# Patient Record
Sex: FEMALE | Race: WHITE | NOT HISPANIC OR LATINO | Employment: FULL TIME | URBAN - METROPOLITAN AREA
[De-identification: names, ages, dates, MRNs, and addresses within clinical notes are randomized per-mention and may not be internally consistent; named-entity substitution may affect disease eponyms.]

---

## 2017-01-10 ENCOUNTER — OFFICE VISIT (OUTPATIENT)
Dept: INTERNAL MEDICINE | Facility: CLINIC | Age: 57
End: 2017-01-10
Payer: COMMERCIAL

## 2017-01-10 VITALS
TEMPERATURE: 99 F | DIASTOLIC BLOOD PRESSURE: 76 MMHG | HEIGHT: 66 IN | WEIGHT: 151.69 LBS | SYSTOLIC BLOOD PRESSURE: 118 MMHG | BODY MASS INDEX: 24.38 KG/M2

## 2017-01-10 DIAGNOSIS — R05.9 COUGH: ICD-10-CM

## 2017-01-10 DIAGNOSIS — J02.9 SORETHROAT: Primary | ICD-10-CM

## 2017-01-10 DIAGNOSIS — R06.2 WHEEZE: ICD-10-CM

## 2017-01-10 DIAGNOSIS — R74.8 ABNORMAL LIVER ENZYMES: ICD-10-CM

## 2017-01-10 PROCEDURE — 99213 OFFICE O/P EST LOW 20 MIN: CPT | Mod: S$GLB,,, | Performed by: INTERNAL MEDICINE

## 2017-01-10 PROCEDURE — 87081 CULTURE SCREEN ONLY: CPT

## 2017-01-10 PROCEDURE — 99999 PR PBB SHADOW E&M-EST. PATIENT-LVL III: CPT | Mod: PBBFAC,,, | Performed by: INTERNAL MEDICINE

## 2017-01-10 RX ORDER — ALBUTEROL SULFATE 90 UG/1
2 AEROSOL, METERED RESPIRATORY (INHALATION) EVERY 6 HOURS PRN
Qty: 1 EACH | Refills: 11 | Status: SHIPPED | OUTPATIENT
Start: 2017-01-10 | End: 2017-01-26

## 2017-01-10 RX ORDER — ALBUTEROL SULFATE 90 UG/1
2 AEROSOL, METERED RESPIRATORY (INHALATION) EVERY 6 HOURS PRN
Qty: 1 EACH | Refills: 11 | Status: SHIPPED | OUTPATIENT
Start: 2017-01-10 | End: 2017-01-10 | Stop reason: SDUPTHER

## 2017-01-12 ENCOUNTER — TELEPHONE (OUTPATIENT)
Dept: INTERNAL MEDICINE | Facility: CLINIC | Age: 57
End: 2017-01-12

## 2017-01-12 NOTE — PROGRESS NOTES
Subjective:       Patient ID: Jacek Duran is a 56 y.o. female.    Chief Complaint: Headache and Sore Throat   she started with a sore throat yesterday  She has had congestion and fever  Her temperature in the office is 99.1  She doesn't know what her highest temperature was    She had an ultrasound that showed fatty liver but she had her gallbladder removed and a liver biopsy performed that showed less than 3% fatty liver and on inspection her liver appeared normal.  She does not have any symptoms.  HPI  Review of Systems   Constitutional: Negative.  Negative for activity change, appetite change, chills, fatigue, fever and unexpected weight change.   HENT: Positive for sore throat. Negative for hearing loss and tinnitus.    Eyes: Negative for visual disturbance.   Respiratory: Positive for cough and wheezing. Negative for shortness of breath.    Cardiovascular: Negative.  Negative for chest pain, palpitations and leg swelling.   Gastrointestinal: Negative for abdominal pain, blood in stool, constipation, diarrhea and nausea.   Genitourinary: Negative for dysuria, frequency and urgency.   Musculoskeletal: Negative for back pain and neck stiffness.   Skin: Negative for rash.   Neurological: Negative for headaches.   Psychiatric/Behavioral: Negative for dysphoric mood and sleep disturbance. The patient is not nervous/anxious.        Objective:      Physical Exam   Constitutional: She appears well-nourished.   HENT:   Head: Normocephalic and atraumatic.   Right Ear: External ear normal.   Left Ear: External ear normal.   Nose: Nose normal.   Mouth/Throat: Oropharynx is clear and moist. No oropharyngeal exudate.   Eyes: Conjunctivae are normal. No scleral icterus.   Neck: Neck supple.   Cardiovascular: Normal rate, regular rhythm and normal heart sounds.    Pulmonary/Chest: Effort normal and breath sounds normal. No respiratory distress. She has no wheezes. She has no rales. She exhibits no tenderness.   Abdominal:  Soft. There is no tenderness.   Musculoskeletal: She exhibits no edema.   Lymphadenopathy:     She has no cervical adenopathy.   Neurological: She is alert.   Skin: Skin is warm and dry.   Psychiatric: She has a normal mood and affect. Her behavior is normal.   Nursing note and vitals reviewed.      Assessment:       1. Sorethroat    2. Cough    3. Wheeze    4. Abnormal liver enzymes        Plan:   Jacek was seen today for headache and sore throat.    Diagnoses and all orders for this visit:    Sorethroat  -     Strep A culture, throat    Cough  -     Strep A culture, throat    Wheeze  -     Strep A culture, throat    Abnormal liver enzymes.  She will continue to follow this with her primary care physician.    Other orders  -     Discontinue: albuterol 90 mcg/actuation inhaler; Inhale 2 puffs into the lungs every 6 (six) hours as needed for Wheezing.  -     albuterol 90 mcg/actuation inhaler; Inhale 2 puffs into the lungs every 6 (six) hours as needed for Wheezing.

## 2017-01-12 NOTE — TELEPHONE ENCOUNTER
----- Message from Zain Ackerman sent at 1/12/2017  9:40 AM CST -----  Contact: self/ [phone] cell  Type: Rx    Name of medication(s):  [facility]    Is this a refill? New rx? New    Who prescribed medication?    Pharmacy Name, Phone, & Location: [facility] on [facility]    Comments: Pt states that she now has the green mucus and is still congested.  She would like something sent in today, if possible.  Please call and advise.    Thank you

## 2017-01-13 LAB — BACTERIA THROAT CULT: NORMAL

## 2017-01-26 ENCOUNTER — OFFICE VISIT (OUTPATIENT)
Dept: OPTOMETRY | Facility: CLINIC | Age: 57
End: 2017-01-26
Payer: COMMERCIAL

## 2017-01-26 DIAGNOSIS — H52.4 MYOPIA WITH PRESBYOPIA, BILATERAL: Primary | ICD-10-CM

## 2017-01-26 DIAGNOSIS — Z46.0 FITTING AND ADJUSTMENT OF SPECTACLES AND CONTACT LENSES: Primary | ICD-10-CM

## 2017-01-26 DIAGNOSIS — H52.13 MYOPIA WITH PRESBYOPIA, BILATERAL: Primary | ICD-10-CM

## 2017-01-26 PROCEDURE — 92002 INTRM OPH EXAM NEW PATIENT: CPT | Mod: S$GLB,,, | Performed by: OPTOMETRIST

## 2017-01-26 PROCEDURE — 92015 DETERMINE REFRACTIVE STATE: CPT | Mod: S$GLB,,, | Performed by: OPTOMETRIST

## 2017-01-26 PROCEDURE — 99999 PR PBB SHADOW E&M-EST. PATIENT-LVL II: CPT | Mod: PBBFAC,,, | Performed by: OPTOMETRIST

## 2017-01-26 PROCEDURE — 92310 CONTACT LENS FITTING OU: CPT | Mod: S$GLB,,, | Performed by: OPTOMETRIST

## 2017-01-26 NOTE — PROGRESS NOTES
HPI     RONALDO: 1 year ago  Pt states harder to read with left eye. Pt only wear CL in the right eye   for distance. Pt states denies f/f    No gtts        Last edited by Sara Resendiz on 1/26/2017  2:20 PM.     ROS     Positive for: Eyes    Negative for: Constitutional, Gastrointestinal, Neurological, Skin,   Genitourinary, Musculoskeletal, HENT, Endocrine, Cardiovascular,   Respiratory, Psychiatric, Allergic/Imm, Heme/Lymph    Last edited by Moises Pa, OD on 1/26/2017  2:24 PM. (History)        Assessment /Plan     For exam results, see Encounter Report.    Myopia with presbyopia, bilateral      1. EWSCL overwear OD w mild K edema.  Discussed in detail risks of EW!!!  2. Pt wearing one CL OD for dist, no CL OS for near.  Is -1.75  OD (-1.50 in CL), -1.00 OS.  Discussed w pt could switch and make OS wear CL for dist, and no CL OD for near--since OD more myopic would see better up close  3. Pt wearing AV2.  Tried N+D/OPTIX/OASYS in past without success.  Wishes to try ladan disp.  Good fit w AV ONE DAY MOIST  4. Pt refused DFE today    PLAN:    1. DISP trials ONE DAY AV MOIST--5 trials for each eye.  Pt will experiment whether she prefers wearing one CL OD or OS for dist  2. NO SLEEPING IN CLs!! Pt to exchange nightly  3. Pt will call for CLRx when determines which eye she prefers her CL in  4. rtc 1 yr

## 2017-01-26 NOTE — LETTER
January 27, 2017      Barbara Paz MD  [address] [facility] [address]           [place] - [facility]  [address] [facility] [address]  [place] [address]  Phone: [phone]  Fax: [phone]          Patient: Jacek Duran   MR Number: [id number]   YOB: 1960   Date of Visit: 1/26/2017       Dear Dr. Barbara Paz:    Thank you for referring Jacek Duran to me for evaluation. Attached you will find relevant portions of my assessment and plan of care.    If you have questions, please do not hesitate to call me. I look forward to following Jacek Duran along with you.    Sincerely,    Moises Pa, OD    Enclosure  CC:  No Recipients    If you would like to receive this communication electronically, please contact [email or web address] or [phone] to request more information on [facility] access.    For providers and/or their staff who would like to refer a patient to [facility], please contact us through our one-stop-shop provider referral line, [facility] , at [phone].    If you feel you have received this communication in error or would no longer like to receive these types of communications, please e-mail [email or web address]

## 2017-02-24 ENCOUNTER — TELEPHONE (OUTPATIENT)
Dept: OTOLARYNGOLOGY | Facility: CLINIC | Age: 57
End: 2017-02-24

## 2017-02-24 NOTE — TELEPHONE ENCOUNTER
Returned call. No answer. Left message along with contact information will have Roxanne CORMIER return call next week.

## 2017-02-27 ENCOUNTER — PATIENT MESSAGE (OUTPATIENT)
Dept: OTOLARYNGOLOGY | Facility: CLINIC | Age: 57
End: 2017-02-27

## 2017-03-13 ENCOUNTER — PATIENT MESSAGE (OUTPATIENT)
Dept: INTERNAL MEDICINE | Facility: CLINIC | Age: 57
End: 2017-03-13

## 2017-03-13 DIAGNOSIS — Z00.00 ROUTINE GENERAL MEDICAL EXAMINATION AT A HEALTH CARE FACILITY: Primary | ICD-10-CM

## 2017-03-14 ENCOUNTER — LAB VISIT (OUTPATIENT)
Dept: LAB | Facility: HOSPITAL | Age: 57
End: 2017-03-14
Attending: INTERNAL MEDICINE
Payer: COMMERCIAL

## 2017-03-14 DIAGNOSIS — Z00.00 ROUTINE GENERAL MEDICAL EXAMINATION AT A HEALTH CARE FACILITY: ICD-10-CM

## 2017-03-14 LAB
ALBUMIN SERPL BCP-MCNC: 3.9 G/DL
ALP SERPL-CCNC: 99 U/L
ALT SERPL W/O P-5'-P-CCNC: 29 U/L
ANION GAP SERPL CALC-SCNC: 11 MMOL/L
AST SERPL-CCNC: 29 U/L
BASOPHILS # BLD AUTO: 0.01 K/UL
BASOPHILS NFR BLD: 0.2 %
BILIRUB SERPL-MCNC: 0.4 MG/DL
BUN SERPL-MCNC: 14 MG/DL
CALCIUM SERPL-MCNC: 9.2 MG/DL
CHLORIDE SERPL-SCNC: 104 MMOL/L
CO2 SERPL-SCNC: 24 MMOL/L
CREAT SERPL-MCNC: 0.8 MG/DL
DIFFERENTIAL METHOD: NORMAL
EOSINOPHIL # BLD AUTO: 0.2 K/UL
EOSINOPHIL NFR BLD: 2.6 %
ERYTHROCYTE [DISTWIDTH] IN BLOOD BY AUTOMATED COUNT: 12.2 %
EST. GFR  (AFRICAN AMERICAN): >60 ML/MIN/1.73 M^2
EST. GFR  (NON AFRICAN AMERICAN): >60 ML/MIN/1.73 M^2
ESTIMATED AVG GLUCOSE: 105 MG/DL
GLUCOSE SERPL-MCNC: 116 MG/DL
HBA1C MFR BLD HPLC: 5.3 %
HCT VFR BLD AUTO: 37.2 %
HGB BLD-MCNC: 12.1 G/DL
LYMPHOCYTES # BLD AUTO: 2.3 K/UL
LYMPHOCYTES NFR BLD: 36.3 %
MCH RBC QN AUTO: 28.7 PG
MCHC RBC AUTO-ENTMCNC: 32.5 %
MCV RBC AUTO: 88 FL
MONOCYTES # BLD AUTO: 0.4 K/UL
MONOCYTES NFR BLD: 6.8 %
NEUTROPHILS # BLD AUTO: 3.3 K/UL
NEUTROPHILS NFR BLD: 53.9 %
PLATELET # BLD AUTO: 246 K/UL
PMV BLD AUTO: 10.7 FL
POTASSIUM SERPL-SCNC: 3.6 MMOL/L
PROT SERPL-MCNC: 7.7 G/DL
RBC # BLD AUTO: 4.21 M/UL
SODIUM SERPL-SCNC: 139 MMOL/L
TSH SERPL DL<=0.005 MIU/L-ACNC: 1.15 UIU/ML
WBC # BLD AUTO: 6.19 K/UL

## 2017-03-14 PROCEDURE — 36415 COLL VENOUS BLD VENIPUNCTURE: CPT

## 2017-03-14 PROCEDURE — 84443 ASSAY THYROID STIM HORMONE: CPT

## 2017-03-14 PROCEDURE — 85025 COMPLETE CBC W/AUTO DIFF WBC: CPT

## 2017-03-14 PROCEDURE — 80053 COMPREHEN METABOLIC PANEL: CPT

## 2017-03-14 PROCEDURE — 83036 HEMOGLOBIN GLYCOSYLATED A1C: CPT

## 2017-03-16 ENCOUNTER — OFFICE VISIT (OUTPATIENT)
Dept: INTERNAL MEDICINE | Facility: CLINIC | Age: 57
End: 2017-03-16
Payer: COMMERCIAL

## 2017-03-16 VITALS
HEART RATE: 73 BPM | SYSTOLIC BLOOD PRESSURE: 104 MMHG | HEIGHT: 66 IN | TEMPERATURE: 98 F | DIASTOLIC BLOOD PRESSURE: 76 MMHG | WEIGHT: 150.38 LBS | BODY MASS INDEX: 24.17 KG/M2 | RESPIRATION RATE: 16 BRPM

## 2017-03-16 DIAGNOSIS — Z00.00 ANNUAL PHYSICAL EXAM: Primary | ICD-10-CM

## 2017-03-16 DIAGNOSIS — E55.9 VITAMIN D DEFICIENCY: ICD-10-CM

## 2017-03-16 DIAGNOSIS — Z12.31 VISIT FOR SCREENING MAMMOGRAM: ICD-10-CM

## 2017-03-16 PROCEDURE — 99999 PR PBB SHADOW E&M-EST. PATIENT-LVL III: CPT | Mod: PBBFAC,,, | Performed by: INTERNAL MEDICINE

## 2017-03-16 PROCEDURE — 99396 PREV VISIT EST AGE 40-64: CPT | Mod: S$GLB,,, | Performed by: INTERNAL MEDICINE

## 2017-03-16 NOTE — PROGRESS NOTES
Subjective:       Patient ID: Jacek Duran is a 56 y.o. female.    Chief Complaint: Annual Exam    HPI     56 y.o. female here for annual exam.     Cholesterol: needs  Vaccines: Influenza - UTD; Tetanus - 2008  Sexual Screening: not active  STD screening: not active  Eye exam: done last recently; December  Mammogram: due  Gyn exam: done last year.  Colonoscopy: due   A1c: 5.3    Exercise:  Goes to the gym and uses the treadmill.  Chases 2.4 yo.  Runs around work all day long.  Diet: ok diet.  No fast food, but eats out a lot and cooks at home.  Drinks water, coffee.  No juice, soft drinks, rarely tea.  Some milk.    Past Medical History:   Diagnosis Date    Abnormal Pap smear     before , none since    Fatty liver     Hyperlipidemia     Mild vitamin D deficiency      Past Surgical History:   Procedure Laterality Date    breast augmentation       SECTION, LOW TRANSVERSE      CHOLECYSTECTOMY       Social History     Social History    Marital status: Single     Spouse name: N/A    Number of children: 2    Years of education: N/A     Occupational History    [employer] [employer]     Social History Main Topics    Smoking status: Never Smoker    Smokeless tobacco: Never Used    Alcohol use Yes      Comment: twice a month    Drug use: No    Sexual activity: Not Currently     Other Topics Concern    Not on file     Social History Narrative    She is exercising on and off.     Review of patient's allergies indicates:  No Known Allergies  Ms. Duran had no medications administered during this visit.    Review of Systems   Constitutional: Negative for chills, fever and unexpected weight change.   HENT: Negative for congestion, postnasal drip and sore throat.    Eyes: Negative for redness and visual disturbance.   Respiratory: Negative for cough and shortness of breath.    Cardiovascular: Negative for chest pain and palpitations.   Gastrointestinal: Negative for abdominal pain,  constipation, diarrhea, nausea and vomiting.   Genitourinary: Negative for dysuria, frequency and hematuria.   Musculoskeletal: Negative for arthralgias and myalgias.   Skin: Negative for color change and rash.   Neurological: Negative for dizziness and headaches.       Objective:      Physical Exam   Constitutional: She is oriented to person, place, and time. She appears well-developed and well-nourished.   HENT:   Head: Normocephalic and atraumatic.   Mouth/Throat: No oropharyngeal exudate.   Eyes: EOM are normal. Pupils are equal, round, and reactive to light. Right eye exhibits no discharge. Left eye exhibits no discharge. No scleral icterus.   Neck: Normal range of motion. Neck supple. No tracheal deviation present. No thyromegaly present.   Cardiovascular: Normal rate, regular rhythm and normal heart sounds.  Exam reveals no gallop and no friction rub.    No murmur heard.  Pulmonary/Chest: Effort normal and breath sounds normal. No respiratory distress. She has no wheezes. She has no rales. She exhibits no tenderness.   Abdominal: Soft. Bowel sounds are normal. She exhibits no distension and no mass. There is no tenderness. There is no rebound and no guarding.   Musculoskeletal: Normal range of motion. She exhibits no edema or tenderness.   Neurological: She is alert and oriented to person, place, and time.   Skin: Skin is warm and dry. No rash noted. No erythema. No pallor.   Psychiatric: She has a normal mood and affect. Her behavior is normal.   Vitals reviewed.      Assessment:       1. Annual physical exam    2. Vitamin D deficiency    3. Visit for screening mammogram        Plan:       1.  Reviewed blood work with patient.  Still need to get lipid panel.  Check vitamin D as well.  Discussed exercise with patient.  Up-to-date colonoscopy and GYN screening.  2.  Vitamin D.  3.  Mammogram.

## 2017-05-05 ENCOUNTER — TELEPHONE (OUTPATIENT)
Dept: INTERNAL MEDICINE | Facility: CLINIC | Age: 57
End: 2017-05-05

## 2017-06-01 ENCOUNTER — TELEPHONE (OUTPATIENT)
Dept: INTERNAL MEDICINE | Facility: CLINIC | Age: 57
End: 2017-06-01

## 2017-06-01 ENCOUNTER — PATIENT MESSAGE (OUTPATIENT)
Dept: INTERNAL MEDICINE | Facility: CLINIC | Age: 57
End: 2017-06-01

## 2017-06-01 ENCOUNTER — HOSPITAL ENCOUNTER (OUTPATIENT)
Dept: RADIOLOGY | Facility: HOSPITAL | Age: 57
Discharge: HOME | End: 2017-06-01
Attending: OBSTETRICS & GYNECOLOGY
Payer: COMMERCIAL

## 2017-06-01 DIAGNOSIS — Z12.31 VISIT FOR SCREENING MAMMOGRAM: ICD-10-CM

## 2017-06-01 PROCEDURE — 77067 SCR MAMMO BI INCL CAD: CPT | Mod: 26,,, | Performed by: RADIOLOGY

## 2017-06-01 PROCEDURE — 77067 SCR MAMMO BI INCL CAD: CPT | Mod: TC

## 2017-06-01 PROCEDURE — 77063 BREAST TOMOSYNTHESIS BI: CPT | Mod: 26,,, | Performed by: RADIOLOGY

## 2017-06-02 ENCOUNTER — LAB VISIT (OUTPATIENT)
Dept: LAB | Facility: HOSPITAL | Age: 57
End: 2017-06-02
Attending: INTERNAL MEDICINE
Payer: COMMERCIAL

## 2017-06-02 ENCOUNTER — TELEPHONE (OUTPATIENT)
Dept: INTERNAL MEDICINE | Facility: CLINIC | Age: 57
End: 2017-06-02

## 2017-06-02 ENCOUNTER — PATIENT MESSAGE (OUTPATIENT)
Dept: OBSTETRICS AND GYNECOLOGY | Facility: CLINIC | Age: 57
End: 2017-06-02

## 2017-06-02 DIAGNOSIS — E55.9 VITAMIN D DEFICIENCY: ICD-10-CM

## 2017-06-02 DIAGNOSIS — Z00.00 ROUTINE GENERAL MEDICAL EXAMINATION AT A HEALTH CARE FACILITY: ICD-10-CM

## 2017-06-02 DIAGNOSIS — Z11.59 NEED FOR HEPATITIS C SCREENING TEST: ICD-10-CM

## 2017-06-02 LAB
25(OH)D3+25(OH)D2 SERPL-MCNC: 22 NG/ML
CHOLEST/HDLC SERPL: 3 {RATIO}
HCV AB SERPL QL IA: NEGATIVE
HDL/CHOLESTEROL RATIO: 33.6 %
HDLC SERPL-MCNC: 226 MG/DL
HDLC SERPL-MCNC: 76 MG/DL
LDLC SERPL CALC-MCNC: 137.4 MG/DL
NONHDLC SERPL-MCNC: 150 MG/DL
TRIGL SERPL-MCNC: 63 MG/DL

## 2017-06-02 PROCEDURE — 86803 HEPATITIS C AB TEST: CPT

## 2017-06-02 PROCEDURE — 80061 LIPID PANEL: CPT

## 2017-06-02 PROCEDURE — 36415 COLL VENOUS BLD VENIPUNCTURE: CPT

## 2017-06-02 PROCEDURE — 82306 VITAMIN D 25 HYDROXY: CPT

## 2017-06-02 RX ORDER — ERGOCALCIFEROL 1.25 MG/1
50000 CAPSULE ORAL WEEKLY
Qty: 12 CAPSULE | Refills: 0 | Status: SHIPPED | OUTPATIENT
Start: 2017-06-02 | End: 2017-08-19

## 2017-08-21 ENCOUNTER — PATIENT MESSAGE (OUTPATIENT)
Dept: INTERNAL MEDICINE | Facility: CLINIC | Age: 57
End: 2017-08-21

## 2017-08-22 ENCOUNTER — TELEPHONE (OUTPATIENT)
Dept: INTERNAL MEDICINE | Facility: CLINIC | Age: 57
End: 2017-08-22

## 2017-08-22 NOTE — TELEPHONE ENCOUNTER
"----- Message from Onel Aleman sent at 8/22/2017  7:20 AM CDT -----  Contact: patient at [phone]  Patient scheduled [facility] appointment on 8/24 for "Heart flutter at times of rest".   Is this okay or does patient need to speak with you first regarding these symptoms?  "

## 2017-08-24 ENCOUNTER — CLINICAL SUPPORT (OUTPATIENT)
Dept: CARDIOLOGY | Facility: CLINIC | Age: 57
End: 2017-08-24
Payer: COMMERCIAL

## 2017-08-24 ENCOUNTER — OFFICE VISIT (OUTPATIENT)
Dept: INTERNAL MEDICINE | Facility: CLINIC | Age: 57
End: 2017-08-24
Payer: COMMERCIAL

## 2017-08-24 VITALS
HEART RATE: 72 BPM | BODY MASS INDEX: 24.2 KG/M2 | WEIGHT: 150.56 LBS | SYSTOLIC BLOOD PRESSURE: 102 MMHG | TEMPERATURE: 98 F | DIASTOLIC BLOOD PRESSURE: 68 MMHG | HEIGHT: 66 IN

## 2017-08-24 DIAGNOSIS — R00.2 PALPITATIONS: ICD-10-CM

## 2017-08-24 DIAGNOSIS — R00.2 PALPITATIONS: Primary | ICD-10-CM

## 2017-08-24 PROCEDURE — 99213 OFFICE O/P EST LOW 20 MIN: CPT | Mod: S$GLB,,, | Performed by: INTERNAL MEDICINE

## 2017-08-24 PROCEDURE — 93224 XTRNL ECG REC UP TO 48 HRS: CPT | Mod: S$GLB,,, | Performed by: INTERNAL MEDICINE

## 2017-08-24 PROCEDURE — 93010 ELECTROCARDIOGRAM REPORT: CPT | Mod: S$GLB,,, | Performed by: INTERNAL MEDICINE

## 2017-08-24 PROCEDURE — 3008F BODY MASS INDEX DOCD: CPT | Mod: S$GLB,,, | Performed by: INTERNAL MEDICINE

## 2017-08-24 PROCEDURE — 99999 PR PBB SHADOW E&M-EST. PATIENT-LVL III: CPT | Mod: PBBFAC,,, | Performed by: INTERNAL MEDICINE

## 2017-08-24 PROCEDURE — 93005 ELECTROCARDIOGRAM TRACING: CPT | Mod: S$GLB,,, | Performed by: INTERNAL MEDICINE

## 2017-08-24 NOTE — PROGRESS NOTES
Verified pt name and .  Pt signed consent stated that the monitor will be returned to 8th floor Cardiology at the [facility].  Pt prepped and EKG leads placed.  Pt to be on holter monitor for 48 hours.  Instructions, diary and extra EKG pads provided.

## 2017-08-24 NOTE — PROGRESS NOTES
Subjective:       Patient ID: Jacek Duran is a 57 y.o. female.    Chief Complaint: Palpitations    HPI     57-year-old female here for evaluation of palpitations.  She can lie sideways and feels her heart fluttering.  This lasts for 30 seconds maybe.  For a while, it happened multiple times a month.  Now, she has started dieting has has not noticed this yet.  She has anxiety/caffiene with racing heart, which is different.  These are different than the ones she had two years ago.    Review of Systems   Constitutional: Negative for activity change and unexpected weight change.   HENT: Negative for hearing loss, rhinorrhea and trouble swallowing.    Eyes: Negative for discharge and visual disturbance.   Respiratory: Negative for chest tightness and wheezing.    Cardiovascular: Positive for palpitations. Negative for chest pain.   Gastrointestinal: Negative for blood in stool, constipation, diarrhea and vomiting.   Endocrine: Negative for polydipsia and polyuria.   Genitourinary: Negative for difficulty urinating, dysuria, hematuria and menstrual problem.   Musculoskeletal: Negative for arthralgias, joint swelling and neck pain.   Neurological: Negative for weakness and headaches.   Psychiatric/Behavioral: Negative for confusion and dysphoric mood.       Objective:      Physical Exam   Constitutional: She is oriented to person, place, and time. She appears well-developed and well-nourished.   HENT:   Head: Normocephalic and atraumatic.   Mouth/Throat: No oropharyngeal exudate.   Eyes: EOM are normal. Pupils are equal, round, and reactive to light. Right eye exhibits no discharge. Left eye exhibits no discharge. No scleral icterus.   Neck: Normal range of motion. Neck supple. No tracheal deviation present. No thyromegaly present.   Cardiovascular: Normal rate, regular rhythm and normal heart sounds.  Exam reveals no gallop and no friction rub.    No murmur heard.  Pulmonary/Chest: Effort normal and breath sounds  normal. No respiratory distress. She has no wheezes. She has no rales. She exhibits no tenderness.   Abdominal: Soft. Bowel sounds are normal. She exhibits no distension and no mass. There is no tenderness. There is no rebound and no guarding.   Musculoskeletal: Normal range of motion. She exhibits no edema or tenderness.   Neurological: She is alert and oriented to person, place, and time.   Skin: Skin is warm and dry. No rash noted. No erythema. No pallor.   Psychiatric: She has a normal mood and affect. Her behavior is normal.   Vitals reviewed.      Assessment:       1. Palpitations        Plan:       1.  Check EKG and 48 hour Holter monitor.  May need to have 30 day event monitor.

## 2017-09-26 ENCOUNTER — CLINICAL SUPPORT (OUTPATIENT)
Dept: REHABILITATION | Facility: OTHER | Age: 57
End: 2017-09-26
Attending: PHYSICAL MEDICINE & REHABILITATION
Payer: COMMERCIAL

## 2017-09-26 VITALS
HEART RATE: 66 BPM | HEIGHT: 66 IN | BODY MASS INDEX: 23.3 KG/M2 | WEIGHT: 145 LBS | DIASTOLIC BLOOD PRESSURE: 60 MMHG | SYSTOLIC BLOOD PRESSURE: 108 MMHG

## 2017-09-26 DIAGNOSIS — M54.2 NECK PAIN: ICD-10-CM

## 2017-09-26 DIAGNOSIS — M50.30 DDD (DEGENERATIVE DISC DISEASE), CERVICAL: Primary | ICD-10-CM

## 2017-09-26 PROCEDURE — 97750 PHYSICAL PERFORMANCE TEST: CPT | Mod: 32 | Performed by: PHYSICAL MEDICINE & REHABILITATION

## 2017-09-26 NOTE — PROGRESS NOTES
Health  met with patient to complete initial outcomes for the Healthy Back cervical program.  Questions were reviewed with patient and discussed with Dr. Kirby. The patient will meet with Dr. Kirby to determine program enrollment.   HealthyBack Questionnaire  9/26/2017   Please select the location of your worst pain:  Neck   When did this pain begin?  about 9 months ago   Do you need any help looking after yourself? I need no help at all.   When doing household tasks, e.g., preparing food, gardening, using the video recorder, radio, telephone, or washing the car: Occasionally I need some help with household tasks.   Thinking about how easily you can get around your home and community: I get around my home and community by myself without any difficulty.   Because of your health, your relationships, e.g., your friends, partner or parents, generally: Are very close and warm   Thinking about your relationships with other people: I have plenty of friends and am never lonely.   Thinking about your health and your relationship with your  family:  There are some parts of my family role I cannot carry out.   Thinking about your vision, including when using your glasses or contact lenses if needed: I have some difficulty focusing on things, or I do not see them sharply, e.g., small print, a newspaper or objects in the distance.   Thinking about your hearing, including using your hearing aid if needed: I hear normally.   When you communicate with others, e.g., talking, listening, writing, or signing: I have no trouble speaking to them or understanding what they are saying.   Thinking about how you sleep: My sleep is interrupted some of the time, but I am usually able to go back to sleep without difficulty.   Thinking about how you generally feel: I am slightly anxious, worried or depressed.   How much pain or discomfort do you experience? I have moderate pain.   Little interest or pleasure in doing things Not at all    Feeling down, depressed or hopeless Not at all   Please select the location of any additional pain: (hold down the control key, and click to select multiple responses) Arm - Left   Did any event trigger your pain?  No   How long has this pain been going on?  about 9 months   Please indicate how the pain is changing.  Improving   What is the WORST level of pain that you have experienced in the last week?  7   What is the LEAST level of pain that you have experienced in the past week?  0   What can you NOT do not that you used to be able to do?  NA   Are your limitations mainly due to your pain?  No   What are your additional complaints, if any? (hold down the control key, and click to select multiple responses) Numbness   Is there ever a time during the day when your pain stops, even for a brief moment, before returning? Yes   If yes, when your pain stops, does it disappear completely? Is it totally gone? Yes   Does looking down make your typical pain worse? No   Morning: Worse during   Afternoon: Better during   Evening:  Worse during   Nighttime: Worse during   Standing:  Same   Lying on stomach: Worse   Lying on back: Same   Sitting:  Same   Walking: Same   Using a pillow: Better   Have all of your attempts to treat your neck/arm pain resulted in failure?  No   Do you believe your doctor(s) do NOT understand how much pain you have?  No   Do you believe that you have one or more conditions that have not been diagnosed by your doctor(s)?  No   Do you believe that you deserve more understanding from others including your family than you are getting?  No   Do you feel relatively calm about how your neck/arm pain has impacted your life?  Yes   Are you OK with treatment taking a very long time (even years) before you feel relief from your neck/arm pain?  Yes   Do you believe that your pain has caused you to be more forgetful?  No   Do you feel that you have not received enough treatment for your pain?  No   Do you  believe that your doctor(s) do not have the right training to treat your neck/arm pain effectively?  No   Do you believe you should not be allowed to work or attend school because of your neck/arm pain?  No   When did this pain begin?  about 9 months ago   Did the pain begin after an injury or an accident? No   Is the pain work related?  Yes   Please joie which of the following over-the-counter medicines you take. (hold down the control key, and click to select multiple responses) Ibuprofen   Please joie which of the following prescription medicines you take. (hold down the control key, and click to select multiple responses) Other   Emergency department  No   Health care providers (hold down the control key, and click to select multiple responses) Orthopedic   Investigations done (hold down the control key, and click to select multiple responses) X-ray   Procedures (hold down the control key, and click to select multiple responses) None   Emergency department  No   Health care providers (hold down the control key, and click to select multiple responses) None   Investigations done (hold down the control key, and click to select multiple responses) None   Procedures (hold down the control key, and click to select multiple responses) None   Have you had any surgery on your neck? No   Please joie what you are experiencing. (hold down the control key, and click to select multiple responses) None   First activity you would like to perform better:  be able to strengthening  neck    Current ability score to perform first activity: 4   Second activity you would like to perform better: being active at work    Current ability score to perform second activity: 5   Third activity you would like to perform better: laying on stomach    Current ability score to perform third activity: 2   Pain intensity:  I have no pain at the moment.   Personal care (washing, dressing, etc.): I can look after myself without causing extra pain.    Lifting: Pain prevents me from lifting heavy weights off the floor, but I can manage if conveniently positioned, e.g., a table.   Reading: I can read as much as I want with slight pain in my neck.   Headaches: I have no headaches at all.   Concentration: I can concentrate fully when I want to with slight difficulty.   Working: I can only do my usual work but no more.   Driving: I can drive my car as long as I want with slight pain in my neck.   Sleeping: My sleep is moderately disturbed (2-3 hours sleepless).   Recreation: I am able to engage in all of my recreation activities, with some pain in my neck.   Do you need any help looking after yourself? I need no help at all.   When doing household tasks, e.g., preparing food, gardening, using the video recorder, radio, telephone, or washing the car: Occasionally I need some help with household tasks.   Thinking about how easily you can get around your home and community: I get around my home and community by myself without any difficulty.   Because of your health, your relationships, e.g., your friends, partner or parents, generally: Are very close and warm   Thinking about your relationships with other people: I have plenty of friends and am never lonely.   Thinking about your health and your relationship with your  family:  There are some parts of my family role I cannot carry out.   Thinking about your vision, including when using your glasses or contact lenses if needed: I have some difficulty focusing on things, or I do not see them sharply, e.g., small print, a newspaper or objects in the distance.   Thinking about your hearing, including using your hearing aid if needed: I hear normally.   When you communicate with others, e.g., talking, listening, writing, or signing: I have no trouble speaking to them or understanding what they are saying.   Thinking about how you sleep: My sleep is interrupted some of the time, but I am usually able to go back to sleep  without difficulty.   Thinking about how you generally feel: I am slightly anxious, worried or depressed.   How much pain or discomfort do you experience? I have moderate pain.   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 What are your hobbies? shopping    What is your highest level of education? College   What is your work status? Employed   How did you hear about the Healthyback program?  Patient referral

## 2017-09-26 NOTE — PROGRESS NOTES
Subjective:      Patient ID: Jacek Duran is a 57 y.o. female.    Chief Complaint: No chief complaint on file.    Referred by: No ref. provider found     Ms Duran is a 58 yo female here  for evaluation for the healthy back cervical program.  she has had neck pain for for over a year, 2016.  The pain reslved.  She will have some achy pain, but mainly want to be preventative.  The pain is neck dominant, she had one episode of left arm pain.  The pain is intermittent, ranging between 0-3/10.  The pain is an achy pain with occasional shooting.   It is worse with lying on her stomach and lying on her back, looking down for too long, and looking to the side.  She is stiff in the morning.  She feels better with a pillow.  She has never been to PT, or chiropractor.  She had one massage without relief.  She exercises without complaint of neck pain.  Her goals are to be able to push gurneys, lie on her stomach, and strengthen her neck.      MR! cervical 2016  Findings: No marrow replacement marrow edema infection or neoplasm seen.  There is minimal DJD.  The cord is normal in course, caliber, and signal and the craniocervical junction is normal..  No soft tissue injury or whiplash injury seen.    C2 -- C3 is unremarkable.    C3 -- C4 is unremarkable.    C4 -- C5 is unremarkable.    C5 -- C6 demonstrates a broad-based disk protrusion that lateralizes towards the left flattens the left anterior lateral thecal sac.  Neural foramina are patent.    C6 -- C7 and C7 -- T1 are unremarkable.  Impression      Disk protrusion C5 -- C6.      Past Medical History:  No date: Abnormal Pap smear      Comment: before , none since  No date: Fatty liver  No date: Hyperlipidemia  No date: Mild vitamin D deficiency    Past Surgical History:  No date: breast augmentation  No date:  SECTION, LOW TRANSVERSE  No date: CHOLECYSTECTOMY    Review of patient's family history indicates:    Kidney disease                 Mother                       Comment: brought on by medication    Diabetes                       Mother                    Cataracts                      Mother                    Hypertension                   Mother                    Hyperlipidemia                 Mother                    Heart disease                  Father                      Comment: CABG x 3 in 70s; pacemaker placed at 96    Cataracts                      Father                    Colon cancer                   Paternal Uncle              Comment: in 70s-80s    Heart disease                  Paternal Uncle              Comment: CABG in 60s-70s - all uncles.    Stroke                         Maternal Grandmother      Amblyopia                      Neg Hx                    Blindness                      Neg Hx                    Glaucoma                       Neg Hx                    Macular degeneration           Neg Hx                    Retinal detachment             Neg Hx                    Strabismus                     Neg Hx                    Thyroid disease                Neg Hx                    Breast cancer                  Neg Hx                    Ovarian cancer                 Neg Hx                    Cancer                         Neg Hx                    Melanoma                       Neg Hx                      Social History    Marital status: Single              Spouse name:                       Years of education:                 Number of children: 2             Occupational History  Occupation          Employer            Comment               [employer]     Social History Main Topics    Smoking status: Never Smoker                                                                Smokeless tobacco: Never Used                        Alcohol use: Yes                Comment: twice a month    Drug use: No              Sexual activity: Not Currently        Social History Narrative    She is exercising on  and off.        Current Outpatient Prescriptions:  acyclovir (ZOVIRAX) 400 MG tablet, Take 1 tablet (400 mg total) by mouth 3 (three) times daily. (Patient taking differently: Take 400 mg by mouth 3 (three) times daily as needed. ), Disp: 21 tablet, Rfl: 0    No current facility-administered medications for this visit.       Review of patient's allergies indicates:  No Known Allergies                Review of Systems   Constitution: Negative for weight gain and weight loss.   Cardiovascular: Negative for chest pain.   Respiratory: Negative for shortness of breath.    Musculoskeletal: Positive for back pain. Negative for joint pain and joint swelling.   Gastrointestinal: Negative for abdominal pain and bowel incontinence.   Genitourinary: Negative for bladder incontinence.   Neurological: Positive for paresthesias (come and go in pinky). Negative for numbness.           Objective:          General    Vitals reviewed.  Constitutional: She is oriented to person, place, and time. She appears well-developed and well-nourished.   HENT:   Head: Normocephalic and atraumatic.   Pulmonary/Chest: Effort normal.   Neurological: She is alert and oriented to person, place, and time.   Psychiatric: She has a normal mood and affect. Her behavior is normal. Judgment and thought content normal.     General Musculoskeletal Exam   Gait: normal     Right Ankle/Foot Exam     Tests   Heel Walk: able to perform  Tiptoe Walk: able to perform    Left Ankle/Foot Exam     Tests   Heel Walk: able to perform  Tiptoe Walk: able to perform  Back (L-Spine & T-Spine) / Neck (C-Spine) Exam     Neck (C-Spine) Range of Motion   Flexion:     > 40  Extension: 30Right Lateral Bend: 20 Left Lateral Bend: 20 Right Rotation: 40 Left Rotation: 40     Spinal Sensation   Right Side Sensation  C-Spine Level: normal   L-Spine Level: normal  S-Spine Level: normal  Left Side Sensation  C-Spine Level: normal  L-Spine Level: normal  S-Spine Level: normal    Back  (L-Spine & T-Spine) Tests   Right Side Tests  Straight leg raise:      Sitting SLR: > 70 degrees      Left Side Tests  Straight leg raise:     Sitting SLR: > 70 degrees          Other She has no scoliosis .  Spinal Kyphosis:  Absent      Muscle Strength   Right Upper Extremity   Biceps: 5/5/5   Deltoid:  5/5  Triceps:  5/5  Wrist Extension: 5/5/5   Finger Flexors:  5/5  Left Upper Extremity  Biceps: 5/5/5   Deltoid:  5/5  Triceps:  5/5  Wrist Extension: 5/5/5   Finger Flexors:  5/5  Right Lower Extremity   Hip Flexion: 5/5   Quadriceps:  5/5   Anterior tibial:  5/5/5  EHL:  5/5  Left Lower Extremity   Hip Flexion: 5/5   Quadriceps:  5/5   Anterior tibial:  5/5/5   EHL:  5/5    Reflexes     Left Side  Biceps:  1+  Triceps:  1+  Brachioradialis:  1+  Quadriceps:  1+  Achilles:  1+  Left Bennett's Sign:  Absent  Babinski Sign:  absent    Right Side   Biceps:  1+  Triceps:  1+  Brachioradialis:  1+  Quadriceps:  1+  Achilles:  1+  Right Bennett's Sign:  absent  Babinski Sign:  absent    Vascular Exam     Right Pulses        Carotid:                  2+    Left Pulses        Carotid:                  2+        Assessment:       Encounter Diagnoses   Name Primary?    DDD (degenerative disc disease), cervical Yes    Neck pain          Plan:       Diagnoses and all orders for this visit:    DDD (degenerative disc disease), cervical  -     Ambulatory Referral to Physical/Occupational Therapy    Neck pain  -     Ambulatory Referral to Physical/Occupational Therapy         The patient has had a history of neck pain with limitations in there activities of Daily living    Previous treatment has not provided relief.    The situation was discussed at length with the patient.  We discussed different causes of neck pain and different treatment options.  We discussed the importance of stretching and strengthening.  We discussed posture.  We discussed the pros and cons of further diagnostic testing, alternative treatment and  Medications    Based on the history, physical exam, and functional index, an active physical therapy program is recommended.  The goal is to restore the patients function and reduce pain.  A program of progressive resistance exercises, biomechanical, and mobility maneuvers, instructions in proper body mechanics, aerobic conditioning and HEP will be utilized. The program will continue as long as making improvements.    An assessment of patients progress will be made at each visit to document change in status.    The patient will be actively involved in there own treatment, and responsible for appointments and home program    The patient's cervical isometric strength will be tested and they will be placed in a program of isolated strength training based on 50% of their total functional torque and advanced as clinically appropriate.      Directional preference of pain will further influence the patients active rehabilitation program    The patient was instructed there might be an initial increase in discomfort    They are enrolled in cervical program with good prognosis  Pattern 1

## 2017-10-24 ENCOUNTER — CLINICAL SUPPORT (OUTPATIENT)
Dept: REHABILITATION | Facility: OTHER | Age: 57
End: 2017-10-24
Attending: PHYSICAL MEDICINE & REHABILITATION
Payer: COMMERCIAL

## 2017-10-24 DIAGNOSIS — M54.2 NECK PAIN: ICD-10-CM

## 2017-10-24 NOTE — PLAN OF CARE
[facility] - PHYSICAL THERAPY EVALUATION     Name: Jacek PALACIOS [facility] Number: [id number]    Jacek is a 57 y.o. female evaluated on 10/24/2017.   Time in: 1:10  Time out:2:30    Diagnosis:   Encounter Diagnosis   Name Primary?    Neck pain      Physician: Janis Kirby, *  Treatment Orders: PT Eval and Treat    Past Medical History:   Diagnosis Date    Abnormal Pap smear     before 2005, none since    Fatty liver     Hyperlipidemia     Mild vitamin D deficiency      Current Outpatient Prescriptions   Medication Sig    acyclovir (ZOVIRAX) 400 MG tablet Take 1 tablet (400 mg total) by mouth 3 (three) times daily. (Patient taking differently: Take 400 mg by mouth 3 (three) times daily as needed. )     No current facility-administered medications for this visit.      Review of patient's allergies indicates:  No Known Allergies  Precautions: History of bilateral adhesive capsulitis,     Pattern of pain determined: 1 LISA  Visit # authorized: 30  Authorization period: 12/31/17  Plan of care Expiration: Sent 10/24/2017          HISTORY   History of Present Illness:  She has had neck pain for for over a year, jan 2016.  The pain resolved.  She will have some achy pain, but mainly want to be preventative.  The pain is neck dominant, she had one episode of left arm pain.  The pain is intermittent.   The pain is an achy pain with occasional shooting.  Pt reports some occasional numbness and tingling of bilateral hands.     Diagnostic Tests: From EPIC Radiographs  External Result Report   Narrative     Findings: There is mild DJD.  No fracture-dislocation bone destruction or instability seen.  Odontoid is intact.         Disk protrusion C5 -- C6.- Per MRI     Pain Scale: Jacek rates pain on a scale of 0-10 to be 8 at worst; 0 currently; 0 at best using VAS.   Pain location: Neck pain    Aggravating factors: Laying on stomach, when sleeping, forward bending, turning head, looking upward    Easing Factors:  Resting, ice,    Disturbed Sleep: No, usually sleeps back or side with pillow. No difficulty currently.     Pattern of pain questions:  1.  Where is your pain the worst? Neck   2.  Is your pain constant or intermittent? Intermittent  3.  Does bending forward make your typical pain worse? Yes   4.  Since the start of your back pain, has there been a change in your bowel or bladder? No   5.  What can't you do now that you use to be able to do? 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   Leisure: Member of [employer] (at least 2x weekly) wants to get to pool                     Pts goals:  Able to push gurneys, lie on her stomach, and strengthen her neck    Red Flag Screening:   Cough  Sneeze  Strain: (--)  Bladder/ bowel: (--)  Falls: (--)  Night pain: (--)  Unexplained weight loss: (--)  Swallowing: (--)  Dizziness: (--)  General health: Good     OBJECTIVE   Postural examination/scapula alignment: Affected scapula abducted  Joint integrity: Hypomobile throughout cervical spine     Sitting: Fair  Standing: Good   Correction of posture: better with lumbar roll    Range of Motion - MOVEMENT LOSS    ROM Loss   Flexion minimal loss 48   Extension minimal loss 52   Side bending Right moderate loss 25   Side bending Left moderate loss 28   Rotation Right moderate loss 55   Rotation Left moderate loss 50   Protraction minimal loss   Retraction  minimal loss       Upper Extremity Strength  (R) UE  (L) UE    Shoulder flexion: 5/5 Shoulder flexion: 5/5   Shoulder Abduction: 5/5 Shoulder abduction: 5/5   Elbow flexion: 5/5 Elbow flexion: 5/5   Elbow extension: 4+/5 Elbow extension: 4+/5   Wrist flexion: 5/5 Wrist flexion: 5/5   Wrist extension: 5/5 Wrist extension: 5/5    5/5 : 5/5   External rotation 4-/5 External rotation 4-/5   Internal  "rotation  4-/5 Internal rotation  4-/5     NEUROLOGICAL SCREEN    Sensory deficit: UE intact to light touch    Special Tests:   Test Name  Testing Result   Compression (--)   Distraction (+)   Neural Tension Test (--)   Saddle Sensation (--)     Reflexes:    Left Right   Biceps  2+ 2+   Brachioradialis  2+ 2+   Clonus (--) (--)   Babinski (--) (--)       REPEATED TEST MOVEMENTS:   Repeated Protraction in Sitting no effect   Repeated Flexion in Sitting better   Repeated Retraction in Sitting  better   Repeated Extension in Sitting no effect  "annoying"    Repeated Flexion in lying no effect   Repeated Retraction in lying no effect       Baseline Isometric Testing on Med X equipment:  Testing administered by PT  Date of testing: 10/24/2017  ROM 90-18 deg   Max Peak Torque 163    Min Peak Torque 121    Flex/Ext Ratio 1.3   % below normative data -29       GAIT:  Assistive Device used: none  Level of Assistance: independent  Patient displays the following gait deviations:  no gait deviations observed.       FOTO: Focus on Therapeutic Outcomes   Category: cervical   % Impaired: 10/50= 20% limitation   Current Score  = CJ = at least 20% but < 40% impaired, limited or restricted  Goal  = CI = at least 1% but < 20% impaired, limited or restricted    Score interpretation is as follows:   TEST SCORE  Modifier  Impairment Limitation Restriction    0/50  CH  0 % impaired, limited or restricted   1-9/50  CI  @ least 1% but less than 20% impaired, limited or restricted   10-19/50  CJ  @ least 20%<40% impaired, limited or restricted   20-29/50  CK  @ least 40%<60% impaired, limited or restricted   30-39/50  CL  @ least 60% <80% impaired, limited or restricted   40-49/50  CM  @ least 80%<100% impaired limited or restricted   50/50  CN  100% impaired, limited or restricted     Neck Disability Index Review 9/26/2017   Pain Intensity 0   Personal Care (Washing, Dressing, etc.) 0   Lifting 2   Reading 1   Headaches 0   Concentration 1 "   Work 1   Driving 1   Sleeping 3   Recreation 1   Score: 10         Treatment   Time In: 2:00  Time Out: 2:30    PT Evaluation Completed? Yes  Discussed Plan of Care with patient: Yes      Home Exercise Program as follows:   Handouts were given to the patient. Pt demo good understanding of the education provided. Jacek demonstrated good return demonstration of activities.     - Patient received education regarding proper posture and body mechanics.    - Pita roll tried, recommended, and purchase information was provided.    - Patient received a handout regarding anticipated muscular soreness following the isometric test and strategies for management were reviewed with patient including stretching, using ice and scheduled rest.       Pt was instructed in and performed the following:   Cardiovascular exercise and therapeutic exercise to improve posture, lumbar/cervical ROM, strength, and muscular endurance as follows:     Jacek received therapeutic exercises to develop/improve posture, lumbar/cervical ROM, strength and muscular endurance for 30 minutes including the following exercises:     [facility] 10/24/2017   Visit Number 1   VAS Pain Rating 0   Cervical Extension Seat Pad 0   Seat Adjustment 337   Top Dead Center 24   Counterweight 1   Cervical Flexion 90   Cervical Extension 18   Cervical Peak Torque 163   Cervical Weight 60   Ice - Z Lie (in min.) 10       Jacek received the following manual therapy techniques: Manual traction were applied to the: cervical spine for 5 minutes.   Assessment   This is a 57 y.o. female referred to [facility] and presents with a medical diagnosis of   Encounter Diagnosis   Name Primary?    Neck pain     and demonstrates limitations as described below in the problem list. Pt rehab potential is Good. Pt presents with Decreased cervical strength and ROM compared to norms, decreased UE functional ROM, decreased UE strength, decrease activity tolerance, fair  postural alignment, low pain severity and irritability, scapular dyskinsia     Pain Pattern: 1 LISA       Patient received education on the Healthy Back program, purpose of the isometric test, progression of back strengthening as well as wellness approach and systemic strengthening.  Details of the program were discussed.  Reviewed that patient should feel support/pressure from med ex restraints but no pain or discomfort and patient expressed understanding.    Based on the above history and physical examination an active physical therapy program is recommended.  Pt will continue to benefit from skilled outpatient physical therapy to address the deficits listed below in the chart, provide pt/family education and to maximize pt's level of independence in the home and community environment. .     No environmental, cultural, spiritual, developmental or education needs expressed or noted    Medical necessity is demonstrated by the following problem list.    Pt presents with the following impairments:   History  Co-morbidities and personal factors that may impact the plan of care Examination  Body Structures and Functions, activity limitations and participation restrictions that may impact the plan of care Clinical Presentation   Decision Making/ Complexity Score   Co-morbidities:   History of bilateral shld adhesive capsulitis        Personal Factors:   no deficits Body Regions:   neck  upper extremities    Body Systems:   gross symmetry  ROM  strength  gross coordinated movement    Activity limitations:   Learning and applying knowledge  no deficits    General Tasks and Commands  no deficits    Communication  no deficits    Mobility  lifting and carrying objects  driving (bike, car, motorcycle)    Self care  no deficits    Domestic Life  sleeping    Interactions/Relationships  no deficits    Life Areas  employment    Community and Social Life  community life  recreation and leisure    Participation Restrictions:   Increased pain with pushing/pulling, forward bending, when working     stable and uncomplicated   Low     GOALS: Pt is in agreement with the following goals.    Short term goals: 6 weeks or 10 visits   1.  Pt will demonstrate increased isometric torque by 5% from initial test indicating positive muscular response to program of progressive resistance training  2. Pt will demonstrate reduced pain and improved functional outcomes as reported on the patient centered questionnaires. Report 2-4 points reduction NDI indicating improvement in function and pain  3.. Pt will demonstrate independence with reducing or controlling symptoms with ther ex, movement, or position independently, able to reduce pain 1-2 points on pain scale using strategies taught in therapy  4. Pt will demonstrate increased maximum isometric torque value by 20% when compared to the initial value resulting in improved ability to perform bending, lifting, and carrying activities safely, confidently.        Long term goals: 13 weeks or 20 visits  1. Pt will demonstratte increased cervical ROM as measured by med ex by 6 degrees from initial test which results in full functional ROM of neck for ease with ADLs and driving  2. Pt will demonstrate increased isometric torque by 10% from initial test to improve ability to lift and carry.   3.Patient will demonstrate improved overall function per FOTO Survey to CI = at least 1% but < 20% impaired, limited or restricted score or less.   4. Pt will demonstrate independence with reducing or controlling symptoms with ther ex, movement, or position independently, able to reduce pain 2-4 points on pain scale using strategies taught in therapy  5. Pt will demonstrate independence with the HEP at discharge.     Plan   Outpatient physical therapy 2x week for 13 weeks or 20 visits to include the following:   - Patient education  - Therapeutic exercise  - Manual therapy  - Performance testing   - Neuromuscular  "Re-education  - Therapeutic activity   - Modalities    Pt may be seen by PTA as part of the rehabilitation team.     Therapist: Apryl Whipple, PT  10/24/2017      "I certify the need for these services furnished under this plan of treatment and while under my care."    ____________________________________  Physician/Referring Practitioner    _______________  Date of Signature          "

## 2017-10-31 ENCOUNTER — CLINICAL SUPPORT (OUTPATIENT)
Dept: REHABILITATION | Facility: OTHER | Age: 57
End: 2017-10-31
Attending: PHYSICAL MEDICINE & REHABILITATION
Payer: COMMERCIAL

## 2017-10-31 DIAGNOSIS — M54.2 NECK PAIN: ICD-10-CM

## 2017-10-31 NOTE — PROGRESS NOTES
[facility]      Name: Jacek PALACIOS [facility] Number: [id number]  Date of Treatment: 10/31/2017   Diagnosis: No diagnosis found.  Physician: Janis Kirby, *    Pain pattern determined: LISA  Plan of care signed: 10/24/17   Time in: 10:05am  Time Out: 11:05am  Total Treatment time: 60  Precautions:  B adhesive capsulitis  Visit #: 2    POC due:1/24/18  Reassessment due:11/24/17    Subjective   Jacek reports slight soreness following evaluation, which resolved the next day.  Pt also reports she had braces put on the next day following evaluation which may have increased neck pain as well.    Patient reports their pain to be 2/10 on a 0-10 scale with 0 being no pain and 10 being the worst pain imaginable.    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   Leisure: Member of [employer] (at least 2x weekly) wants to get to pool                     Pts goals:  Able to push gurneys, lie on her stomach, and strengthen her neck       Objective     Baseline Isometric Testing on Med X equipment:  Testing administered by PT  Date of testing: 10/24/2017  ROM 90-18 deg   Max Peak Torque 163    Min Peak Torque 121    Flex/Ext Ratio 1.3   % below normative data -29        OUTCOMES:   From Initial Evaluation  FOTO: Focus on Therapeutic Outcomes   Category: cervical   % Impaired: 10/50= 20% limitation   Current Score  = CJ = at least 20% but < 40% impaired, limited or restricted  Goal  = CI = at least 1% but < 20% impaired, limited or restricted    Score interpretation is as follows:   TEST SCORE  Modifier  Impairment Limitation Restriction    0/50  CH  0 % impaired, limited or restricted   1-9/50  CI  @ least 1% but less than 20% impaired, limited or restricted   10-19/50  CJ  @ least 20%<40% impaired, limited or restricted   20-29/50  CK  @ least 40%<60% impaired, limited or restricted   30-39/50  CL   @ least 60% <80% impaired, limited or restricted   40-49/50  CM  @ least 80%<100% impaired limited or restricted   50/50  CN  100% impaired, limited or restricted       Treatment    Pt was instructed in and performed the following:     Jacek received therapeutic exercises to develop/improved posture, cardiovascular endurance, muscular endurance, lumbar/cervical ROM, strength and muscular endurance for 50 minutes including the following exercises:     [facility] 10/31/2017   Visit Number 2   VAS Pain Rating 3   Treadmill Time (in min.) 10   Speed 3   Retraction in Sitting 10   Scapular Retraction 10   Cervical Extension Seat Pad -   Seat Adjustment -   Top Dead Center -   Counterweight -   Cervical Flexion -   Cervical Extension -   Cervical Peak Torque -   Cervical Weight 129   Repetitions 15   Rating of Perceived Exertion 3   Ice - Z Lie (in min.) 10       Peripheral muscle strengthening which included 1 set of 15-20 repetitions at a slow, controlled 7 second per rep pace focused on strengthening supporting musculature for improved body mechanics and functional mobility.  Pt and therapist focused on proper form during treatment to ensure optimal strengthening of each targeted muscle group.  Machines were utilized including torso rotation, leg extension, leg curl, chest press, upright row. Tricep extension, bicep curl, leg press, and hip abduction added on third visit.       Jacek received the following manual therapy techniques:none      Home Exercise Program as follows:   Scapula retraction/cervical retractions  Handouts were given to the patient. Pt demo good understanding of the education provided. Jacek demonstrated good return demonstration of activities. Reviewed HEP with patient    Lumbar roll use compliance: Pt utilizes roll in car  Additional exercises taught this treatment session: none, reviewed HEP    Assessment     Pt tolerated session well. Reports muscle fatigue with dynamic exercise at 80% of  peak torque(129in/lbs) Pt able to complete 15 reps with mod exertion.  Discussed progression of program through reps and weight increase in a controlled objective manner.  Pt instructed to stretch throughout the day with focus on maintaining good posture . Pt able to perform peripheral exercises without increased pain.  Continue to progress as tolerated  Patient is making good progress towards established goals.  Pt will continue to benefit from skilled outpatient physical therapy to address the deficits stated in the impairment chart, provide pt/family education and to maximize pt's level of independence in the home and community environment.       Pt's spiritual, cultural and educational needs considered and pt agreeable to plan of care and goals as stated below:     Medical necessity is demonstrated by the following IMPAIRMENTS/PROBLEMS:  dical necessity is demonstrated by the following problem list.    Pt presents with the following impairments:   History  Co-morbidities and personal factors that may impact the plan of care Examination  Body Structures and Functions, activity limitations and participation restrictions that may impact the plan of care Clinical Presentation Decision Making/ Complexity Score   Co-morbidities:   History of bilateral shld adhesive capsulitis           Personal Factors:   no deficits Body Regions:   neck  upper extremities     Body Systems:   gross symmetry  ROM  strength  gross coordinated movement     Activity limitations:   Learning and applying knowledge  no deficits     General Tasks and Commands  no deficits     Communication  no deficits     Mobility  lifting and carrying objects  driving (bike, car, motorcycle)     Self care  no deficits     Domestic Life  sleeping     Interactions/Relationships  no deficits     Life Areas  employment     Community and Social Life  community life  recreation and leisure     Participation Restrictions:  Increased pain with pushing/pulling,  forward bending, when working    stable and uncomplicated    Low            Goals:     GOALS: Pt is in agreement with the following goals.     Short term goals: 6 weeks or 10 visits   1.  Pt will demonstrate increased isometric torque by 5% from initial test indicating positive muscular response to program of progressive resistance training  2. Pt will demonstrate reduced pain and improved functional outcomes as reported on the patient centered questionnaires. Report 2-4 points reduction NDI indicating improvement in function and pain  3.. Pt will demonstrate independence with reducing or controlling symptoms with ther ex, movement, or position independently, able to reduce pain 1-2 points on pain scale using strategies taught in therapy  4. Pt will demonstrate increased maximum isometric torque value by 20% when compared to the initial value resulting in improved ability to perform bending, lifting, and carrying activities safely, confidently.           Long term goals: 13 weeks or 20 visits  1. Pt will demonstratte increased cervical ROM as measured by med ex by 6 degrees from initial test which results in full functional ROM of neck for ease with ADLs and driving  2. Pt will demonstrate increased isometric torque by 10% from initial test to improve ability to lift and carry.   3.Patient will demonstrate improved overall function per FOTO Survey to CI = at least 1% but < 20% impaired, limited or restricted score or less.   4. Pt will demonstrate independence with reducing or controlling symptoms with ther ex, movement, or position independently, able to reduce pain 2-4 points on pain scale using strategies taught in therapy  5. Pt will demonstrate independence with the HEP at discharge.        Plan   Continue with established Plan of Care towards established PT goals.

## 2017-11-16 ENCOUNTER — CLINICAL SUPPORT (OUTPATIENT)
Dept: REHABILITATION | Facility: OTHER | Age: 57
End: 2017-11-16
Attending: PHYSICAL MEDICINE & REHABILITATION
Payer: COMMERCIAL

## 2017-11-16 DIAGNOSIS — M54.2 NECK PAIN: ICD-10-CM

## 2017-11-16 NOTE — PROGRESS NOTES
[facility] Please change in computer. Therapy Treatment    Changed seat position to 379. Please change in computer next session.    Name: Jacek PALACIOS [place]  [facility] Number: [id number]  Date of Treatment: 11/16/2017   Diagnosis:   Encounter Diagnosis   Name Primary?    Neck pain      Physician: Janis Kirby, *    Pain pattern determined: LISA  Plan of care signed: 10/24/17   Time in: 1:30  Time Out: 2:30  Total Treatment time: 60  Precautions:  B adhesive capsulitis  Visit #: 3    POC due:1/24/18  Reassessment due:11/24/17    Face to Face discussion of patient was done between PT and PTA.     Subjective   Jacek reports no soreness post lost session. No neck pain today. She does her retractions sometimes.     Patient reports their pain to be 0/10 on a 0-10 scale with 0 being no pain and 10 being the worst pain imaginable.    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   Leisure: Member of [employer] (at least 2x weekly) wants to get to pool                     Pts goals:  Able to push gurneys, lie on her stomach, and strengthen her neck       Objective     Baseline Isometric Testing on Med X equipment:  Testing administered by PT  Date of testing: 10/24/2017  ROM 90-18 deg   Max Peak Torque 163    Min Peak Torque 121    Flex/Ext Ratio 1.3   % below normative data -29        OUTCOMES:   From Initial Evaluation  FOTO: Focus on Therapeutic Outcomes   Category: cervical   % Impaired: 10/50= 20% limitation   Current Score  = CJ = at least 20% but < 40% impaired, limited or restricted  Goal  = CI = at least 1% but < 20% impaired, limited or restricted    Score interpretation is as follows:   TEST SCORE  Modifier  Impairment Limitation Restriction    0/50  CH  0 % impaired, limited or restricted   1-9/50  CI  @ least 1% but less than 20% impaired, limited or restricted   10-19/50  CJ  @ least 20%<40% impaired, limited or  restricted   20-29/50  CK  @ least 40%<60% impaired, limited or restricted   30-39/50  CL  @ least 60% <80% impaired, limited or restricted   40-49/50  CM  @ least 80%<100% impaired limited or restricted   50/50  CN  100% impaired, limited or restricted       Treatment    Pt was instructed in and performed the following:     Jacek received therapeutic exercises to develop/improved posture, cardiovascular endurance, muscular endurance, lumbar/cervical ROM, strength and muscular endurance for 50 minutes including the following exercises:     [facility] 11/16/2017   Visit Number 3   VAS Pain Rating 0   Treadmill Time (in min.) 10   Speed 3   Retraction in Sitting 10   Scapular Retraction 10   Cervical Weight 129   Repetitions 20   Rating of Perceived Exertion 4   Ice - Z Lie (in min.) 10       Peripheral muscle strengthening which included 1 set of 15-20 repet.hbtheritions at a slow, controlled 7 second per rep pace focused on strengthening supporting musculature for improved body mechanics and functional mobility.  Pt and therapist focused on proper form during treatment to ensure optimal strengthening of each targeted muscle group.  Machines were utilized including torso rotation, leg extension, leg curl, chest press, upright row. Tricep extension, bicep curl, leg press, and hip abduction added on third visit.       Jacek received the following manual therapy techniques:none      Home Exercise Program as follows:   Scapula retraction  cervical retractions  Handouts were given to the patient. Pt demo good understanding of the education provided. Jacek demonstrated good return demonstration of activities. Reviewed HEP with patient    Lumbar roll use compliance: Pt utilizes roll in car  Additional exercises taught this treatment session: none, reviewed HEP    Assessment     Pt tolerated session well with no neck pain. Mod vc on her HEP. Pt tolerated cervical machine with no c/o neck pain.  Educated pt to perform  her HEP 3x/day and she understood.  Pt instructed to stretch throughout the day with focus on maintaining good posture . Pt able to perform peripheral exercises without increased pain.Changed seat position to 379 due to pt on comfortable extending back. Please change in computer next session.      Continue to progress as tolerated  Patient is making good progress towards established goals.  Pt will continue to benefit from skilled outpatient physical therapy to address the deficits stated in the impairment chart, provide pt/family education and to maximize pt's level of independence in the home and community environment.       Pt's spiritual, cultural and educational needs considered and pt agreeable to plan of care and goals as stated below:     Medical necessity is demonstrated by the following IMPAIRMENTS/PROBLEMS:  dical necessity is demonstrated by the following problem list.    Pt presents with the following impairments:   History  Co-morbidities and personal factors that may impact the plan of care Examination  Body Structures and Functions, activity limitations and participation restrictions that may impact the plan of care Clinical Presentation Decision Making/ Complexity Score   Co-morbidities:   History of bilateral shld adhesive capsulitis           Personal Factors:   no deficits Body Regions:   neck  upper extremities     Body Systems:   gross symmetry  ROM  strength  gross coordinated movement     Activity limitations:   Learning and applying knowledge  no deficits     General Tasks and Commands  no deficits     Communication  no deficits     Mobility  lifting and carrying objects  driving (bike, car, motorcycle)     Self care  no deficits     Domestic Life  sleeping     Interactions/Relationships  no deficits     Life Areas  employment     Community and Social Life  community life  recreation and leisure     Participation Restrictions:  Increased pain with pushing/pulling, forward bending, when  working    stable and uncomplicated    Low            Goals:     GOALS: Pt is in agreement with the following goals.     Short term goals: 6 weeks or 10 visits   1.  Pt will demonstrate increased isometric torque by 5% from initial test indicating positive muscular response to program of progressive resistance training  2. Pt will demonstrate reduced pain and improved functional outcomes as reported on the patient centered questionnaires. Report 2-4 points reduction NDI indicating improvement in function and pain  3.. Pt will demonstrate independence with reducing or controlling symptoms with ther ex, movement, or position independently, able to reduce pain 1-2 points on pain scale using strategies taught in therapy  4. Pt will demonstrate increased maximum isometric torque value by 20% when compared to the initial value resulting in improved ability to perform bending, lifting, and carrying activities safely, confidently.           Long term goals: 13 weeks or 20 visits  1. Pt will demonstratte increased cervical ROM as measured by med ex by 6 degrees from initial test which results in full functional ROM of neck for ease with ADLs and driving  2. Pt will demonstrate increased isometric torque by 10% from initial test to improve ability to lift and carry.   3.Patient will demonstrate improved overall function per FOTO Survey to CI = at least 1% but < 20% impaired, limited or restricted score or less.   4. Pt will demonstrate independence with reducing or controlling symptoms with ther ex, movement, or position independently, able to reduce pain 2-4 points on pain scale using strategies taught in therapy  5. Pt will demonstrate independence with the HEP at discharge.        Plan   Continue with established Plan of Care towards established PT goals.

## 2017-11-21 ENCOUNTER — CLINICAL SUPPORT (OUTPATIENT)
Dept: REHABILITATION | Facility: OTHER | Age: 57
End: 2017-11-21
Attending: PHYSICAL MEDICINE & REHABILITATION
Payer: COMMERCIAL

## 2017-11-21 DIAGNOSIS — M54.2 NECK PAIN: ICD-10-CM

## 2017-11-21 NOTE — PROGRESS NOTES
[facility] Please change in computer. Therapy Treatment    Changed seat position to 379. Please change in computer next session.    Name: Jacek PALACIOS [place]  [facility] Number: [id number]  Date of Treatment: 11/21/2017   Diagnosis:   Encounter Diagnosis   Name Primary?    Neck pain      Physician: Janis Kirby, *    Pain pattern determined: LISA  Plan of care signed: 10/24/17   Time in: 10:30  Time Out: 11:30AM  Total Treatment time: 60  Precautions:  B adhesive capsulitis  Visit #: 4    POC due:1/24/18  Reassessment due:11/24/17    Face to Face discussion of patient was done between PT and PTA.     Subjective   Jacek reports no pain today. Reports feeling better since starting [facility]    Patient reports their pain to be 0/10 on a 0-10 scale with 0 being no pain and 10 being the worst pain imaginable.    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   Leisure: Member of [employer] (at least 2x weekly) wants to get to pool                     Pts goals:  Able to push gurneys, lie on her stomach, and strengthen her neck       Objective     Baseline Isometric Testing on Med X equipment:  Testing administered by PT  Date of testing: 10/24/2017  ROM 90-18 deg   Max Peak Torque 163    Min Peak Torque 121    Flex/Ext Ratio 1.3   % below normative data -29        OUTCOMES:   From Initial Evaluation  FOTO: Focus on Therapeutic Outcomes   Category: cervical   % Impaired: 10/50= 20% limitation   Current Score  = CJ = at least 20% but < 40% impaired, limited or restricted  Goal  = CI = at least 1% but < 20% impaired, limited or restricted    Score interpretation is as follows:   TEST SCORE  Modifier  Impairment Limitation Restriction    0/50  CH  0 % impaired, limited or restricted   1-9/50  CI  @ least 1% but less than 20% impaired, limited or restricted   10-19/50  CJ  @ least 20%<40% impaired, limited or restricted   20-29/50  CK   @ least 40%<60% impaired, limited or restricted   30-39/50  CL  @ least 60% <80% impaired, limited or restricted   40-49/50  CM  @ least 80%<100% impaired limited or restricted   50/50  CN  100% impaired, limited or restricted       Treatment    Pt was instructed in and performed the following:     Jacek received therapeutic exercises to develop/improved posture, cardiovascular endurance, muscular endurance, lumbar/cervical ROM, strength and muscular endurance for 40 minutes including the following exercises:     [facility] 11/21/2017   Visit Number 4   VAS Pain Rating 0   Treadmill Time (in min.) 10   Speed 3   Retraction in Sitting 10   Scapular Retraction 10   Cervical Extension Seat Pad -   Seat Adjustment 379   Top Dead Center -   Counterweight -   Cervical Flexion -   Cervical Extension -   Cervical Peak Torque -   Cervical Weight 135   Repetitions 20   Rating of Perceived Exertion 5   Ice - Z Lie (in min.) 10         Peripheral muscle strengthening which included 1 set of 15-20 repet.hbtheritions at a slow, controlled 7 second per rep pace focused on strengthening supporting musculature for improved body mechanics and functional mobility.  Pt and therapist focused on proper form during treatment to ensure optimal strengthening of each targeted muscle group.  Machines were utilized including torso rotation, leg extension, leg curl, chest press, upright row. Tricep extension, bicep curl, leg press, and hip abduction added on third visit.       Jacek received the following manual therapy techniques:none      Home Exercise Program as follows:   Scapula retraction  cervical retractions  Handouts were given to the patient. Pt demo good understanding of the education provided. Jacek demonstrated good return demonstration of activities. Reviewed HEP with patient    Lumbar roll use compliance: Pt utilizes roll in car  Additional exercises taught this treatment session: none, reviewed HEP    Assessment     Pt  tolerated session well without discomfort noted.  Increased Cervical Med x weights by 5%.  Pt's ROM improved with examiner noting full flexion and extension today.  Pt is not having any pain with ADL's currently.  Pt is making excellent progress towards all goals, continue to progress as tolerated.    Continue to progress as tolerated  Patient is making good progress towards established goals.  Pt will continue to benefit from skilled outpatient physical therapy to address the deficits stated in the impairment chart, provide pt/family education and to maximize pt's level of independence in the home and community environment.       Pt's spiritual, cultural and educational needs considered and pt agreeable to plan of care and goals as stated below:     Medical necessity is demonstrated by the following IMPAIRMENTS/PROBLEMS:  dical necessity is demonstrated by the following problem list.    Pt presents with the following impairments:   History  Co-morbidities and personal factors that may impact the plan of care Examination  Body Structures and Functions, activity limitations and participation restrictions that may impact the plan of care Clinical Presentation Decision Making/ Complexity Score   Co-morbidities:   History of bilateral shld adhesive capsulitis           Personal Factors:   no deficits Body Regions:   neck  upper extremities     Body Systems:   gross symmetry  ROM  strength  gross coordinated movement     Activity limitations:   Learning and applying knowledge  no deficits     General Tasks and Commands  no deficits     Communication  no deficits     Mobility  lifting and carrying objects  driving (bike, car, motorcycle)     Self care  no deficits     Domestic Life  sleeping     Interactions/Relationships  no deficits     Life Areas  employment     Community and Social Life  community life  recreation and leisure     Participation Restrictions:  Increased pain with pushing/pulling, forward bending, when  working    stable and uncomplicated    Low            Goals:     GOALS: Pt is in agreement with the following goals.     Short term goals: 6 weeks or 10 visits   1.  Pt will demonstrate increased isometric torque by 5% from initial test indicating positive muscular response to program of progressive resistance training  2. Pt will demonstrate reduced pain and improved functional outcomes as reported on the patient centered questionnaires. Report 2-4 points reduction NDI indicating improvement in function and pain  3.. Pt will demonstrate independence with reducing or controlling symptoms with ther ex, movement, or position independently, able to reduce pain 1-2 points on pain scale using strategies taught in therapy  4. Pt will demonstrate increased maximum isometric torque value by 20% when compared to the initial value resulting in improved ability to perform bending, lifting, and carrying activities safely, confidently.           Long term goals: 13 weeks or 20 visits  1. Pt will demonstratte increased cervical ROM as measured by med ex by 6 degrees from initial test which results in full functional ROM of neck for ease with ADLs and driving  2. Pt will demonstrate increased isometric torque by 10% from initial test to improve ability to lift and carry.   3.Patient will demonstrate improved overall function per FOTO Survey to CI = at least 1% but < 20% impaired, limited or restricted score or less.   4. Pt will demonstrate independence with reducing or controlling symptoms with ther ex, movement, or position independently, able to reduce pain 2-4 points on pain scale using strategies taught in therapy  5. Pt will demonstrate independence with the HEP at discharge.        Plan   Continue with established Plan of Care towards established PT goals.

## 2017-11-28 ENCOUNTER — CLINICAL SUPPORT (OUTPATIENT)
Dept: REHABILITATION | Facility: OTHER | Age: 57
End: 2017-11-28
Attending: PHYSICAL MEDICINE & REHABILITATION
Payer: COMMERCIAL

## 2017-11-28 DIAGNOSIS — M54.2 NECK PAIN: ICD-10-CM

## 2017-11-28 NOTE — PROGRESS NOTES
[facility] Please change in computer. Therapy Treatment    Changed seat position to 379. Please change in computer next session.    Name: Jacek PALACIOS [place]  [facility] Number: [id number]  Date of Treatment: 11/28/2017   Diagnosis:   Encounter Diagnosis   Name Primary?    Neck pain      Physician: Janis Kirby, *    Pain pattern determined: LISA  Plan of care signed: 10/24/17   Time in: 10:35  Time Out: 11:35AM  Total Treatment time: 60  Precautions:  B adhesive capsulitis  Visit #: 5    POC due:1/24/18  Reassessment due:11/24/17    Face to Face discussion of patient was done between PT and PTA.     Subjective   Jacek reports no pain today. Reports feeling better since starting [facility].  She is doing well with her HEP.     Patient reports their pain to be 0/10 on a 0-10 scale with 0 being no pain and 10 being the worst pain imaginable.    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   Leisure: Member of [employer] (at least 2x weekly) wants to get to pool                     Pts goals:  Able to push gurneys, lie on her stomach, and strengthen her neck       Objective     Baseline Isometric Testing on Med X equipment:  Testing administered by PT  Date of testing: 10/24/2017  ROM 90-18 deg   Max Peak Torque 163    Min Peak Torque 121    Flex/Ext Ratio 1.3   % below normative data -29        OUTCOMES:   From Initial Evaluation  FOTO: Focus on Therapeutic Outcomes   Category: cervical   % Impaired: 10/50= 20% limitation   Current Score  = CJ = at least 20% but < 40% impaired, limited or restricted  Goal  = CI = at least 1% but < 20% impaired, limited or restricted    Score interpretation is as follows:   TEST SCORE  Modifier  Impairment Limitation Restriction    0/50  CH  0 % impaired, limited or restricted   1-9/50  CI  @ least 1% but less than 20% impaired, limited or restricted   10-19/50  CJ  @ least 20%<40% impaired,  limited or restricted   20-29/50  CK  @ least 40%<60% impaired, limited or restricted   30-39/50  CL  @ least 60% <80% impaired, limited or restricted   40-49/50  CM  @ least 80%<100% impaired limited or restricted   50/50  CN  100% impaired, limited or restricted       Treatment    Pt was instructed in and performed the following:     Jacek received therapeutic exercises to develop/improved posture, cardiovascular endurance, muscular endurance, lumbar/cervical ROM, strength and muscular endurance for 40 minutes including the following exercises:     [facility] 11/28/2017   Visit Number 5   VAS Pain Rating 0   Treadmill Time (in min.) 10   Speed 3   Retraction in Sitting 10   Scapular Retraction 10   Cervical Weight 141   Repetitions 18   Rating of Perceived Exertion 4   Ice - Z Lie (in min.) 10           Peripheral muscle strengthening which included 1 set of 15-20 repet.hbtheritions at a slow, controlled 7 second per rep pace focused on strengthening supporting musculature for improved body mechanics and functional mobility.  Pt and therapist focused on proper form during treatment to ensure optimal strengthening of each targeted muscle group.  Machines were utilized including torso rotation, leg extension, leg curl, chest press, upright row. Tricep extension, bicep curl, leg press, and hip abduction.       Jacek received the following manual therapy techniques:none      Home Exercise Program as follows:   Scapula retraction  cervical retractions  Handouts were given to the patient. Pt demo good understanding of the education provided. Jacek demonstrated good return demonstration of activities. Reviewed HEP with patient    Lumbar roll use compliance: Pt utilizes roll in car  Additional exercises taught this treatment session: none, reviewed HEP    Assessment     Pt tolerated session well without discomfort noted.  Increased Cervical Med x weights by 5%.  Pt's ROM improved with examiner noting full flexion  and extension today.  Pt is not having any pain with ADL's currently.  Pt is making excellent progress towards all goals, continue to progress as tolerated. She is progressing well.   Pt will continue to benefit from skilled outpatient physical therapy to address the deficits stated in the impairment chart, provide pt/family education and to maximize pt's level of independence in the home and community environment.       Pt's spiritual, cultural and educational needs considered and pt agreeable to plan of care and goals as stated below:     Medical necessity is demonstrated by the following IMPAIRMENTS/PROBLEMS:  dical necessity is demonstrated by the following problem list.    Pt presents with the following impairments:   History  Co-morbidities and personal factors that may impact the plan of care Examination  Body Structures and Functions, activity limitations and participation restrictions that may impact the plan of care Clinical Presentation Decision Making/ Complexity Score   Co-morbidities:   History of bilateral shld adhesive capsulitis           Personal Factors:   no deficits Body Regions:   neck  upper extremities     Body Systems:   gross symmetry  ROM  strength  gross coordinated movement     Activity limitations:   Learning and applying knowledge  no deficits     General Tasks and Commands  no deficits     Communication  no deficits     Mobility  lifting and carrying objects  driving (bike, car, motorcycle)     Self care  no deficits     Domestic Life  sleeping     Interactions/Relationships  no deficits     Life Areas  employment     Community and Social Life  community life  recreation and leisure     Participation Restrictions:  Increased pain with pushing/pulling, forward bending, when working    stable and uncomplicated    Low            Goals:     GOALS: Pt is in agreement with the following goals.     Short term goals: 6 weeks or 10 visits   1.  Pt will demonstrate increased isometric torque by  5% from initial test indicating positive muscular response to program of progressive resistance training  2. Pt will demonstrate reduced pain and improved functional outcomes as reported on the patient centered questionnaires. Report 2-4 points reduction NDI indicating improvement in function and pain  3.. Pt will demonstrate independence with reducing or controlling symptoms with ther ex, movement, or position independently, able to reduce pain 1-2 points on pain scale using strategies taught in therapy  4. Pt will demonstrate increased maximum isometric torque value by 20% when compared to the initial value resulting in improved ability to perform bending, lifting, and carrying activities safely, confidently.           Long term goals: 13 weeks or 20 visits  1. Pt will demonstratte increased cervical ROM as measured by med ex by 6 degrees from initial test which results in full functional ROM of neck for ease with ADLs and driving  2. Pt will demonstrate increased isometric torque by 10% from initial test to improve ability to lift and carry.   3.Patient will demonstrate improved overall function per FOTO Survey to CI = at least 1% but < 20% impaired, limited or restricted score or less.   4. Pt will demonstrate independence with reducing or controlling symptoms with ther ex, movement, or position independently, able to reduce pain 2-4 points on pain scale using strategies taught in therapy  5. Pt will demonstrate independence with the HEP at discharge.        Plan   Continue with established Plan of Care towards established PT goals.

## 2017-11-30 ENCOUNTER — CLINICAL SUPPORT (OUTPATIENT)
Dept: REHABILITATION | Facility: OTHER | Age: 57
End: 2017-11-30
Attending: PHYSICAL MEDICINE & REHABILITATION
Payer: COMMERCIAL

## 2017-11-30 DIAGNOSIS — M54.2 NECK PAIN: ICD-10-CM

## 2017-11-30 NOTE — PROGRESS NOTES
[facility] Please change in computer. Therapy Treatment    Changed seat position to 379. Please change in computer next session.    Name: Jacek PALACIOS [place]  [facility] Number: [id number]  Date of Treatment: 11/30/2017   Diagnosis:   Encounter Diagnosis   Name Primary?    Neck pain      Physician: Janis Kirby, *    Pain pattern determined: LISA  Plan of care signed: 10/24/17   Time in: 10:35  Time Out: 11:35AM  Total Treatment time: 60  Precautions:  B adhesive capsulitis  Visit #: 6    POC due:1/24/18  Reassessment due:11/24/17    Face to Face discussion of patient was done between PT and PTA.     Subjective   Jacek reports being stiffness, but no pain today.    Patient reports their pain to be 0/10 on a 0-10 scale with 0 being no pain and 10 being the worst pain imaginable.    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   Leisure: Member of [employer] (at least 2x weekly) wants to get to pool                     Pts goals:  Able to push gurneys, lie on her stomach, and strengthen her neck       Objective     Baseline Isometric Testing on Med X equipment:  Testing administered by PT  Date of testing: 10/24/2017  ROM 90-18 deg   Max Peak Torque 163    Min Peak Torque 121    Flex/Ext Ratio 1.3   % below normative data -29        OUTCOMES:   From Initial Evaluation  FOTO: Focus on Therapeutic Outcomes   Category: cervical   % Impaired: 10/50= 20% limitation   Current Score  = CJ = at least 20% but < 40% impaired, limited or restricted  Goal  = CI = at least 1% but < 20% impaired, limited or restricted    Score interpretation is as follows:   TEST SCORE  Modifier  Impairment Limitation Restriction    0/50  CH  0 % impaired, limited or restricted   1-9/50  CI  @ least 1% but less than 20% impaired, limited or restricted   10-19/50  CJ  @ least 20%<40% impaired, limited or restricted   20-29/50  CK  @ least 40%<60%  impaired, limited or restricted   30-39/50  CL  @ least 60% <80% impaired, limited or restricted   40-49/50  CM  @ least 80%<100% impaired limited or restricted   50/50  CN  100% impaired, limited or restricted       Treatment    Pt was instructed in and performed the following:     Jacek received therapeutic exercises to develop/improved posture, cardiovascular endurance, muscular endurance, lumbar/cervical ROM, strength and muscular endurance for 40 minutes including the following exercises:     [facility] 11/30/2017   Visit Number 6   VAS Pain Rating 0   Treadmill Time (in min.) 10   Speed 3   Retraction in Sitting 10   Scapular Retraction 10   Cervical Extension Seat Pad -   Seat Adjustment -   Top Dead Center -   Counterweight -   Cervical Flexion -   Cervical Extension -   Cervical Peak Torque -   Cervical Weight 141   Repetitions 20   Rating of Perceived Exertion 5   Ice - Z Lie (in min.) 10             Peripheral muscle strengthening which included 1 set of 15-20 repet.hbtheritions at a slow, controlled 7 second per rep pace focused on strengthening supporting musculature for improved body mechanics and functional mobility.  Pt and therapist focused on proper form during treatment to ensure optimal strengthening of each targeted muscle group.  Machines were utilized including torso rotation, leg extension, leg curl, chest press, upright row. Tricep extension, bicep curl, leg press, and hip abduction.       Jacek received the following manual therapy techniques:none      Home Exercise Program as follows:   Scapula retraction  cervical retractions  Handouts were given to the patient. Pt demo good understanding of the education provided. Jacek demonstrated good return demonstration of activities. Reviewed HEP with patient    Lumbar roll use compliance: Pt utilizes roll in car  Additional exercises taught this treatment session: none, reviewed HEP    Assessment     Pt tolerated treatment well and reports  decreased stiffness following exercises.  Pt reports overall her neck is feeling better and she is able to do more without pain.  Continue to progress as tolerated. Reviewed cervical retractions today, pt was having pain was at end range of motion. Pt  Instructed to go within pain free range.  Pt will continue to benefit from skilled outpatient physical therapy to address the deficits stated in the impairment chart, provide pt/family education and to maximize pt's level of independence in the home and community environment.       Pt's spiritual, cultural and educational needs considered and pt agreeable to plan of care and goals as stated below:     Medical necessity is demonstrated by the following IMPAIRMENTS/PROBLEMS:  dical necessity is demonstrated by the following problem list.    Pt presents with the following impairments:   History  Co-morbidities and personal factors that may impact the plan of care Examination  Body Structures and Functions, activity limitations and participation restrictions that may impact the plan of care Clinical Presentation Decision Making/ Complexity Score   Co-morbidities:   History of bilateral shld adhesive capsulitis           Personal Factors:   no deficits Body Regions:   neck  upper extremities     Body Systems:   gross symmetry  ROM  strength  gross coordinated movement     Activity limitations:   Learning and applying knowledge  no deficits     General Tasks and Commands  no deficits     Communication  no deficits     Mobility  lifting and carrying objects  driving (bike, car, motorcycle)     Self care  no deficits     Domestic Life  sleeping     Interactions/Relationships  no deficits     Life Areas  employment     Community and Social Life  community life  recreation and leisure     Participation Restrictions:  Increased pain with pushing/pulling, forward bending, when working    stable and uncomplicated    Low            Goals:     GOALS: Pt is in agreement with the  following goals.     Short term goals: 6 weeks or 10 visits   1.  Pt will demonstrate increased isometric torque by 5% from initial test indicating positive muscular response to program of progressive resistance training  2. Pt will demonstrate reduced pain and improved functional outcomes as reported on the patient centered questionnaires. Report 2-4 points reduction NDI indicating improvement in function and pain  3.. Pt will demonstrate independence with reducing or controlling symptoms with ther ex, movement, or position independently, able to reduce pain 1-2 points on pain scale using strategies taught in therapy  4. Pt will demonstrate increased maximum isometric torque value by 20% when compared to the initial value resulting in improved ability to perform bending, lifting, and carrying activities safely, confidently.           Long term goals: 13 weeks or 20 visits  1. Pt will demonstratte increased cervical ROM as measured by med ex by 6 degrees from initial test which results in full functional ROM of neck for ease with ADLs and driving  2. Pt will demonstrate increased isometric torque by 10% from initial test to improve ability to lift and carry.   3.Patient will demonstrate improved overall function per FOTO Survey to CI = at least 1% but < 20% impaired, limited or restricted score or less.   4. Pt will demonstrate independence with reducing or controlling symptoms with ther ex, movement, or position independently, able to reduce pain 2-4 points on pain scale using strategies taught in therapy  5. Pt will demonstrate independence with the HEP at discharge.        Plan   Continue with established Plan of Care towards established PT goals.

## 2017-12-11 ENCOUNTER — CLINICAL SUPPORT (OUTPATIENT)
Dept: REHABILITATION | Facility: OTHER | Age: 57
End: 2017-12-11
Attending: PHYSICAL MEDICINE & REHABILITATION
Payer: COMMERCIAL

## 2017-12-11 DIAGNOSIS — M54.2 NECK PAIN: ICD-10-CM

## 2017-12-11 NOTE — PROGRESS NOTES
[facility] Please change in computer. Therapy Treatment    Changed seat position to 379. Please change in computer next session.    Name: Jacek PALACIOS [place]  [facility] Number: [id number]  Date of Treatment: 12/11/2017   Diagnosis:   Encounter Diagnosis   Name Primary?    Neck pain      Physician: Janis Kirby, *    Pain pattern determined: LISA  Plan of care signed: 10/24/17   Time in: 10:35  Time Out: 11:35AM  Total Treatment time: 60  Precautions:  B adhesive capsulitis  Visit #: 7    POC due:1/24/18  Reassessment due: 1/11/2018    Subjective   Jacek reports no pain at this time, states she has been doing the exercises at home and can tell a difference    Patient reports their pain to be 0/10 on a 0-10 scale with 0 being no pain and 10 being the worst pain imaginable.    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   Leisure: Member of [employer] (at least 2x weekly) wants to get to pool                     Pts goals:  Able to push gurneys, lie on her stomach, and strengthen her neck       Objective     Cervical ROM 12/11/17  Flexion Moderate loss   Extesnion Minimal Loss   Side Bending Right Moderate loss   Side bending Left Moderate loss   Rotation Right Minimal loss   Rotation Left Minimal loss        Baseline Isometric Testing on Med X equipment:  Testing administered by PT  Date of testing: 10/24/2017  ROM 90-18 deg   Max Peak Torque 163    Min Peak Torque 121    Flex/Ext Ratio 1.3   % below normative data -29        OUTCOMES:   From Initial Evaluation  FOTO: Focus on Therapeutic Outcomes   Category: cervical   % Impaired: 10/50= 20% limitation   Current Score  = CJ = at least 20% but < 40% impaired, limited or restricted  Goal  = CI = at least 1% but < 20% impaired, limited or restricted    Score interpretation is as follows:   TEST SCORE  Modifier  Impairment Limitation Restriction    0/50  CH  0 %  impaired, limited or restricted   1-9/50  CI  @ least 1% but less than 20% impaired, limited or restricted   10-19/50  CJ  @ least 20%<40% impaired, limited or restricted   20-29/50  CK  @ least 40%<60% impaired, limited or restricted   30-39/50  CL  @ least 60% <80% impaired, limited or restricted   40-49/50  CM  @ least 80%<100% impaired limited or restricted   50/50  CN  100% impaired, limited or restricted       Treatment    Pt was instructed in and performed the following:     Jacek received therapeutic exercises to develop/improved posture, cardiovascular endurance, muscular endurance, lumbar/cervical ROM, strength and muscular endurance for 40 minutes including the following exercises:    [facility] 12/11/2017   Visit Number 7   VAS Pain Rating 0   Treadmill Time (in min.) -   Speed -   Recumbent Bike Seat Pos. 14   Time 10   Retraction in Sitting 10   Scapular Retraction 10   Cervical Extension Seat Pad -   Seat Adjustment 442   Top Dead Center -   Counterweight -   Cervical Flexion -   Cervical Extension -   Cervical Peak Torque -   Cervical Weight 120   Repetitions 20   Rating of Perceived Exertion 3.5   Ice - Z Lie (in min.) 10     Cervical extension with towel 10x  Upper Trap Stretch 10x    Peripheral muscle strengthening which included 1 set of 15-20 repet.hbtheritions at a slow, controlled 7 second per rep pace focused on strengthening supporting musculature for improved body mechanics and functional mobility.  Pt and therapist focused on proper form during treatment to ensure optimal strengthening of each targeted muscle group.  Machines were utilized including torso rotation, leg extension, leg curl, chest press, upright row. Tricep extension, bicep curl, leg press, and hip abduction.       Jacek received the following manual therapy techniques:none      Home Exercise Program as follows:   Scapula retraction  cervical retractions  Handouts were given to the patient. Pt demo good understanding  of the education provided. Jacek demonstrated good return demonstration of activities. Reviewed HEP with patient    Lumbar roll use compliance: Pt utilizes roll in car  Additional exercises taught this treatment session: none, reviewed HEP    Assessment     Pt was able to complete treatment without increase in c/o pain in the neck or shoulders, pt needs to increase cervical ROM, will continue to stretch as tolerated. Increased seat level, pt was feeling uncomfortable and she was able to complete the cervical med X better after the seat adjustment. Pt was able to increase weight on the Cervical MedX and able to complete 20 repetitions.  Pt will continue to benefit from skilled outpatient physical therapy to address the deficits stated in the impairment chart, provide pt/family education and to maximize pt's level of independence in the home and community environment.       Pt's spiritual, cultural and educational needs considered and pt agreeable to plan of care and goals as stated below:     Medical necessity is demonstrated by the following IMPAIRMENTS/PROBLEMS:  dical necessity is demonstrated by the following problem list.    Pt presents with the following impairments:   History  Co-morbidities and personal factors that may impact the plan of care Examination  Body Structures and Functions, activity limitations and participation restrictions that may impact the plan of care Clinical Presentation Decision Making/ Complexity Score   Co-morbidities:   History of bilateral shld adhesive capsulitis           Personal Factors:   no deficits Body Regions:   neck  upper extremities     Body Systems:   gross symmetry  ROM  strength  gross coordinated movement     Activity limitations:   Learning and applying knowledge  no deficits     General Tasks and Commands  no deficits     Communication  no deficits     Mobility  lifting and carrying objects  driving (bike, car, motorcycle)     Self care  no deficits     Domestic  Life  sleeping     Interactions/Relationships  no deficits     Life Areas  employment     Community and Social Life  community life  recreation and leisure     Participation Restrictions:  Increased pain with pushing/pulling, forward bending, when working    stable and uncomplicated    Low            Goals:     GOALS: Pt is in agreement with the following goals.     Short term goals: 6 weeks or 10 visits   1.  Pt will demonstrate increased isometric torque by 5% from initial test indicating positive muscular response to program of progressive resistance training  2. Pt will demonstrate reduced pain and improved functional outcomes as reported on the patient centered questionnaires. Report 2-4 points reduction NDI indicating improvement in function and pain  3.. Pt will demonstrate independence with reducing or controlling symptoms with ther ex, movement, or position independently, able to reduce pain 1-2 points on pain scale using strategies taught in therapy  4. Pt will demonstrate increased maximum isometric torque value by 20% when compared to the initial value resulting in improved ability to perform bending, lifting, and carrying activities safely, confidently.           Long term goals: 13 weeks or 20 visits  1. Pt will demonstratte increased cervical ROM as measured by med ex by 6 degrees from initial test which results in full functional ROM of neck for ease with ADLs and driving  2. Pt will demonstrate increased isometric torque by 10% from initial test to improve ability to lift and carry.   3.Patient will demonstrate improved overall function per FOTO Survey to CI = at least 1% but < 20% impaired, limited or restricted score or less.   4. Pt will demonstrate independence with reducing or controlling symptoms with ther ex, movement, or position independently, able to reduce pain 2-4 points on pain scale using strategies taught in therapy  5. Pt will demonstrate independence with the HEP at discharge.         Plan   Continue with established Plan of Care towards established PT goals.

## 2017-12-13 ENCOUNTER — OFFICE VISIT (OUTPATIENT)
Dept: OBSTETRICS AND GYNECOLOGY | Facility: CLINIC | Age: 57
End: 2017-12-13
Attending: OBSTETRICS & GYNECOLOGY
Payer: COMMERCIAL

## 2017-12-13 VITALS
SYSTOLIC BLOOD PRESSURE: 110 MMHG | WEIGHT: 153.69 LBS | DIASTOLIC BLOOD PRESSURE: 82 MMHG | HEIGHT: 66 IN | BODY MASS INDEX: 24.7 KG/M2

## 2017-12-13 DIAGNOSIS — Z12.4 PAP SMEAR FOR CERVICAL CANCER SCREENING: ICD-10-CM

## 2017-12-13 DIAGNOSIS — Z78.0 POSTMENOPAUSAL STATUS: ICD-10-CM

## 2017-12-13 DIAGNOSIS — Z01.419 WELL WOMAN EXAM WITH ROUTINE GYNECOLOGICAL EXAM: Primary | ICD-10-CM

## 2017-12-13 PROCEDURE — 88175 CYTOPATH C/V AUTO FLUID REDO: CPT

## 2017-12-13 PROCEDURE — 99999 PR PBB SHADOW E&M-EST. PATIENT-LVL III: CPT | Mod: PBBFAC,,, | Performed by: OBSTETRICS & GYNECOLOGY

## 2017-12-13 PROCEDURE — 99396 PREV VISIT EST AGE 40-64: CPT | Mod: S$GLB,,, | Performed by: OBSTETRICS & GYNECOLOGY

## 2017-12-13 NOTE — PROGRESS NOTES
Jacek Duran is a 57 y.o. year old  female who presents for routine GYN exam.  She is postmenopausal, not on HRT.  No bleeding.  Denies hot flashes / sweats.  Reports having kidney stones.  Describes difficulty with weight loss.  No GYN complaints.    Mammogram 17: Negative    Past Medical History:   Diagnosis Date    Abnormal Pap smear     before , none since    Fatty liver     Hyperlipidemia     Mild vitamin D deficiency        Past Surgical History:   Procedure Laterality Date    breast augmentation       SECTION, LOW TRANSVERSE      CHOLECYSTECTOMY         OB History      Para Term  AB Living    3 2       2    SAB TAB Ectopic Multiple Live Births                         ROS:  GENERAL: Reports difficulty with weight loss.   SKIN: Denies rash or lesions.   HEAD: Denies head injury or headache.   NODES: Denies enlarged lymph nodes.   CHEST: Denies chest pain or shortness of breath.   CARDIOVASCULAR: Denies palpitations or left sided chest pain.   ABDOMEN: No abdominal pain, nausea, vomiting or rectal bleeding.   URINARY: No dysuria or hematuria.  REPRODUCTIVE: See HPI.   BREASTS: Denies pain, lumps, or nipple discharge.   HEMATOLOGIC: No easy bruisability or excessive bleeding.   MUSCULOSKELETAL: Denies joint pain.  NEUROLOGIC: Denies syncope or weakness.   PSYCHIATRIC: Denies depression.     PE:   (chaperone present during entire exam)  APPEARANCE: Well nourished, well developed, in no acute distress.  BREASTS: Symmetrical, no skin changes or visible lesions. No palpable masses, nipple discharge or adenopathy bilaterally.  ABDOMEN: Soft. No tenderness or masses. No hernias. No CVA tenderness.  VULVA: No lesions. Normal female genitalia.  URETHRAL MEATUS: Normal size and location, no lesions, no prolapse.  URETHRA: No masses, tenderness, prolapse or scarring.  VAGINA: No lesions, no discharge, no significant cystocele or rectocele.  CERVIX: No lesions and discharge. PAP  done.  UTERUS: Normal size, regular shape, mobile, non-tender, bladder base nontender.  ADNEXA: No masses, tenderness or CDS nodularity.  ANUS PERINEUM: Normal.    Diagnosis:  1. Well woman exam with routine gynecological exam    2. Postmenopausal status    3. Pap smear for cervical cancer screening          PLAN:    Orders Placed This Encounter    Liquid-based pap smear, screening       Patient was counseled today on postmenopausal issues.      Follow-up in 1 year.

## 2017-12-19 ENCOUNTER — CLINICAL SUPPORT (OUTPATIENT)
Dept: REHABILITATION | Facility: OTHER | Age: 57
End: 2017-12-19
Attending: PHYSICAL MEDICINE & REHABILITATION
Payer: COMMERCIAL

## 2017-12-19 ENCOUNTER — PATIENT MESSAGE (OUTPATIENT)
Dept: OBSTETRICS AND GYNECOLOGY | Facility: CLINIC | Age: 57
End: 2017-12-19

## 2017-12-19 DIAGNOSIS — M54.2 NECK PAIN: ICD-10-CM

## 2017-12-19 NOTE — PROGRESS NOTES
"[facility] Please change in computer. Therapy Treatment    Changed seat position to 379. Please change in computer next session.    Name: Jacek Duran  [facility] Number: [id number]  Date of Treatment: 12/19/2017   Diagnosis:   Encounter Diagnosis   Name Primary?    Neck pain      Physician: Janis Kirby, *    Pain pattern determined: LISA  Plan of care signed: 10/24/17   Time in: 10:00  Time Out: 11:00 am  Total Treatment time: 60  Precautions:  B adhesive capsulitis  Visit #: 8    POC due:1/24/18  Reassessment due: 1/11/2018    Subjective   Jacek reports no pain at this time, states she has been doing the exercises at home and can tell a difference.  She is short on time today and worked a long shift yesterday so "everything" just feels stiff.  Overall, she is better but today is just sore.  No ice and 5 min on treadmill as she needed to shorten visit today.     Patient reports their pain to be 0/10 on a 0-10 scale with 0 being no pain and 10 being the worst pain imaginable.    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   Leisure: Member of [employer] (at least 2x weekly) wants to get to pool                     Pts goals:  Able to push gurneys, lie on her stomach, and strengthen her neck       Objective     Cervical ROM 12/11/17  Flexion Moderate loss   Extesnion Minimal Loss   Side Bending Right Moderate loss   Side bending Left Moderate loss   Rotation Right Minimal loss   Rotation Left Minimal loss        Baseline Isometric Testing on Med X equipment:  Testing administered by PT  Date of testing: 10/24/2017  ROM 90-18 deg   Max Peak Torque 163    Min Peak Torque 121    Flex/Ext Ratio 1.3   % below normative data -29        OUTCOMES:   From Initial Evaluation  FOTO: Focus on Therapeutic Outcomes   Category: cervical   % Impaired: 10/50= 20% limitation   Current Score  = CJ = at least 20% but < 40% " impaired, limited or restricted  Goal  = CI = at least 1% but < 20% impaired, limited or restricted    Score interpretation is as follows:   TEST SCORE  Modifier  Impairment Limitation Restriction    0/50  CH  0 % impaired, limited or restricted   1-9/50  CI  @ least 1% but less than 20% impaired, limited or restricted   10-19/50  CJ  @ least 20%<40% impaired, limited or restricted   20-29/50  CK  @ least 40%<60% impaired, limited or restricted   30-39/50  CL  @ least 60% <80% impaired, limited or restricted   40-49/50  CM  @ least 80%<100% impaired limited or restricted   50/50  CN  100% impaired, limited or restricted       Treatment    Pt was instructed in and performed the following:     Jacek received therapeutic exercises to develop/improved posture, cardiovascular endurance, muscular endurance, lumbar/cervical ROM, strength and muscular endurance for 40 minutes including the following exercises:    [facility] 12/19/2017   Visit Number 8   VAS Pain Rating 0   Treadmill Time (in min.) 5   Speed 3   Retraction in Sitting 10   Scapular Retraction 10   Cervical Weight 126   Repetitions 20   Rating of Perceived Exertion 3   Ice - Z Lie (in min.) 0       Cervical extension with towel 10x  Upper Trap Stretch 10x    Peripheral muscle strengthening which included 1 set of 15-20 repet.hbtheritions at a slow, controlled 7 second per rep pace focused on strengthening supporting musculature for improved body mechanics and functional mobility.  Pt and therapist focused on proper form during treatment to ensure optimal strengthening of each targeted muscle group.  Machines were utilized including torso rotation, leg extension, leg curl, chest press, upright row. Tricep extension, bicep curl, leg press, and hip abduction.       Jacek received the following manual therapy techniques:none      Home Exercise Program as follows:   Scapula retraction  cervical retractions  Handouts were given to the patient. Pt demo good  understanding of the education provided. Jacek demonstrated good return demonstration of activities. Reviewed HEP with patient    Lumbar roll use compliance: Pt utilizes roll in car  Additional exercises taught this treatment session: none, reviewed HEP    Assessment     Pt was able to complete treatment without increase in c/o pain in the neck or shoulders, pt needs to increase cervical ROM, will continue to stretch as tolerated.  Pt was able to increase weight on the Cervical MedX and able to complete 20 repetitions. Good tolerance to exercises and pt felt good while here.   Pt will continue to benefit from skilled outpatient physical therapy to address the deficits stated in the impairment chart, provide pt/family education and to maximize pt's level of independence in the home and community environment.       Pt's spiritual, cultural and educational needs considered and pt agreeable to plan of care and goals as stated below:     Medical necessity is demonstrated by the following IMPAIRMENTS/PROBLEMS:  dical necessity is demonstrated by the following problem list.    Pt presents with the following impairments:   History  Co-morbidities and personal factors that may impact the plan of care Examination  Body Structures and Functions, activity limitations and participation restrictions that may impact the plan of care Clinical Presentation Decision Making/ Complexity Score   Co-morbidities:   History of bilateral shld adhesive capsulitis           Personal Factors:   no deficits Body Regions:   neck  upper extremities     Body Systems:   gross symmetry  ROM  strength  gross coordinated movement     Activity limitations:   Learning and applying knowledge  no deficits     General Tasks and Commands  no deficits     Communication  no deficits     Mobility  lifting and carrying objects  driving (bike, car, motorcycle)     Self care  no deficits     Domestic Life  sleeping     Interactions/Relationships  no  deficits     Life Areas  employment     Community and Social Life  community life  recreation and leisure     Participation Restrictions:  Increased pain with pushing/pulling, forward bending, when working    stable and uncomplicated    Low            Goals:     GOALS: Pt is in agreement with the following goals.     Short term goals: 6 weeks or 10 visits   1.  Pt will demonstrate increased isometric torque by 5% from initial test indicating positive muscular response to program of progressive resistance training  2. Pt will demonstrate reduced pain and improved functional outcomes as reported on the patient centered questionnaires. Report 2-4 points reduction NDI indicating improvement in function and pain  3.. Pt will demonstrate independence with reducing or controlling symptoms with ther ex, movement, or position independently, able to reduce pain 1-2 points on pain scale using strategies taught in therapy  4. Pt will demonstrate increased maximum isometric torque value by 20% when compared to the initial value resulting in improved ability to perform bending, lifting, and carrying activities safely, confidently.           Long term goals: 13 weeks or 20 visits  1. Pt will demonstratte increased cervical ROM as measured by med ex by 6 degrees from initial test which results in full functional ROM of neck for ease with ADLs and driving  2. Pt will demonstrate increased isometric torque by 10% from initial test to improve ability to lift and carry.   3.Patient will demonstrate improved overall function per FOTO Survey to CI = at least 1% but < 20% impaired, limited or restricted score or less.   4. Pt will demonstrate independence with reducing or controlling symptoms with ther ex, movement, or position independently, able to reduce pain 2-4 points on pain scale using strategies taught in therapy  5. Pt will demonstrate independence with the HEP at discharge.        Plan   Continue with established Plan of Care  towards established PT goals.

## 2017-12-27 ENCOUNTER — CLINICAL SUPPORT (OUTPATIENT)
Dept: REHABILITATION | Facility: OTHER | Age: 57
End: 2017-12-27
Attending: PHYSICAL MEDICINE & REHABILITATION
Payer: COMMERCIAL

## 2017-12-27 DIAGNOSIS — M54.2 NECK PAIN: ICD-10-CM

## 2018-01-04 ENCOUNTER — CLINICAL SUPPORT (OUTPATIENT)
Dept: REHABILITATION | Facility: OTHER | Age: 58
End: 2018-01-04
Attending: PHYSICAL MEDICINE & REHABILITATION
Payer: COMMERCIAL

## 2018-01-04 DIAGNOSIS — M54.2 NECK PAIN: ICD-10-CM

## 2018-01-04 PROCEDURE — 97750 PHYSICAL PERFORMANCE TEST: CPT | Mod: 32

## 2018-01-04 NOTE — PROGRESS NOTES
[facility] Please change in computer. Therapy Treatment    Changed seat position to 379. Please change in computer next session.    Name: Jacek Duran  [facility] Number: [id number]  Date of Treatment: 2018   Diagnosis:   Encounter Diagnosis   Name Primary?    Neck pain      Physician: Janis Kirby, *    Pain pattern determined: LISA  Plan of care signed: 10/24/17   Time in: 1:00pm  Time Out:2:00pm  Total Treatment time: 600  Precautions:  B adhesive capsulitis  Visit #: 10    POC due:18  Reassessment due: 2018      Subjective   Jacek reports no pain at this time, stiffness only    Patient reports their pain to be 0/10 on a 0-10 scale with 0 being no pain and 10 being the worst pain imaginable.    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   Leisure: Member of [employer] (at least 2x weekly) wants to get to pool                     Pts goals:  Able to push gurneys, lie on her stomach, and strengthen her neck       Objective     Cervical ROM 17  Flexion Moderate loss   Extesnion Minimal Loss   Side Bending Right Moderate loss   Side bending Left Moderate loss   Rotation Right Minimal loss   Rotation Left Minimal loss        Baseline Isometric Testing on Med X equipment:  Testing administered by PT  Date of testing: 10/24/2017  ROM 90-18 deg   Max Peak Torque 163    Min Peak Torque 121    Flex/Ext Ratio 1.3   % below normative data -29      Mid point:  Testing administered by PT  Date of testin18  -18 deg   Max Peak Torque 246   Min Peak Torque 188   Flex/Ext Ratio 1.3   % below normative data +11        OUTCOMES:   From Initial Evaluation  FOTO: Focus on Therapeutic Outcomes   Category: cervical   % Impaired: 10/50= 20% limitation      18  Current Score  = CJ = at least 20% but < 40% impaired, limited or restricted  Goal  = CI = at least 1% but < 20% impaired,  limited or restricted    Score interpretation is as follows:   TEST SCORE  Modifier  Impairment Limitation Restriction    0/50  CH  0 % impaired, limited or restricted   1-9/50  CI  @ least 1% but less than 20% impaired, limited or restricted   10-19/50  CJ  @ least 20%<40% impaired, limited or restricted   20-29/50  CK  @ least 40%<60% impaired, limited or restricted   30-39/50  CL  @ least 60% <80% impaired, limited or restricted   40-49/50  CM  @ least 80%<100% impaired limited or restricted   50/50  CN  100% impaired, limited or restricted     Neck Disability Index Review 1/4/2018 9/26/2017   Pain Intensity 0 0   Personal Care (Washing, Dressing, etc.) 0 0   Lifting 0 2   Reading 0 1   Headaches 0 0   Concentration 0 1   Work 0 1   Driving 0 1   Sleeping 2 3   Recreation 0 1   Score: 2 10       Treatment    Pt was instructed in and performed the following:     Jacek received therapeutic exercises to develop/improved posture, cardiovascular endurance, muscular endurance, lumbar/cervical ROM, strength and muscular endurance for 40 minutes including the following exercises:  [facility] 1/4/2018   Visit Number 10   VAS Pain Rating 0   Treadmill Time (in min.) 10   Speed 0   Recumbent Bike Seat Pos. -   Time -   Retraction in Sitting 10   Scapular Retraction 10   Cervical Extension Seat Pad -   Seat Adjustment 379   Top Dead Center -   Counterweight -   Cervical Flexion 106   Cervical Extension 18   Cervical Peak Torque 246   Cervical Weight -   Repetitions -   Rating of Perceived Exertion -   Ice - Z Lie (in min.) -         Cervical extension with towel 10x  Upper Trap Stretch 10x    Peripheral muscle strengthening which included 1 set of 15-20 repet.hbtheritions at a slow, controlled 7 second per rep pace focused on strengthening supporting musculature for improved body mechanics and functional mobility.  Pt and therapist focused on proper form during treatment to ensure optimal strengthening of each  targeted muscle group.  Machines were utilized including torso rotation, leg extension, leg curl, chest press, upright row. Tricep extension, bicep curl, leg press, and hip abduction.       Jacek received the following manual therapy techniques:none      Home Exercise Program as follows:   Scapula retraction  cervical retractions  Handouts were given to the patient. Pt demo good understanding of the education provided. Jacek demonstrated good return demonstration of activities. Reviewed HEP with patient    Lumbar roll use compliance: Pt utilizes roll in car  Additional exercises taught this treatment session: none, reviewed HEP    Assessment     Patient has attended 10 visits at [facility] which included MD evaluation, PT evaluation with isometric testing, and physical therapy treatment including HEP instruction, education, aerobic work, dynamic strengthening on med ex equipment for the spine, and whole body strengthening on med ex equipment with increasing weight loads.  Patient  is demonstrating increased ability to reduce symptoms, improved posture, improved   ROM, and improved   strength on med ex test by  57%  Average.  Cervical ROM increased from 90 to 108 for flexion.  Pt had made significant improvements with [facility]    Pt will continue to benefit from skilled outpatient physical therapy to address the deficits stated in the impairment chart, provide pt/family education and to maximize pt's level of independence in the home and community environment.       Pt's spiritual, cultural and educational needs considered and pt agreeable to plan of care and goals as stated below:     Medical necessity is demonstrated by the following IMPAIRMENTS/PROBLEMS:  dical necessity is demonstrated by the following problem list.    Pt presents with the following impairments:   History  Co-morbidities and personal factors that may impact the plan of care Examination  Body Structures and Functions, activity limitations and  participation restrictions that may impact the plan of care Clinical Presentation Decision Making/ Complexity Score   Co-morbidities:   History of bilateral shld adhesive capsulitis           Personal Factors:   no deficits Body Regions:   neck  upper extremities     Body Systems:   gross symmetry  ROM  strength  gross coordinated movement     Activity limitations:   Learning and applying knowledge  no deficits     General Tasks and Commands  no deficits     Communication  no deficits     Mobility  lifting and carrying objects  driving (bike, car, motorcycle)     Self care  no deficits     Domestic Life  sleeping     Interactions/Relationships  no deficits     Life Areas  employment     Community and Social Life  community life  recreation and leisure     Participation Restrictions:  Increased pain with pushing/pulling, forward bending, when working    stable and uncomplicated    Low                GOALS: Pt is in agreement with the following goals.     Short term goals: 6 weeks or 10 visits   1.  Pt will demonstrate increased isometric torque by 5% from initial test indicating positive muscular response to program of progressive resistance training MET  2. Pt will demonstrate reduced pain and improved functional outcomes as reported on the patient centered questionnaires. Report 2-4 points reduction NDI indicating improvement in function and pain  MET  3.. Pt will demonstrate independence with reducing or controlling symptoms with ther ex, movement, or position independently, able to reduce pain 1-2 points on pain scale using strategies taught in therapy MET  4. Pt will demonstrate increased maximum isometric torque value by 20% when compared to the initial value resulting in improved ability to perform bending, lifting, and carrying activities safely, confidently. MET           Long term goals: 13 weeks or 20 visits  1. Pt will demonstratte increased cervical ROM as measured by med ex by 6 degrees from initial  test which results in full functional ROM of neck for ease with ADLs and driving MET  2. Pt will demonstrate increased isometric torque by 10% from initial test to improve ability to lift and carry. MET  3.Patient will demonstrate improved overall function per FOTO Survey to CI = at least 1% but < 20% impaired, limited or restricted score or less. MET  4. Pt will demonstrate independence with reducing or controlling symptoms with ther ex, movement, or position independently, able to reduce pain 2-4 points on pain scale using strategies taught in therapy  5. Pt will demonstrate independence with the HEP at discharge.  MET       Plan   Continue with established Plan of Care towards established PT goals.

## 2018-01-09 ENCOUNTER — CLINICAL SUPPORT (OUTPATIENT)
Dept: REHABILITATION | Facility: OTHER | Age: 58
End: 2018-01-09
Attending: PHYSICAL MEDICINE & REHABILITATION
Payer: COMMERCIAL

## 2018-01-09 DIAGNOSIS — M54.2 NECK PAIN: ICD-10-CM

## 2018-01-09 NOTE — PROGRESS NOTES
[facility] Please change in computer. Therapy Treatment    Changed seat position to 379. Please change in computer next session.    Name: Jacek Duran  [facility] Number: [id number]  Date of Treatment: 2018   Diagnosis:   Encounter Diagnosis   Name Primary?    Neck pain      Physician: Janis Kirby, *    Pain pattern determined: LISA  Plan of care signed: 10/24/17   Time in: 11:00pm  Time Out:12:00pm  Total Treatment time: 600  Precautions:  B adhesive capsulitis  Visit #: 11    POC due:18  Reassessment due: 2018, done 18      Subjective   Jacek reports increased soreness at right cervical area after the Mid Point Testing, laying in bed with sick grandchild and doing abdominal work at the gym which all increased pain.     Patient reports their pain to be 0/10 on a 0-10 scale with 0 being no pain and 10 being the worst pain imaginable.    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   Leisure: Member of [employer] (at least 2x weekly) wants to get to pool                     Pts goals:  Able to push gurneys, lie on her stomach, and strengthen her neck       Objective     Cervical ROM 17  Flexion Moderate loss   Extesnion Minimal Loss   Side Bending Right Moderate loss   Side bending Left Moderate loss   Rotation Right Minimal loss   Rotation Left Minimal loss        Baseline Isometric Testing on Med X equipment:  Testing administered by PT  Date of testing: 10/24/2017  ROM 90-18 deg   Max Peak Torque 163    Min Peak Torque 121    Flex/Ext Ratio 1.3   % below normative data -29      Mid point:  Testing administered by PT  Date of testin18  -18 deg   Max Peak Torque 246   Min Peak Torque 188   Flex/Ext Ratio 1.3   % below normative data +11        OUTCOMES:   From Initial Evaluation  FOTO: Focus on Therapeutic Outcomes   Category: cervical   % Impaired: 10/50= 20% limitation       1/4/18 2/50  Current Score  = CJ = at least 20% but < 40% impaired, limited or restricted  Goal  = CI = at least 1% but < 20% impaired, limited or restricted    Score interpretation is as follows:   TEST SCORE  Modifier  Impairment Limitation Restriction    0/50  CH  0 % impaired, limited or restricted   1-9/50  CI  @ least 1% but less than 20% impaired, limited or restricted   10-19/50  CJ  @ least 20%<40% impaired, limited or restricted   20-29/50  CK  @ least 40%<60% impaired, limited or restricted   30-39/50  CL  @ least 60% <80% impaired, limited or restricted   40-49/50  CM  @ least 80%<100% impaired limited or restricted   50/50  CN  100% impaired, limited or restricted     Neck Disability Index Review 1/4/2018 9/26/2017   Pain Intensity 0 0   Personal Care (Washing, Dressing, etc.) 0 0   Lifting 0 2   Reading 0 1   Headaches 0 0   Concentration 0 1   Work 0 1   Driving 0 1   Sleeping 2 3   Recreation 0 1   Score: 2 10       Treatment    Pt was instructed in and performed the following:     Jacek received therapeutic exercises to develop/improved posture, cardiovascular endurance, muscular endurance, lumbar/cervical ROM, strength and muscular endurance for 40 minutes including the following exercise:    [facility] 1/9/2018   Visit Number 11   VAS Pain Rating 0   Treadmill Time (in min.) 10   Speed 0   Retraction in Sitting 10   Scapular Retraction 10   Cervical Weight 132   Repetitions 20   Rating of Perceived Exertion 3   Ice - Z Lie (in min.) 0       Cervical extension with towel 10x  Upper Trap Stretch 10x    Peripheral muscle strengthening which included 1 set of 15-20 repet.hbtheritions at a slow, controlled 7 second per rep pace focused on strengthening supporting musculature for improved body mechanics and functional mobility.  Pt and therapist focused on proper form during treatment to ensure optimal strengthening of each targeted muscle group.  Machines were utilized including torso  rotation, leg extension, leg curl, chest press, upright row. Tricep extension, bicep curl, leg press, and hip abduction.       Jacek received the following manual therapy techniques:none      Home Exercise Program as follows:   Scapula retraction  cervical retractions  Handouts were given to the patient. Pt demo good understanding of the education provided. Jacek demonstrated good return demonstration of activities. Reviewed HEP with patient    Lumbar roll use compliance: Pt utilizes roll in car  Additional exercises taught this treatment session: none, reviewed HEP    Assessment       Pt with moderate stiffness and tightness on right side today after Mid Point testing, sleeping with grandchild and performing abdominal exercises which all may have aggravated her symptoms. Trial of STM, manual therapy and stretching helped some but initially extremely painful to light touch along SCM area right.  Better after session, but still sore on right.  Pt will continue to benefit from skilled outpatient physical therapy to address the deficits stated in the impairment chart, provide pt/family education and to maximize pt's level of independence in the home and community environment.   Pt's spiritual, cultural and educational needs considered and pt agreeable to plan of care and goals as stated below:     Medical necessity is demonstrated by the following IMPAIRMENTS/PROBLEMS:  dical necessity is demonstrated by the following problem list.    Pt presents with the following impairments:   History  Co-morbidities and personal factors that may impact the plan of care Examination  Body Structures and Functions, activity limitations and participation restrictions that may impact the plan of care Clinical Presentation Decision Making/ Complexity Score   Co-morbidities:   History of bilateral shld adhesive capsulitis           Personal Factors:   no deficits Body Regions:   neck  upper extremities     Body Systems:   gross  symmetry  ROM  strength  gross coordinated movement     Activity limitations:   Learning and applying knowledge  no deficits     General Tasks and Commands  no deficits     Communication  no deficits     Mobility  lifting and carrying objects  driving (bike, car, motorcycle)     Self care  no deficits     Domestic Life  sleeping     Interactions/Relationships  no deficits     Life Areas  employment     Community and Social Life  community life  recreation and leisure     Participation Restrictions:  Increased pain with pushing/pulling, forward bending, when working    stable and uncomplicated    Low                GOALS: Pt is in agreement with the following goals.     Short term goals: 6 weeks or 10 visits   1.  Pt will demonstrate increased isometric torque by 5% from initial test indicating positive muscular response to program of progressive resistance training MET  2. Pt will demonstrate reduced pain and improved functional outcomes as reported on the patient centered questionnaires. Report 2-4 points reduction NDI indicating improvement in function and pain  MET  3.. Pt will demonstrate independence with reducing or controlling symptoms with ther ex, movement, or position independently, able to reduce pain 1-2 points on pain scale using strategies taught in therapy MET  4. Pt will demonstrate increased maximum isometric torque value by 20% when compared to the initial value resulting in improved ability to perform bending, lifting, and carrying activities safely, confidently. MET           Long term goals: 13 weeks or 20 visits  1. Pt will demonstratte increased cervical ROM as measured by med ex by 6 degrees from initial test which results in full functional ROM of neck for ease with ADLs and driving MET  2. Pt will demonstrate increased isometric torque by 10% from initial test to improve ability to lift and carry. MET  3.Patient will demonstrate improved overall function per FOTO Survey to CI = at least 1%  but < 20% impaired, limited or restricted score or less. MET  4. Pt will demonstrate independence with reducing or controlling symptoms with ther ex, movement, or position independently, able to reduce pain 2-4 points on pain scale using strategies taught in therapy  5. Pt will demonstrate independence with the HEP at discharge.  MET       Plan   Continue with established Plan of Care towards established PT goals.

## 2018-01-25 ENCOUNTER — CLINICAL SUPPORT (OUTPATIENT)
Dept: REHABILITATION | Facility: OTHER | Age: 58
End: 2018-01-25
Attending: PHYSICAL MEDICINE & REHABILITATION
Payer: COMMERCIAL

## 2018-01-25 DIAGNOSIS — M54.2 NECK PAIN: ICD-10-CM

## 2018-01-25 NOTE — PROGRESS NOTES
[facility] Please change in computer. Therapy Treatment        Name: Jacek PALACIOS [facility] Number: [id number]  Date of Treatment: 2018   Diagnosis:   Encounter Diagnosis   Name Primary?    Neck pain      Physician: Janis Kirby, *    Pain pattern determined: LISA  Plan of care signed: 10/24/17   Time in: 10:30pm  Time Out:11:45pm  Total Treatment time: 75  Precautions:  B adhesive capsulitis  Visit #: 12    POC due:18  Reassessment due: 2018, done 18      Subjective   Jacek reports improved symptoms compared to last session. She continued to report right sided tenderness and stiffness. Pt reports abolished symptoms following manual therapy. Pt reports increased pain after performing abdominal workout (hanging abdominal curls). Pt likes exercise to work out core but finds it hurts neck last session. Pt liked core exercises taught today.     Patient reports their pain to be 2/10 on a 0-10 scale with 0 being no pain and 10 being the worst pain imaginable.    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   Leisure: Member of [employer] (at least 2x weekly) wants to get to pool                     Pts goals:  Able to push gurneys, lie on her stomach, and strengthen her neck       Objective     Cervical ROM 17  Flexion Mild loss   Extesnion Minimal Loss   Side Bending Right Moderate loss   Side bending Left Moderate loss   Rotation Right Minimal loss   Rotation Left Minimal loss        Baseline Isometric Testing on Med X equipment:  Testing administered by PT  Date of testing: 10/24/2017  ROM 90-18 deg   Max Peak Torque 163    Min Peak Torque 121    Flex/Ext Ratio 1.3   % below normative data -29      Mid point:  Testing administered by PT  Date of testin18  -18 deg   Max Peak Torque 246   Min Peak Torque 188   Flex/Ext Ratio 1.3   % below normative data +11        OUTCOMES:    From Initial Evaluation  FOTO: Focus on Therapeutic Outcomes   Category: cervical   % Impaired: 10/50= 20% limitation      1/4/18 2/50  Current Score  = CJ = at least 20% but < 40% impaired, limited or restricted  Goal  = CI = at least 1% but < 20% impaired, limited or restricted    Score interpretation is as follows:   TEST SCORE  Modifier  Impairment Limitation Restriction    0/50  CH  0 % impaired, limited or restricted   1-9/50  CI  @ least 1% but less than 20% impaired, limited or restricted   10-19/50  CJ  @ least 20%<40% impaired, limited or restricted   20-29/50  CK  @ least 40%<60% impaired, limited or restricted   30-39/50  CL  @ least 60% <80% impaired, limited or restricted   40-49/50  CM  @ least 80%<100% impaired limited or restricted   50/50  CN  100% impaired, limited or restricted     Neck Disability Index Review 1/4/2018 9/26/2017   Pain Intensity 0 0   Personal Care (Washing, Dressing, etc.) 0 0   Lifting 0 2   Reading 0 1   Headaches 0 0   Concentration 0 1   Work 0 1   Driving 0 1   Sleeping 2 3   Recreation 0 1   Score: 2 10       Treatment    Pt was instructed in and performed the following:     Jacek received therapeutic exercises to develop/improved posture, cardiovascular endurance, muscular endurance, lumbar/cervical ROM, strength and muscular endurance for 40 minutes including the following exercise:  [facility] 1/25/2018   Visit Number 12   VAS Pain Rating 0   Treadmill Time (in min.) 10   Speed 0   Recumbent Bike Seat Pos. -   Time -   Retraction in Sitting 10   Retraction with Extension 3   Sidebending 10   Scapular Retraction 10   Manual Therapy 10   Cervical Extension Seat Pad -   Seat Adjustment -   Top Dead Center -   Counterweight -   Cervical Flexion -   Cervical Extension -   Cervical Peak Torque -   Cervical Weight 138   Repetitions 20   Rating of Perceived Exertion 3   Ice - Z Lie (in min.) 5       Cervical extension with towel 10x  Upper Trap Stretch 10x  (Attempted with contract/relax technique with improved ROM, reduced pain)   PPT 10x  PPT +SLR 10x     Peripheral muscle strengthening which included 1 set of 15-20 repet.hbtheritions at a slow, controlled 7 second per rep pace focused on strengthening supporting musculature for improved body mechanics and functional mobility.  Pt and therapist focused on proper form during treatment to ensure optimal strengthening of each targeted muscle group.  Machines were utilized including torso rotation, leg extension, leg curl, chest press, upright row. Tricep extension, bicep curl, leg press, and hip abduction.       Jacek received the following manual therapy techniques:none      Home Exercise Program as follows:   Scapula retraction 10x, 3x daily   cervical retractions 10x, 3x daily   Supine pelvic tilts + SLR 5-10 s/h 10x, 2x daily   PNF 5 sec contract, 10 sec stretch cervical sidebending 10x, 1-2x daily    Handouts were given to the patient. Pt demo good understanding of the education provided. Jacek demonstrated good return demonstration of activities. Reviewed HEP with patient    Lumbar roll use compliance: Pt utilizes roll in car  Additional exercises taught this treatment session:    Supine pelvic tilts + SLR 5-10 s/h 10x, 2x daily   PNF 5 sec contract, 10 sec stretch cervical sidebending 10x, 1-2x daily      Assessment       Pt with moderate stiffness and tightness on right side today but improved compared to previous sessions. Trial of STM, manual therapy and stretching helped with overall abolished symptoms. Pt demonstrates increased right paraspinal and right C3- facet hypomobility which improved moderately with manual treatment. Added PNF sidebending with significant reduction in pain symptoms and improved ROM. Pt also taught supine pelvic tilts and SLR per pt's reports of increased pain when performing hanging abdominal core work. Pt reported good abdominal contraction and will substitute for this exercise to  reduce neck irritation.   .  Pt will continue to benefit from skilled outpatient physical therapy to address the deficits stated in the impairment chart, provide pt/family education and to maximize pt's level of independence in the home and community environment.   Pt's spiritual, cultural and educational needs considered and pt agreeable to plan of care and goals as stated below:     Medical necessity is demonstrated by the following IMPAIRMENTS/PROBLEMS:  dical necessity is demonstrated by the following problem list.    Pt presents with the following impairments:   History  Co-morbidities and personal factors that may impact the plan of care Examination  Body Structures and Functions, activity limitations and participation restrictions that may impact the plan of care Clinical Presentation Decision Making/ Complexity Score   Co-morbidities:   History of bilateral shld adhesive capsulitis           Personal Factors:   no deficits Body Regions:   neck  upper extremities     Body Systems:   gross symmetry  ROM  strength  gross coordinated movement     Activity limitations:   Learning and applying knowledge  no deficits     General Tasks and Commands  no deficits     Communication  no deficits     Mobility  lifting and carrying objects  driving (bike, car, motorcycle)     Self care  no deficits     Domestic Life  sleeping     Interactions/Relationships  no deficits     Life Areas  employment     Community and Social Life  community life  recreation and leisure     Participation Restrictions:  Increased pain with pushing/pulling, forward bending, when working    stable and uncomplicated    Low                GOALS: Pt is in agreement with the following goals.     Short term goals: 6 weeks or 10 visits   1.  Pt will demonstrate increased isometric torque by 5% from initial test indicating positive muscular response to program of progressive resistance training MET  2. Pt will demonstrate reduced pain and improved  functional outcomes as reported on the patient centered questionnaires. Report 2-4 points reduction NDI indicating improvement in function and pain  MET  3.. Pt will demonstrate independence with reducing or controlling symptoms with ther ex, movement, or position independently, able to reduce pain 1-2 points on pain scale using strategies taught in therapy MET  4. Pt will demonstrate increased maximum isometric torque value by 20% when compared to the initial value resulting in improved ability to perform bending, lifting, and carrying activities safely, confidently. MET           Long term goals: 13 weeks or 20 visits  1. Pt will demonstratte increased cervical ROM as measured by med ex by 6 degrees from initial test which results in full functional ROM of neck for ease with ADLs and driving MET  2. Pt will demonstrate increased isometric torque by 10% from initial test to improve ability to lift and carry. MET  3.Patient will demonstrate improved overall function per FOTO Survey to CI = at least 1% but < 20% impaired, limited or restricted score or less. MET  4. Pt will demonstrate independence with reducing or controlling symptoms with ther ex, movement, or position independently, able to reduce pain 2-4 points on pain scale using strategies taught in therapy  5. Pt will demonstrate independence with the HEP at discharge.  MET       Plan   Continue with established Plan of Care towards established PT goals.

## 2018-05-04 ENCOUNTER — OFFICE VISIT (OUTPATIENT)
Dept: UROLOGY | Facility: CLINIC | Age: 58
End: 2018-05-04
Payer: COMMERCIAL

## 2018-05-04 VITALS — BODY MASS INDEX: 24.11 KG/M2 | WEIGHT: 150 LBS | HEIGHT: 66 IN

## 2018-05-04 DIAGNOSIS — R31.0 GROSS HEMATURIA: Primary | ICD-10-CM

## 2018-05-04 PROCEDURE — 88112 CYTOPATH CELL ENHANCE TECH: CPT | Performed by: PATHOLOGY

## 2018-05-04 PROCEDURE — 99499 UNLISTED E&M SERVICE: CPT | Mod: S$GLB,,, | Performed by: UROLOGY

## 2018-05-04 PROCEDURE — 88112 CYTOPATH CELL ENHANCE TECH: CPT | Mod: 26,,, | Performed by: PATHOLOGY

## 2018-05-04 PROCEDURE — 87086 URINE CULTURE/COLONY COUNT: CPT

## 2018-05-04 PROCEDURE — 99999 PR PBB SHADOW E&M-EST. PATIENT-LVL III: CPT | Mod: PBBFAC,,,

## 2018-05-04 RX ORDER — NITROFURANTOIN 25; 75 MG/1; MG/1
100 CAPSULE ORAL 2 TIMES DAILY
Qty: 6 CAPSULE | Refills: 0 | Status: SHIPPED | OUTPATIENT
Start: 2018-05-04 | End: 2018-05-07

## 2018-05-04 NOTE — PROGRESS NOTES
Urology - [facility]  [facility]  Staff - Dr. Jauregui    SUBJECTIVE:     Chief Complaint: gross hematuria     History of Present Illness:  Jacek Duran is a 57 y.o. female with episode of gross hematuria. Began yesterday. No frequency, urgency, dysuria. No flank pain.   She has a hx of a left kidney stone which she passed in 2016.     She has never smoked. No family hx of  malignancy. Works as a nurse in [employer].     Review of patient's allergies indicates:  No Known Allergies    Past Medical History:   Diagnosis Date    Abnormal Pap smear     before , none since    Fatty liver     Hyperlipidemia     Mild vitamin D deficiency      Past Surgical History:   Procedure Laterality Date    breast augmentation       SECTION, LOW TRANSVERSE      CHOLECYSTECTOMY       Family History   Problem Relation Age of Onset    Kidney disease Mother         brought on by medication    Diabetes Mother     Cataracts Mother     Hypertension Mother     Hyperlipidemia Mother     Heart disease Father         CABG x 3 in 70s; pacemaker placed at 96    Cataracts Father     Colon cancer Paternal Uncle         in 70s-80s    Heart disease Paternal Uncle         CABG in 60s-70s - all uncles.    Stroke Maternal Grandmother     Amblyopia Neg Hx     Blindness Neg Hx     Glaucoma Neg Hx     Macular degeneration Neg Hx     Retinal detachment Neg Hx     Strabismus Neg Hx     Thyroid disease Neg Hx     Breast cancer Neg Hx     Ovarian cancer Neg Hx     Cancer Neg Hx     Melanoma Neg Hx      Social History   Substance Use Topics    Smoking status: Never Smoker    Smokeless tobacco: Never Used    Alcohol use Yes      Comment: twice a month        Review of Systems   Constitutional: Negative for chills and fever.   HENT: Negative for trouble swallowing.    Eyes: Negative for pain.   Respiratory: Negative for cough and shortness of breath.    Cardiovascular: Negative for chest pain and  "palpitations.   Gastrointestinal: Negative for abdominal pain, nausea and vomiting.   Genitourinary: Positive for hematuria. Negative for difficulty urinating, dysuria, flank pain, frequency and urgency.   Neurological: Negative for weakness.   Psychiatric/Behavioral: Negative for behavioral problems.       OBJECTIVE:     Anticoagulation:  No    Estimated body mass index is 24.21 kg/m² as calculated from the following:    Height as of this encounter: 5' 6" (1.676 m).    Weight as of this encounter: 68 kg (150 lb).    Vital Signs (Most Recent)       Physical Exam   Constitutional: She is oriented to person, place, and time and well-developed, well-nourished, and in no distress. No distress.   HENT:   Head: Normocephalic and atraumatic.   Eyes: No scleral icterus.   Neck: No JVD present.   Cardiovascular: Normal rate and regular rhythm.    Pulmonary/Chest: Effort normal. No respiratory distress.   Abdominal: Soft. She exhibits no distension. There is no tenderness. There is no rebound and no guarding.   Neurological: She is alert and oriented to person, place, and time.   Skin: She is not diaphoretic. No pallor.   Psychiatric: Memory, affect and judgment normal.         BMP  Lab Results   Component Value Date     03/14/2017    K 3.6 03/14/2017     03/14/2017    CO2 24 03/14/2017    BUN 14 03/14/2017    CREATININE 0.8 03/14/2017    CALCIUM 9.2 03/14/2017    ANIONGAP 11 03/14/2017    ESTGFRAFRICA >60.0 03/14/2017    EGFRNONAA >60.0 03/14/2017       Lab Results   Component Value Date    WBC 6.19 03/14/2017    HGB 12.1 03/14/2017    HCT 37.2 03/14/2017    MCV 88 03/14/2017     03/14/2017       Imaging: no recent  imaging    UA today +blood and trace protein, otherwise negative     ASSESSMENT/PLAN:     Jacek was seen today for hematuria.    Diagnoses and all orders for this visit:    Gross hematuria  -     Urine culture  -     Cytology, urine  -     CT Urogram Abd Pelvis W WO; Future  -     Cystoscopy; " Future    Other orders  -     nitrofurantoin, macrocrystal-monohydrate, (MACROBID) 100 MG capsule; Take 1 capsule (100 mg total) by mouth 2 (two) times daily.      Will set up for hematuria workup  CT Urogram  Cystoscopy  Urine cytology  Urine sent for culture  3 days of empiric macrobid given    Sameera Pak MD

## 2018-05-05 LAB — BACTERIA UR CULT: NO GROWTH

## 2018-05-29 ENCOUNTER — TELEPHONE (OUTPATIENT)
Dept: UROLOGY | Facility: CLINIC | Age: 58
End: 2018-05-29

## 2018-05-29 NOTE — TELEPHONE ENCOUNTER
----- Message from Salena Zendejas sent at 5/29/2018  8:12 AM CDT -----  Contact: pt#[phone]  Pt is calling to reschedule cysto. Please call

## 2018-06-04 ENCOUNTER — HOSPITAL ENCOUNTER (OUTPATIENT)
Dept: RADIOLOGY | Facility: HOSPITAL | Age: 58
Discharge: HOME | End: 2018-06-04
Attending: STUDENT IN AN ORGANIZED HEALTH CARE EDUCATION/TRAINING PROGRAM
Payer: COMMERCIAL

## 2018-06-04 DIAGNOSIS — R31.0 GROSS HEMATURIA: ICD-10-CM

## 2018-06-04 PROCEDURE — 74178 CT ABD&PLV WO CNTR FLWD CNTR: CPT | Mod: TC

## 2018-06-04 PROCEDURE — 25500020 PHARM REV CODE 255: Performed by: STUDENT IN AN ORGANIZED HEALTH CARE EDUCATION/TRAINING PROGRAM

## 2018-06-04 PROCEDURE — 74178 CT ABD&PLV WO CNTR FLWD CNTR: CPT | Mod: 26,,, | Performed by: RADIOLOGY

## 2018-06-04 RX ADMIN — IOHEXOL 125 ML: 350 INJECTION, SOLUTION INTRAVENOUS at 07:06

## 2018-06-05 ENCOUNTER — TELEPHONE (OUTPATIENT)
Dept: UROLOGY | Facility: CLINIC | Age: 58
End: 2018-06-05

## 2018-06-05 DIAGNOSIS — N20.1 LEFT URETERAL STONE: Primary | ICD-10-CM

## 2018-06-05 DIAGNOSIS — N20.1 LEFT URETERAL CALCULUS: Primary | ICD-10-CM

## 2018-06-05 RX ORDER — TAMSULOSIN HYDROCHLORIDE 0.4 MG/1
0.4 CAPSULE ORAL DAILY
Qty: 30 CAPSULE | Refills: 0 | Status: SHIPPED | OUTPATIENT
Start: 2018-06-05 | End: 2019-01-07

## 2018-06-05 NOTE — TELEPHONE ENCOUNTER
----- Message from Jasbir Jauregui Jr., MD sent at 6/5/2018 12:22 PM CDT -----  4 mm left lower third ureteral stone  Needs kub and appt

## 2018-06-07 ENCOUNTER — HOSPITAL ENCOUNTER (OUTPATIENT)
Dept: RADIOLOGY | Facility: HOSPITAL | Age: 58
Discharge: HOME | End: 2018-06-07
Attending: UROLOGY
Payer: COMMERCIAL

## 2018-06-07 ENCOUNTER — PATIENT MESSAGE (OUTPATIENT)
Dept: INTERNAL MEDICINE | Facility: CLINIC | Age: 58
End: 2018-06-07

## 2018-06-07 DIAGNOSIS — R91.1 LUNG NODULE: Primary | ICD-10-CM

## 2018-06-07 DIAGNOSIS — N20.1 LEFT URETERAL CALCULUS: ICD-10-CM

## 2018-06-07 DIAGNOSIS — N20.1 LEFT URETERAL CALCULUS: Primary | ICD-10-CM

## 2018-06-07 PROCEDURE — 74018 RADEX ABDOMEN 1 VIEW: CPT | Mod: 26,,, | Performed by: RADIOLOGY

## 2018-06-07 PROCEDURE — 74018 RADEX ABDOMEN 1 VIEW: CPT | Mod: TC

## 2018-06-07 RX ORDER — HYDROCODONE BITARTRATE AND ACETAMINOPHEN 7.5; 325 MG/1; MG/1
1 TABLET ORAL EVERY 6 HOURS PRN
Qty: 20 TABLET | Refills: 0 | Status: SHIPPED | OUTPATIENT
Start: 2018-06-07 | End: 2019-01-07

## 2018-06-08 ENCOUNTER — TELEPHONE (OUTPATIENT)
Dept: INTERNAL MEDICINE | Facility: CLINIC | Age: 58
End: 2018-06-08

## 2018-06-12 ENCOUNTER — PATIENT MESSAGE (OUTPATIENT)
Dept: INTERNAL MEDICINE | Facility: CLINIC | Age: 58
End: 2018-06-12

## 2018-06-12 ENCOUNTER — HOSPITAL ENCOUNTER (OUTPATIENT)
Dept: RADIOLOGY | Facility: HOSPITAL | Age: 58
Discharge: HOME | End: 2018-06-12
Attending: INTERNAL MEDICINE
Payer: COMMERCIAL

## 2018-06-12 DIAGNOSIS — R91.1 LUNG NODULE: ICD-10-CM

## 2018-06-12 PROCEDURE — 71250 CT THORAX DX C-: CPT | Mod: TC

## 2018-06-12 PROCEDURE — 71250 CT THORAX DX C-: CPT | Mod: 26,,, | Performed by: RADIOLOGY

## 2018-06-18 ENCOUNTER — HOSPITAL ENCOUNTER (OUTPATIENT)
Dept: RADIOLOGY | Facility: HOSPITAL | Age: 58
Discharge: HOME | End: 2018-06-18
Attending: UROLOGY
Payer: COMMERCIAL

## 2018-06-18 DIAGNOSIS — N20.1 LEFT URETERAL CALCULUS: ICD-10-CM

## 2018-06-18 PROCEDURE — 74018 RADEX ABDOMEN 1 VIEW: CPT | Mod: 26,,, | Performed by: RADIOLOGY

## 2018-06-18 PROCEDURE — 74018 RADEX ABDOMEN 1 VIEW: CPT | Mod: TC

## 2018-07-02 DIAGNOSIS — N20.1 LEFT URETERAL STONE: ICD-10-CM

## 2018-07-02 RX ORDER — TAMSULOSIN HYDROCHLORIDE 0.4 MG/1
0.4 CAPSULE ORAL DAILY
Qty: 30 CAPSULE | Refills: 0 | OUTPATIENT
Start: 2018-07-02 | End: 2018-08-01

## 2018-07-03 ENCOUNTER — DOCUMENTATION ONLY (OUTPATIENT)
Dept: REHABILITATION | Facility: OTHER | Age: 58
End: 2018-07-03

## 2018-07-03 NOTE — PROGRESS NOTES
DC NOTE FOR [facility]    Pt was treated 12 times from 10/24/17 to 1/25/17.  Treatments consisted of stretching and strengthening exercises for the cervical spine.  Goals of PT partially met.  Pt did not return for further follow up.  DC from [facility] PT as pt self discharged.

## 2018-07-23 DIAGNOSIS — N20.1 LEFT URETERAL STONE: ICD-10-CM

## 2018-07-23 RX ORDER — TAMSULOSIN HYDROCHLORIDE 0.4 MG/1
0.4 CAPSULE ORAL DAILY
Qty: 30 CAPSULE | Refills: 0 | OUTPATIENT
Start: 2018-07-23 | End: 2018-08-22

## 2018-08-13 ENCOUNTER — TELEPHONE (OUTPATIENT)
Dept: DERMATOLOGY | Facility: CLINIC | Age: 58
End: 2018-08-13

## 2018-08-13 NOTE — TELEPHONE ENCOUNTER
----- Message from Haley Bautista sent at 8/13/2018  2:55 PM CDT -----  Contact: pt at [phone]  Jaylene pt-Pt has question about  Chemical peel and if it will be covered. If not how much will it be.  Lesions are on her face.  Will also need a mole check.  Can both be done at the same appt.

## 2018-08-21 ENCOUNTER — TELEPHONE (OUTPATIENT)
Dept: DERMATOLOGY | Facility: CLINIC | Age: 58
End: 2018-08-21

## 2018-08-21 NOTE — TELEPHONE ENCOUNTER
----- Message from Haley Michele sent at 8/21/2018 10:46 AM CDT -----  Contact: pt at [phone]  Jaylene cross-_pt  cannot keep  her appt on Sept 6 and needs to reschedule sooner than November.  Has to work that day.

## 2018-11-13 ENCOUNTER — PATIENT MESSAGE (OUTPATIENT)
Dept: OTOLARYNGOLOGY | Facility: CLINIC | Age: 58
End: 2018-11-13

## 2018-11-13 DIAGNOSIS — H53.9 VISION DISORDER: Primary | ICD-10-CM

## 2018-11-16 RX ORDER — ACYCLOVIR 400 MG/1
400 TABLET ORAL 3 TIMES DAILY
Qty: 21 TABLET | Refills: 0 | Status: SHIPPED | OUTPATIENT
Start: 2018-11-16 | End: 2022-04-13 | Stop reason: SDUPTHER

## 2018-11-16 NOTE — TELEPHONE ENCOUNTER
One refill done.  Patient has not been seen in over a year.  No further refills can be done unless patient has an appointment.

## 2018-11-20 ENCOUNTER — CLINICAL SUPPORT (OUTPATIENT)
Dept: OPHTHALMOLOGY | Facility: CLINIC | Age: 58
End: 2018-11-20
Payer: COMMERCIAL

## 2018-11-20 DIAGNOSIS — H53.9 VISION DISORDER: ICD-10-CM

## 2018-11-28 ENCOUNTER — PATIENT MESSAGE (OUTPATIENT)
Dept: OTOLARYNGOLOGY | Facility: CLINIC | Age: 58
End: 2018-11-28

## 2018-12-03 ENCOUNTER — TELEPHONE (OUTPATIENT)
Dept: OTOLARYNGOLOGY | Facility: CLINIC | Age: 58
End: 2018-12-03

## 2018-12-20 DIAGNOSIS — Z00.00 ANNUAL PHYSICAL EXAM: Primary | ICD-10-CM

## 2018-12-21 ENCOUNTER — LAB VISIT (OUTPATIENT)
Dept: LAB | Facility: HOSPITAL | Age: 58
End: 2018-12-21
Payer: COMMERCIAL

## 2018-12-21 DIAGNOSIS — Z00.00 ANNUAL PHYSICAL EXAM: ICD-10-CM

## 2018-12-21 LAB
ALBUMIN SERPL BCP-MCNC: 4.1 G/DL
ALP SERPL-CCNC: 128 U/L
ALT SERPL W/O P-5'-P-CCNC: 70 U/L
ANION GAP SERPL CALC-SCNC: 8 MMOL/L
AST SERPL-CCNC: 35 U/L
BASOPHILS # BLD AUTO: 0.03 K/UL
BASOPHILS NFR BLD: 0.5 %
BILIRUB SERPL-MCNC: 0.4 MG/DL
BUN SERPL-MCNC: 19 MG/DL
CALCIUM SERPL-MCNC: 9.5 MG/DL
CHLORIDE SERPL-SCNC: 106 MMOL/L
CHOLEST SERPL-MCNC: 274 MG/DL
CHOLEST/HDLC SERPL: 3.1 {RATIO}
CO2 SERPL-SCNC: 26 MMOL/L
CREAT SERPL-MCNC: 0.8 MG/DL
DIFFERENTIAL METHOD: ABNORMAL
EOSINOPHIL # BLD AUTO: 0.2 K/UL
EOSINOPHIL NFR BLD: 2.9 %
ERYTHROCYTE [DISTWIDTH] IN BLOOD BY AUTOMATED COUNT: 11.9 %
EST. GFR  (AFRICAN AMERICAN): >60 ML/MIN/1.73 M^2
EST. GFR  (NON AFRICAN AMERICAN): >60 ML/MIN/1.73 M^2
ESTIMATED AVG GLUCOSE: 103 MG/DL
GLUCOSE SERPL-MCNC: 100 MG/DL
HBA1C MFR BLD HPLC: 5.2 %
HCT VFR BLD AUTO: 39 %
HDLC SERPL-MCNC: 87 MG/DL
HDLC SERPL: 31.8 %
HGB BLD-MCNC: 12.3 G/DL
IMM GRANULOCYTES # BLD AUTO: 0.01 K/UL
IMM GRANULOCYTES NFR BLD AUTO: 0.2 %
LDLC SERPL CALC-MCNC: 173.6 MG/DL
LYMPHOCYTES # BLD AUTO: 1.8 K/UL
LYMPHOCYTES NFR BLD: 32.8 %
MCH RBC QN AUTO: 28.9 PG
MCHC RBC AUTO-ENTMCNC: 31.5 G/DL
MCV RBC AUTO: 92 FL
MONOCYTES # BLD AUTO: 0.5 K/UL
MONOCYTES NFR BLD: 8.2 %
NEUTROPHILS # BLD AUTO: 3.1 K/UL
NEUTROPHILS NFR BLD: 55.4 %
NONHDLC SERPL-MCNC: 187 MG/DL
NRBC BLD-RTO: 0 /100 WBC
PLATELET # BLD AUTO: 269 K/UL
PMV BLD AUTO: 11.1 FL
POTASSIUM SERPL-SCNC: 4 MMOL/L
PROT SERPL-MCNC: 8.2 G/DL
RBC # BLD AUTO: 4.26 M/UL
SODIUM SERPL-SCNC: 140 MMOL/L
TRIGL SERPL-MCNC: 67 MG/DL
TSH SERPL DL<=0.005 MIU/L-ACNC: 2.25 UIU/ML
WBC # BLD AUTO: 5.51 K/UL

## 2018-12-21 PROCEDURE — 83036 HEMOGLOBIN GLYCOSYLATED A1C: CPT

## 2018-12-21 PROCEDURE — 84443 ASSAY THYROID STIM HORMONE: CPT

## 2018-12-21 PROCEDURE — 36415 COLL VENOUS BLD VENIPUNCTURE: CPT

## 2018-12-21 PROCEDURE — 80053 COMPREHEN METABOLIC PANEL: CPT

## 2018-12-21 PROCEDURE — 85025 COMPLETE CBC W/AUTO DIFF WBC: CPT

## 2018-12-21 PROCEDURE — 80061 LIPID PANEL: CPT

## 2018-12-24 ENCOUNTER — OFFICE VISIT (OUTPATIENT)
Dept: INTERNAL MEDICINE | Facility: CLINIC | Age: 58
End: 2018-12-24
Payer: COMMERCIAL

## 2018-12-24 VITALS
BODY MASS INDEX: 24.17 KG/M2 | SYSTOLIC BLOOD PRESSURE: 118 MMHG | HEIGHT: 66 IN | TEMPERATURE: 99 F | DIASTOLIC BLOOD PRESSURE: 76 MMHG | HEART RATE: 73 BPM | RESPIRATION RATE: 18 BRPM | WEIGHT: 150.38 LBS

## 2018-12-24 DIAGNOSIS — Z00.00 ANNUAL PHYSICAL EXAM: Primary | ICD-10-CM

## 2018-12-24 DIAGNOSIS — R79.89 ELEVATED LFTS: ICD-10-CM

## 2018-12-24 DIAGNOSIS — E78.5 HYPERLIPIDEMIA, UNSPECIFIED HYPERLIPIDEMIA TYPE: ICD-10-CM

## 2018-12-24 DIAGNOSIS — Z12.31 VISIT FOR SCREENING MAMMOGRAM: ICD-10-CM

## 2018-12-24 PROCEDURE — 99999 PR PBB SHADOW E&M-EST. PATIENT-LVL IV: CPT | Mod: PBBFAC,,, | Performed by: INTERNAL MEDICINE

## 2018-12-24 PROCEDURE — 99396 PREV VISIT EST AGE 40-64: CPT | Mod: S$GLB,,, | Performed by: INTERNAL MEDICINE

## 2018-12-24 NOTE — PROGRESS NOTES
Subjective:       Patient ID: Jacek Duran is a 58 y.o. female.    Chief Complaint: Annual Exam    HPI     58 y.o. female here for annual exam.     Cholesterol: elevated 274, , HDL 87  Vaccines: Influenza - done; Tetanus - 2018 (employee health)  Sexual Screening: not active  STD screening: not active  Eye exam: done last year  Mammogram: due  Gyn exam: due  Colonoscopy: done , due   A1c: 5.2    Exercise:  Not like she used to.  Rarely exercises.    Diet: recently fatty diet.    Gall sludge - had her gallbladder taken out and had a liver biopsy.  She was told that the surgeon could not clean out her gallbladder duct, because the tube is so small.      Past Medical History:   Diagnosis Date    Abnormal Pap smear     before , none since    Hyperlipidemia     Mild vitamin D deficiency      Past Surgical History:   Procedure Laterality Date    BIOPSY-LIVER N/A 2016    Performed by Ignacio Collins MD at [facility] OR [address] [place] [address]    breast augmentation       SECTION, LOW TRANSVERSE      CHOLECYSTECTOMY      CHOLECYSTECTOMY-LAPAROSCOPIC N/A 2016    Performed by Ignacio Collins MD at [facility] OR [address] [place] [address]     Social History     Socioeconomic History    Marital status: Single     Spouse name: Not on file    Number of children: 2    Years of education: Not on file    Highest education level: Not on file   Social Needs    Financial resource strain: Not on file    Food insecurity - worry: Not on file    Food insecurity - inability: Not on file    Transportation needs - medical: Not on file    Transportation needs - non-medical: Not on file   Occupational History    Occupation: [employer]     Employer: [employer]   Tobacco Use    Smoking status: Never Smoker    Smokeless tobacco: Never Used   Substance and Sexual Activity    Alcohol use: Yes     Comment: twice a month    Drug use: No    Sexual activity: Not Currently   Other Topics Concern    Are you pregnant  or think you may be? Not Asked    Breast-feeding Not Asked   Social History Narrative    She is exercising on and off.     Review of patient's allergies indicates:  No Known Allergies  Yajaira Duran had no medications administered during this visit.    Review of Systems   Constitutional: Negative for activity change and unexpected weight change.   HENT: Negative for hearing loss, rhinorrhea and trouble swallowing.    Eyes: Negative for discharge and visual disturbance.   Respiratory: Negative for chest tightness and wheezing.    Cardiovascular: Negative for chest pain and palpitations.   Gastrointestinal: Negative for blood in stool, constipation, diarrhea and vomiting.   Endocrine: Negative for polydipsia and polyuria.   Genitourinary: Negative for difficulty urinating, dysuria, hematuria and menstrual problem.   Musculoskeletal: Negative for arthralgias, joint swelling and neck pain.   Neurological: Negative for weakness and headaches.   Psychiatric/Behavioral: Negative for confusion and dysphoric mood.       Objective:      Physical Exam   Constitutional: She is oriented to person, place, and time. She appears well-developed and well-nourished.   HENT:   Head: Normocephalic and atraumatic.   Mouth/Throat: No oropharyngeal exudate.   Eyes: EOM are normal. Pupils are equal, round, and reactive to light. Right eye exhibits no discharge. Left eye exhibits no discharge. No scleral icterus.   Neck: Normal range of motion. Neck supple. No tracheal deviation present. No thyromegaly present.   Cardiovascular: Normal rate, regular rhythm and normal heart sounds. Exam reveals no gallop and no friction rub.   No murmur heard.  Pulmonary/Chest: Effort normal and breath sounds normal. No respiratory distress. She has no wheezes. She has no rales. She exhibits no tenderness.   Abdominal: Soft. Bowel sounds are normal. She exhibits no distension and no mass. There is no tenderness. There is no rebound and no guarding.    Musculoskeletal: Normal range of motion. She exhibits no edema or tenderness.   Neurological: She is alert and oriented to person, place, and time.   Skin: Skin is warm and dry. No rash noted. No erythema. No pallor.   Psychiatric: She has a normal mood and affect. Her behavior is normal.   Vitals reviewed.      Assessment:       1. Annual physical exam    2. Elevated LFTs    3. Hyperlipidemia, unspecified hyperlipidemia type    4. Visit for screening mammogram        Plan:       1.  Reviewed lab work with patient.  Up-to-date on vaccines.  Up-to-date on colonoscopy.  Mammogram ordered.  Up-to-date on gyn screening.  Discussed exercise.  2.  Refer to GI.  3.  Hold off on medication for now.  Adjust diet and return to clinic in 3 months with lipids and CMP prior.  4.  Mammogram.

## 2018-12-26 ENCOUNTER — HOSPITAL ENCOUNTER (OUTPATIENT)
Dept: RADIOLOGY | Facility: HOSPITAL | Age: 58
Discharge: HOME | End: 2018-12-26
Attending: INTERNAL MEDICINE
Payer: COMMERCIAL

## 2018-12-26 DIAGNOSIS — Z12.31 VISIT FOR SCREENING MAMMOGRAM: ICD-10-CM

## 2018-12-26 PROCEDURE — 77063 BREAST TOMOSYNTHESIS BI: CPT | Mod: 26,,, | Performed by: RADIOLOGY

## 2018-12-26 PROCEDURE — 77067 SCR MAMMO BI INCL CAD: CPT | Mod: TC

## 2018-12-26 PROCEDURE — 77063 BREAST TOMOSYNTHESIS BI: CPT | Mod: TC

## 2018-12-26 PROCEDURE — 77067 SCR MAMMO BI INCL CAD: CPT | Mod: 26,,, | Performed by: RADIOLOGY

## 2019-01-07 ENCOUNTER — OFFICE VISIT (OUTPATIENT)
Dept: OTOLARYNGOLOGY | Facility: CLINIC | Age: 59
End: 2019-01-07
Payer: COMMERCIAL

## 2019-01-07 ENCOUNTER — TELEPHONE (OUTPATIENT)
Dept: OTOLARYNGOLOGY | Facility: CLINIC | Age: 59
End: 2019-01-07

## 2019-01-07 VITALS
WEIGHT: 151.69 LBS | SYSTOLIC BLOOD PRESSURE: 115 MMHG | DIASTOLIC BLOOD PRESSURE: 76 MMHG | HEART RATE: 70 BPM | BODY MASS INDEX: 24.38 KG/M2 | HEIGHT: 66 IN

## 2019-01-07 DIAGNOSIS — H53.453 PERIPHERAL VISUAL FIELD DEFECT OF BOTH EYES: Primary | ICD-10-CM

## 2019-01-07 PROCEDURE — 3008F BODY MASS INDEX DOCD: CPT | Mod: CPTII,S$GLB,, | Performed by: OTOLARYNGOLOGY

## 2019-01-07 PROCEDURE — 3008F PR BODY MASS INDEX (BMI) DOCUMENTED: ICD-10-PCS | Mod: CPTII,S$GLB,, | Performed by: OTOLARYNGOLOGY

## 2019-01-07 PROCEDURE — 99999 PR PBB SHADOW E&M-EST. PATIENT-LVL III: CPT | Mod: PBBFAC,,, | Performed by: OTOLARYNGOLOGY

## 2019-01-07 PROCEDURE — 99204 OFFICE O/P NEW MOD 45 MIN: CPT | Mod: S$GLB,,, | Performed by: OTOLARYNGOLOGY

## 2019-01-07 PROCEDURE — 99204 PR OFFICE/OUTPT VISIT, NEW, LEVL IV, 45-59 MIN: ICD-10-PCS | Mod: S$GLB,,, | Performed by: OTOLARYNGOLOGY

## 2019-01-07 PROCEDURE — 99999 PR PBB SHADOW E&M-EST. PATIENT-LVL III: ICD-10-PCS | Mod: PBBFAC,,, | Performed by: OTOLARYNGOLOGY

## 2019-01-07 NOTE — TELEPHONE ENCOUNTER
----- Message from Roxanne Prescott sent at 1/7/2019  3:26 PM CST -----   Needs Advice    Reason for call: Patient is calling to speak to nurse to schedule surgery. States that she is looking to schedule during Week of March 11th        Communication Preference: ext. [phone]

## 2019-01-08 DIAGNOSIS — Z01.818 PRE-OP TESTING: Primary | ICD-10-CM

## 2019-01-08 DIAGNOSIS — H53.453 PERIPHERAL VISUAL FIELD DEFECT OF BOTH EYES: ICD-10-CM

## 2019-01-08 NOTE — CONSULTS
Ms. Duran presents for consultation.    VITAL SIGNS:  Per nurses' notes.    CHIEF COMPLAINT:  Sagging eyelids with forehead and eye pressure.    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  She has no   other symptoms.  She does have a history of hyperlipidemia.    REVIEW OF SYSTEMS:  CONSTITUTIONAL: Weight loss or weight gain: Negative.  ALLERGY/IMMUNOLOGIC: Negative.  ENT/Mouth:  Hearing Loss/Dizziness/Tinnitus: Negative.  Ear Infections/Otalgia: Negative.  Rhinitis/Sinusitis/Epistaxis: Negative.  Headache/Facial Pain: Negative.  Nasal Obstruction/Snoring/CORTNEY: Negative.  Throat: Infections/Pain: Negative.  Hoarseness/Speech Disturbance: Negative.  Salivary Glands Disorder: Negative.  Trauma: Hx: Negative.    Cardiovascular:  MI/Angina: Negative.  Hypertension: Negative.  Endo: DM/Steroids: Negative.  Eyes: Negative.  GI: Dysphagia/Reflux: Negative.  : GYN Pregnancy: Negative.      Renal: Dialysis: Negative.  Lymph: Neck Mass/Lymphadenopathy: Negative.  Musculoskeletal: Negative.  Hem: Bleeding Disorders/Anemia: Negative.  Neuro: Cranial/Neuralgia: Negative.  Pulm: Asthma/SOB/Cough: Negative.  Skin/Breast: Negative.    PAST MEDICAL/FAMILY/SOCIAL HISTORY:    Past Medical History   ENT Surgery: Negative.   Occupational Exposure: Negative.    Problems: Negative.   Cancer: Negative.   Past surgeries include , breast augmentation, cholecystectomy.    Past Family History   Family history hearing loss: Negative.   Family history cancer: Positive for colon cancer.   Positive for cataracts and heart disease and stroke.    Past Social History   Tobacco: She is a nonsmoker.   Alcohol: Twice a month social drinker.    MEDICATIONS:  Per MedCard.    ALLERGIES:  Per MedCard.    EXAMINATION:  General Appearance:  Well-developed,  well-nourished 58-year-old white female in   no apparent distress.  Communication Ability:  Good.  EARS, NOSE, THROAT, MOUTH;  EARS:   External auditory canals: Clear.   Hearing: Grossly Intact.   Tympanic membranes: Clear.  NOSE:   External: Grossly normal.   Intranasal:  MOUTH:   Intraorally: Lips, teeth and gums: Normal.   Oropharynx: Normal.   Mucosa: Normal.  THROAT:   Tongue: Normal.   Palate: Normal.   Tonsils: Normal.   Posterior pharynx: Normal.  HEAD/FACE INSPECTION: Normal and atraumatic.   Palpation/Percussion:  Non tender.   Facial Strength: Normal and symmetric.   Salivary glands: Normal.    NECK: Supple.  THYROID: No masses.  LYMPHATICS: No nodes.  RESPIRATORY:   Effort: Normal.  EYES: She shows bilateral brow ptosis with relatively deep set eyes.  She does   have some excess skin of the upper lids, right greater than the left.  Goldmann   visual field study, which was obtained six weeks ago, shows her left side   resting field at 20 degrees and corrected to 55 degrees and right resting at 10   degrees and corrected to 45 degrees.  Thus, she would qualify for correction.   Ocular Mobility: Normal.   Vision: Grossly intact.  NEURO/PSYCH:   Cranial nerves: 2-12 grossly intact.   Orientation: x3.   Mood/Affect: Normal.    RECOMMENDATION:  I have discussed my findings with her in detail as well as my   recommendations for treatment.  We have reviewed the anatomy in its entirety.  I   have also shown her in the mirror what we are trying to achieve.  My   recommendation would be for endoscopic brow lift with upper lid blepharoplasty.    We will set this up for her at her convenience.      HG/HN  dd: 01/07/2019 15:02:14 (CST)  td: 01/08/2019 01:13:59 (CST)  Doc ID   #[id number]  Job ID #[id number]    CC:

## 2019-01-15 ENCOUNTER — TELEPHONE (OUTPATIENT)
Dept: OPTOMETRY | Facility: CLINIC | Age: 59
End: 2019-01-15

## 2019-01-22 ENCOUNTER — PATIENT MESSAGE (OUTPATIENT)
Dept: SURGERY | Facility: HOSPITAL | Age: 59
End: 2019-01-22

## 2019-01-22 ENCOUNTER — OFFICE VISIT (OUTPATIENT)
Dept: OPTOMETRY | Facility: CLINIC | Age: 59
End: 2019-01-22
Payer: COMMERCIAL

## 2019-01-22 DIAGNOSIS — H52.13 MYOPIA WITH PRESBYOPIA, BILATERAL: Primary | ICD-10-CM

## 2019-01-22 DIAGNOSIS — Z46.0 FITTING AND ADJUSTMENT OF SPECTACLES AND CONTACT LENSES: Primary | ICD-10-CM

## 2019-01-22 DIAGNOSIS — Z46.0 FITTING AND ADJUSTMENT OF SPECTACLES AND CONTACT LENSES: ICD-10-CM

## 2019-01-22 DIAGNOSIS — H52.4 MYOPIA WITH PRESBYOPIA, BILATERAL: Primary | ICD-10-CM

## 2019-01-22 PROCEDURE — 99999 PR PBB SHADOW E&M-EST. PATIENT-LVL II: CPT | Mod: PBBFAC,,, | Performed by: OPTOMETRIST

## 2019-01-22 PROCEDURE — 92014 PR EYE EXAM, EST PATIENT,COMPREHESV: ICD-10-PCS | Mod: S$GLB,,, | Performed by: OPTOMETRIST

## 2019-01-22 PROCEDURE — 92014 COMPRE OPH EXAM EST PT 1/>: CPT | Mod: S$GLB,,, | Performed by: OPTOMETRIST

## 2019-01-22 PROCEDURE — 92310 CONTACT LENS FITTING OU: CPT | Mod: ,,, | Performed by: OPTOMETRIST

## 2019-01-22 PROCEDURE — 92015 DETERMINE REFRACTIVE STATE: CPT | Mod: S$GLB,,, | Performed by: OPTOMETRIST

## 2019-01-22 PROCEDURE — 99999 PR PBB SHADOW E&M-EST. PATIENT-LVL II: ICD-10-PCS | Mod: PBBFAC,,, | Performed by: OPTOMETRIST

## 2019-01-22 PROCEDURE — 92015 PR REFRACTION: ICD-10-PCS | Mod: S$GLB,,, | Performed by: OPTOMETRIST

## 2019-01-22 PROCEDURE — 92310 PR CONTACT LENS FITTING (NO CHANGE): ICD-10-PCS | Mod: ,,, | Performed by: OPTOMETRIST

## 2019-01-24 NOTE — PROGRESS NOTES
HPI     DLS 1/26/17 Dr Pa  Needs new glasses and contact lens rx  Occasionally sleeps with lenses  Mono vision od with cl distance, os no lens for near  No flashes or floaters  No itching burning or tearing  No diplopia   No drops   Problems with glare at night  maty'd for blepharoplasty in March with Dr Magaña    Last edited by Dang Petersen on 1/22/2019  2:23 PM. (History)            Assessment /Plan     For exam results, see Encounter Report.    Myopia with presbyopia, bilateral   Rx specs  Fitting and adjustment of spectacles and contact lenses   Dispensed trials of Dailies total one -0.50 High OU    Remove nightly, replace daily   OK to order if happy with vision and comfort OU     RTC 1 year, DFE

## 2019-01-28 ENCOUNTER — PATIENT MESSAGE (OUTPATIENT)
Dept: OPTOMETRY | Facility: CLINIC | Age: 59
End: 2019-01-28

## 2019-02-21 ENCOUNTER — OFFICE VISIT (OUTPATIENT)
Dept: DERMATOLOGY | Facility: CLINIC | Age: 59
End: 2019-02-21
Payer: COMMERCIAL

## 2019-02-21 DIAGNOSIS — L81.4 LENTIGO: ICD-10-CM

## 2019-02-21 DIAGNOSIS — D22.62 MELANOCYTIC NEVUS OF LEFT UPPER EXTREMITY: ICD-10-CM

## 2019-02-21 DIAGNOSIS — D48.5 NEOPLASM OF UNCERTAIN BEHAVIOR OF SKIN: Primary | ICD-10-CM

## 2019-02-21 DIAGNOSIS — D22.61 MELANOCYTIC NEVUS OF RIGHT UPPER EXTREMITY: ICD-10-CM

## 2019-02-21 DIAGNOSIS — Z86.018 HISTORY OF DYSPLASTIC NEVUS: ICD-10-CM

## 2019-02-21 DIAGNOSIS — L82.1 SEBORRHEIC KERATOSIS: ICD-10-CM

## 2019-02-21 PROCEDURE — 11300 PR SHAV SKIN LES < 0.5 CM TRUNK,ARM,LEG: ICD-10-PCS | Mod: S$GLB,,, | Performed by: DERMATOLOGY

## 2019-02-21 PROCEDURE — 99203 PR OFFICE/OUTPT VISIT, NEW, LEVL III, 30-44 MIN: ICD-10-PCS | Mod: 25,S$GLB,, | Performed by: DERMATOLOGY

## 2019-02-21 PROCEDURE — 88342 TISSUE SPECIMEN TO PATHOLOGY, DERMATOLOGY: ICD-10-PCS | Mod: 26,,, | Performed by: PATHOLOGY

## 2019-02-21 PROCEDURE — 99203 OFFICE O/P NEW LOW 30 MIN: CPT | Mod: 25,S$GLB,, | Performed by: DERMATOLOGY

## 2019-02-21 PROCEDURE — 11300 SHAVE SKIN LESION 0.5 CM/<: CPT | Mod: S$GLB,,, | Performed by: DERMATOLOGY

## 2019-02-21 PROCEDURE — 88305 TISSUE SPECIMEN TO PATHOLOGY, DERMATOLOGY: ICD-10-PCS | Mod: 26,,, | Performed by: PATHOLOGY

## 2019-02-21 PROCEDURE — 88305 TISSUE EXAM BY PATHOLOGIST: CPT | Performed by: PATHOLOGY

## 2019-02-21 PROCEDURE — 88342 IMHCHEM/IMCYTCHM 1ST ANTB: CPT | Mod: 26,,, | Performed by: PATHOLOGY

## 2019-02-21 NOTE — PROGRESS NOTES
Subjective:       Patient ID:  Jacek Duran is a 58 y.o. female who presents for   Chief Complaint   Patient presents with    Spot     L chest    Mole     FBSE     Spot  - Initial  Affected locations: left cheek  Duration: 3 months  Signs / symptoms: asymptomatic  Severity: mild  Timing: constant  Aggravated by: picking  Relieving factors/Treatments tried: nothing  Improvement on treatment: no relief    Mole  - Initial  Affected locations: diffuse  Duration: 10 years  Signs / symptoms: asymptomatic  Severity: mild  Timing: constant  Aggravated by: nothing  Relieving factors/Treatments tried: nothing  Improvement on treatment: no relief      Review of Systems   Musculoskeletal: Negative for joint swelling and arthralgias.   Skin: Negative for recent sunburn.   Hematologic/Lymphatic: Does not bruise/bleed easily.        Objective:    Physical Exam   Constitutional: She appears well-developed and well-nourished. No distress.   HENT:   Mouth/Throat: Lips normal.    Eyes: Lids are normal.  No conjunctival no injection.   Neurological: She is alert and oriented to person, place, and time. She is not disoriented.   Psychiatric: She has a normal mood and affect.   Skin:   Areas Examined (abnormalities noted in diagram):   Scalp / Hair Palpated and Inspected  Head / Face Inspection Performed  Neck Inspection Performed  Chest / Axilla Inspection Performed  Abdomen Inspection Performed  Genitals / Buttocks / Groin Inspection Performed  Back Inspection Performed  RUE Inspected  LUE Inspection Performed  RLE Inspected  LLE Inspection Performed  Nails and Digits Inspection Performed                   Diagram Legend     Erythematous scaling macule/papule c/w actinic keratosis       Vascular papule c/w angioma      Pigmented verrucoid papule/plaque c/w seborrheic keratosis      Yellow umbilicated papule c/w sebaceous hyperplasia      Irregularly shaped tan macule c/w lentigo     1-2 mm smooth white papules consistent with  Milia      Movable subcutaneous cyst with punctum c/w epidermal inclusion cyst      Subcutaneous movable cyst c/w pilar cyst      Firm pink to brown papule c/w dermatofibroma      Pedunculated fleshy papule(s) c/w skin tag(s)      Evenly pigmented macule c/w junctional nevus     Mildly variegated pigmented, slightly irregular-bordered macule c/w mildly atypical nevus      Flesh colored to evenly pigmented papule c/w intradermal nevus       Pink pearly papule/plaque c/w basal cell carcinoma      Erythematous hyperkeratotic cursted plaque c/w SCC      Surgical scar with no sign of skin cancer recurrence      Open and closed comedones      Inflammatory papules and pustules      Verrucoid papule consistent consistent with wart     Erythematous eczematous patches and plaques     Dystrophic onycholytic nail with subungual debris c/w onychomycosis     Umbilicated papule    Erythematous-base heme-crusted tan verrucoid plaque consistent with inflamed seborrheic keratosis     Erythematous Silvery Scaling Plaque c/w Psoriasis     See annotation      Assessment / Plan:      Pathology Orders:     Normal Orders This Visit    Tissue Specimen To Pathology, Dermatology     Questions:    Directional Terms:  Other(comment)    Clinical Information:  r/o DN    Specific Site:  mid upper abdomen        Neoplasm of uncertain behavior of skin  -     Tissue Specimen To Pathology, Dermatology  Shave removal procedure note:  Risk, benefits, and alternatives of shave removal are discussed with the patient, including risk of infection, scar, recurrence, and need for additional treatment of site. The patient agrees to the procedure by verbal consent. The area is marked and prepped with alcohol.  Approximately 1 mL of lidocaine 1% with epinephrine is used for local anesthesia. A sharp blade is used to remove the entire lesion with a minimal margin of normal appearing skin. The specimen is sent to pathology for histologic confirmation. Hemostasis  is obtained with aluminum chloride and/or monopolar hyfrecation if needed. The area is then dressed and bandaged. The patient tolerated the procedure well without adverse event. Written instructions on wound care were given and were reviewed with the patient, who is to call for any signs of bleeding or infection. The patient will be notified of the pathology results.  Defect: 5 x 3 mm    Seborrheic keratosis  These are benign, inherited growths without a malignant potential. Reassurance given to patient. No treatment is necessary. Handout was provided.    Melanocytic nevus of left upper extremity  Melanocytic nevus of right upper extremity  Multiple benign-appearing nevi present on exam today. Reassurance provided. Counseled patient to periodically examine moles and return to clinic if any changes in size, shape, or color are noted or if it becomes symptomatic (bleeding, itching, pain, etc).    Lentigo  These are benign sun spots which should be monitored for changes. Daily sun protection will reduce the number of new lesions.   Patient instructed in importance of daily broad-spectrum sunscreen use with SPF of at least 30. Sun avoidance and topical protection/protective clothing discussed.    History of dysplastic nevus  Area(s) of previous dysplastic nevus evaluated with no signs of recurrence. Reassurance provided.    Follow-up in about 1 year (around 2/21/2020) for TBSE, or sooner if any new problems or changing lesions.

## 2019-02-27 ENCOUNTER — TELEPHONE (OUTPATIENT)
Dept: OPHTHALMOLOGY | Facility: CLINIC | Age: 59
End: 2019-02-27

## 2019-02-27 ENCOUNTER — TELEPHONE (OUTPATIENT)
Dept: OTOLARYNGOLOGY | Facility: CLINIC | Age: 59
End: 2019-02-27

## 2019-02-27 NOTE — TELEPHONE ENCOUNTER
----- Message from Andrew Harper sent at 2/27/2019  2:24 PM CST -----  Contact: Cyndie( [facility])  Ms. Agee would like for you to contact her because she needs Ms. Renee her mrd or marginal reflex distant. She can be reached at [phone].

## 2019-02-27 NOTE — TELEPHONE ENCOUNTER
----- Message from Litzy Leyva sent at 2/27/2019 12:17 PM CST -----  Contact: Kelly (ENT)  Needs Advice    Reason for call:Kelly called to f/u on getting a fax .        Communication Preference:[phone]    Additional Information:

## 2019-02-27 NOTE — TELEPHONE ENCOUNTER
----- Message from Ana Lilia Roldan sent at 2/27/2019  3:18 PM CST -----  Contact: pt   Needs Advice    Reason for call: pt is calling to speak with the nurse to see if they got the numbers for her test         Communication Preference:can you please call pt at [phone]    Additional Information: none    JAHAIRA

## 2019-03-06 ENCOUNTER — TELEPHONE (OUTPATIENT)
Dept: OTOLARYNGOLOGY | Facility: CLINIC | Age: 59
End: 2019-03-06

## 2019-03-11 ENCOUNTER — TELEPHONE (OUTPATIENT)
Dept: OTOLARYNGOLOGY | Facility: CLINIC | Age: 59
End: 2019-03-11

## 2019-03-11 NOTE — TELEPHONE ENCOUNTER
----- Message from Roxanne Prescott sent at 1/7/2019  3:26 PM CST -----   Needs Advice    Reason for call: Patient is calling to speak to nurse to schedule surgery. States that she is looking to schedule during Week of March 11th        Communication Preference: ext. [phone]

## 2019-03-12 ENCOUNTER — LAB VISIT (OUTPATIENT)
Dept: LAB | Facility: HOSPITAL | Age: 59
End: 2019-03-12
Attending: OTOLARYNGOLOGY
Payer: COMMERCIAL

## 2019-03-12 DIAGNOSIS — Z01.818 PRE-OP TESTING: ICD-10-CM

## 2019-03-12 LAB
ANION GAP SERPL CALC-SCNC: 8 MMOL/L
BASOPHILS # BLD AUTO: 0.03 K/UL
BASOPHILS NFR BLD: 0.6 %
BUN SERPL-MCNC: 15 MG/DL
CALCIUM SERPL-MCNC: 9.7 MG/DL
CHLORIDE SERPL-SCNC: 107 MMOL/L
CO2 SERPL-SCNC: 26 MMOL/L
CREAT SERPL-MCNC: 0.8 MG/DL
DIFFERENTIAL METHOD: NORMAL
EOSINOPHIL # BLD AUTO: 0.1 K/UL
EOSINOPHIL NFR BLD: 2.5 %
ERYTHROCYTE [DISTWIDTH] IN BLOOD BY AUTOMATED COUNT: 12.4 %
EST. GFR  (AFRICAN AMERICAN): >60 ML/MIN/1.73 M^2
EST. GFR  (NON AFRICAN AMERICAN): >60 ML/MIN/1.73 M^2
GLUCOSE SERPL-MCNC: 100 MG/DL
HCT VFR BLD AUTO: 38.8 %
HGB BLD-MCNC: 12.5 G/DL
IMM GRANULOCYTES # BLD AUTO: 0.01 K/UL
IMM GRANULOCYTES NFR BLD AUTO: 0.2 %
LYMPHOCYTES # BLD AUTO: 1.8 K/UL
LYMPHOCYTES NFR BLD: 33.6 %
MCH RBC QN AUTO: 30 PG
MCHC RBC AUTO-ENTMCNC: 32.2 G/DL
MCV RBC AUTO: 93 FL
MONOCYTES # BLD AUTO: 0.4 K/UL
MONOCYTES NFR BLD: 8.1 %
NEUTROPHILS # BLD AUTO: 2.9 K/UL
NEUTROPHILS NFR BLD: 55 %
NRBC BLD-RTO: 0 /100 WBC
PLATELET # BLD AUTO: 237 K/UL
PLATELET FUNCTION ASSAY - EPINEPHRINE: 123 SECS
PMV BLD AUTO: 10.5 FL
POTASSIUM SERPL-SCNC: 3.7 MMOL/L
RBC # BLD AUTO: 4.17 M/UL
SODIUM SERPL-SCNC: 141 MMOL/L
WBC # BLD AUTO: 5.21 K/UL

## 2019-03-12 PROCEDURE — 85576 BLOOD PLATELET AGGREGATION: CPT

## 2019-03-12 PROCEDURE — 36415 COLL VENOUS BLD VENIPUNCTURE: CPT

## 2019-03-12 PROCEDURE — 80048 BASIC METABOLIC PNL TOTAL CA: CPT

## 2019-03-12 PROCEDURE — 85025 COMPLETE CBC W/AUTO DIFF WBC: CPT

## 2019-03-13 ENCOUNTER — TELEPHONE (OUTPATIENT)
Dept: OTOLARYNGOLOGY | Facility: CLINIC | Age: 59
End: 2019-03-13

## 2019-03-14 ENCOUNTER — ANESTHESIA EVENT (OUTPATIENT)
Dept: SURGERY | Facility: HOSPITAL | Age: 59
End: 2019-03-14
Payer: COMMERCIAL

## 2019-03-14 ENCOUNTER — HOSPITAL ENCOUNTER (OUTPATIENT)
Facility: HOSPITAL | Age: 59
Discharge: HOME | End: 2019-03-14
Attending: OTOLARYNGOLOGY | Admitting: OTOLARYNGOLOGY
Payer: COMMERCIAL

## 2019-03-14 ENCOUNTER — ANESTHESIA (OUTPATIENT)
Dept: SURGERY | Facility: HOSPITAL | Age: 59
End: 2019-03-14
Payer: COMMERCIAL

## 2019-03-14 DIAGNOSIS — H02.409 BLEPHAROPTOSIS: Primary | ICD-10-CM

## 2019-03-14 PROBLEM — T14.8XXA WOUND DRAINAGE: Status: ACTIVE | Noted: 2019-03-14

## 2019-03-14 PROCEDURE — 63600175 PHARM REV CODE 636 W HCPCS

## 2019-03-14 PROCEDURE — 71000044 HC DOSC ROUTINE RECOVERY FIRST HOUR: Performed by: OTOLARYNGOLOGY

## 2019-03-14 PROCEDURE — 25000003 PHARM REV CODE 250

## 2019-03-14 PROCEDURE — 63600175 PHARM REV CODE 636 W HCPCS: Performed by: NURSE ANESTHETIST, CERTIFIED REGISTERED

## 2019-03-14 PROCEDURE — D9220A PRA ANESTHESIA: Mod: CRNA,,, | Performed by: NURSE ANESTHETIST, CERTIFIED REGISTERED

## 2019-03-14 PROCEDURE — 36000707: Performed by: OTOLARYNGOLOGY

## 2019-03-14 PROCEDURE — 71000015 HC POSTOP RECOV 1ST HR: Performed by: OTOLARYNGOLOGY

## 2019-03-14 PROCEDURE — 37000008 HC ANESTHESIA 1ST 15 MINUTES: Performed by: OTOLARYNGOLOGY

## 2019-03-14 PROCEDURE — 25000003 PHARM REV CODE 250: Performed by: NURSE ANESTHETIST, CERTIFIED REGISTERED

## 2019-03-14 PROCEDURE — 37000009 HC ANESTHESIA EA ADD 15 MINS: Performed by: OTOLARYNGOLOGY

## 2019-03-14 PROCEDURE — 15822 PR REVISION OF UPPER EYELID: ICD-10-PCS | Mod: 50,,, | Performed by: OTOLARYNGOLOGY

## 2019-03-14 PROCEDURE — 36000706: Performed by: OTOLARYNGOLOGY

## 2019-03-14 PROCEDURE — D9220A PRA ANESTHESIA: Mod: ANES,,, | Performed by: ANESTHESIOLOGY

## 2019-03-14 PROCEDURE — 25000003 PHARM REV CODE 250: Performed by: OTOLARYNGOLOGY

## 2019-03-14 PROCEDURE — 27100025 HC TUBING, SET FLUID WARMER: Performed by: NURSE ANESTHETIST, CERTIFIED REGISTERED

## 2019-03-14 PROCEDURE — 15822 BLEPHAROPLASTY UPPER EYELID: CPT | Mod: 50,,, | Performed by: OTOLARYNGOLOGY

## 2019-03-14 PROCEDURE — D9220A PRA ANESTHESIA: ICD-10-PCS | Mod: CRNA,,, | Performed by: NURSE ANESTHETIST, CERTIFIED REGISTERED

## 2019-03-14 PROCEDURE — D9220A PRA ANESTHESIA: ICD-10-PCS | Mod: ANES,,, | Performed by: ANESTHESIOLOGY

## 2019-03-14 RX ORDER — MIDAZOLAM HYDROCHLORIDE 1 MG/ML
INJECTION, SOLUTION INTRAMUSCULAR; INTRAVENOUS
Status: DISCONTINUED | OUTPATIENT
Start: 2019-03-14 | End: 2019-03-14

## 2019-03-14 RX ORDER — PHENYLEPHRINE HCL IN 0.9% NACL 1 MG/10 ML
SYRINGE (ML) INTRAVENOUS
Status: DISCONTINUED | OUTPATIENT
Start: 2019-03-14 | End: 2019-03-14

## 2019-03-14 RX ORDER — OXYCODONE AND ACETAMINOPHEN 5; 325 MG/1; MG/1
1 TABLET ORAL EVERY 4 HOURS PRN
Qty: 20 TABLET | Refills: 0 | Status: SHIPPED | OUTPATIENT
Start: 2019-03-14 | End: 2019-04-15

## 2019-03-14 RX ORDER — PROPOFOL 10 MG/ML
VIAL (ML) INTRAVENOUS
Status: DISCONTINUED | OUTPATIENT
Start: 2019-03-14 | End: 2019-03-14

## 2019-03-14 RX ORDER — LIDOCAINE HCL/PF 100 MG/5ML
SYRINGE (ML) INTRAVENOUS
Status: DISCONTINUED | OUTPATIENT
Start: 2019-03-14 | End: 2019-03-14

## 2019-03-14 RX ORDER — DEXAMETHASONE SODIUM PHOSPHATE 4 MG/ML
INJECTION, SOLUTION INTRA-ARTICULAR; INTRALESIONAL; INTRAMUSCULAR; INTRAVENOUS; SOFT TISSUE
Status: DISCONTINUED | OUTPATIENT
Start: 2019-03-14 | End: 2019-03-14

## 2019-03-14 RX ORDER — KETAMINE HCL IN 0.9 % NACL 50 MG/5 ML
SYRINGE (ML) INTRAVENOUS
Status: DISCONTINUED | OUTPATIENT
Start: 2019-03-14 | End: 2019-03-14

## 2019-03-14 RX ORDER — ONDANSETRON 8 MG/1
8 TABLET, ORALLY DISINTEGRATING ORAL EVERY 12 HOURS PRN
Qty: 10 TABLET | Refills: 0 | Status: SHIPPED | OUTPATIENT
Start: 2019-03-14 | End: 2019-03-20

## 2019-03-14 RX ORDER — CEFAZOLIN SODIUM 1 G/3ML
2 INJECTION, POWDER, FOR SOLUTION INTRAMUSCULAR; INTRAVENOUS
Status: DISCONTINUED | OUTPATIENT
Start: 2019-03-14 | End: 2019-03-14 | Stop reason: HOSPADM

## 2019-03-14 RX ORDER — ROCURONIUM BROMIDE 10 MG/ML
INJECTION, SOLUTION INTRAVENOUS
Status: DISCONTINUED | OUTPATIENT
Start: 2019-03-14 | End: 2019-03-14

## 2019-03-14 RX ORDER — GLYCOPYRROLATE 0.2 MG/ML
INJECTION INTRAMUSCULAR; INTRAVENOUS
Status: DISCONTINUED | OUTPATIENT
Start: 2019-03-14 | End: 2019-03-14

## 2019-03-14 RX ORDER — OXYCODONE AND ACETAMINOPHEN 5; 325 MG/1; MG/1
1 TABLET ORAL EVERY 4 HOURS PRN
Status: DISCONTINUED | OUTPATIENT
Start: 2019-03-14 | End: 2019-03-14 | Stop reason: HOSPADM

## 2019-03-14 RX ORDER — ACETAMINOPHEN 10 MG/ML
INJECTION, SOLUTION INTRAVENOUS
Status: DISCONTINUED | OUTPATIENT
Start: 2019-03-14 | End: 2019-03-14

## 2019-03-14 RX ORDER — NEOSTIGMINE METHYLSULFATE 0.5 MG/ML
INJECTION, SOLUTION INTRAVENOUS
Status: DISCONTINUED | OUTPATIENT
Start: 2019-03-14 | End: 2019-03-14

## 2019-03-14 RX ORDER — CEPHALEXIN 500 MG/1
500 CAPSULE ORAL EVERY 12 HOURS
Qty: 14 CAPSULE | Refills: 0 | Status: SHIPPED | OUTPATIENT
Start: 2019-03-14 | End: 2019-03-21

## 2019-03-14 RX ORDER — ONDANSETRON 2 MG/ML
INJECTION INTRAMUSCULAR; INTRAVENOUS
Status: DISCONTINUED | OUTPATIENT
Start: 2019-03-14 | End: 2019-03-14

## 2019-03-14 RX ORDER — FENTANYL CITRATE 50 UG/ML
INJECTION, SOLUTION INTRAMUSCULAR; INTRAVENOUS
Status: DISCONTINUED | OUTPATIENT
Start: 2019-03-14 | End: 2019-03-14

## 2019-03-14 RX ORDER — EPHEDRINE SULFATE 50 MG/ML
INJECTION, SOLUTION INTRAVENOUS
Status: DISCONTINUED | OUTPATIENT
Start: 2019-03-14 | End: 2019-03-14

## 2019-03-14 RX ORDER — LIDOCAINE HCL/EPINEPHRINE/PF 2%-1:200K
VIAL (ML) INJECTION
Status: DISCONTINUED | OUTPATIENT
Start: 2019-03-14 | End: 2019-03-14 | Stop reason: HOSPADM

## 2019-03-14 RX ORDER — SODIUM CHLORIDE 9 MG/ML
INJECTION, SOLUTION INTRAVENOUS CONTINUOUS PRN
Status: DISCONTINUED | OUTPATIENT
Start: 2019-03-14 | End: 2019-03-14

## 2019-03-14 RX ORDER — HYDROMORPHONE HYDROCHLORIDE 1 MG/ML
0.2 INJECTION, SOLUTION INTRAMUSCULAR; INTRAVENOUS; SUBCUTANEOUS EVERY 5 MIN PRN
Status: DISCONTINUED | OUTPATIENT
Start: 2019-03-14 | End: 2019-03-14 | Stop reason: HOSPADM

## 2019-03-14 RX ORDER — PROPOFOL 10 MG/ML
VIAL (ML) INTRAVENOUS CONTINUOUS PRN
Status: DISCONTINUED | OUTPATIENT
Start: 2019-03-14 | End: 2019-03-14

## 2019-03-14 RX ADMIN — Medication 50 MCG: at 10:03

## 2019-03-14 RX ADMIN — LIDOCAINE HYDROCHLORIDE 75 MG: 20 INJECTION INTRAVENOUS at 10:03

## 2019-03-14 RX ADMIN — GLYCOPYRROLATE 0.4 MG: 0.2 INJECTION INTRAMUSCULAR; INTRAVENOUS at 11:03

## 2019-03-14 RX ADMIN — PROPOFOL 150 MCG/KG/MIN: 10 INJECTION, EMULSION INTRAVENOUS at 10:03

## 2019-03-14 RX ADMIN — Medication 100 MCG: at 10:03

## 2019-03-14 RX ADMIN — EPHEDRINE SULFATE 5 MG: 50 INJECTION INTRAVENOUS at 11:03

## 2019-03-14 RX ADMIN — ONDANSETRON 4 MG: 2 INJECTION INTRAMUSCULAR; INTRAVENOUS at 11:03

## 2019-03-14 RX ADMIN — DEXAMETHASONE SODIUM PHOSPHATE 8 MG: 4 INJECTION, SOLUTION INTRAMUSCULAR; INTRAVENOUS at 10:03

## 2019-03-14 RX ADMIN — PROPOFOL: 10 INJECTION, EMULSION INTRAVENOUS at 11:03

## 2019-03-14 RX ADMIN — SODIUM CHLORIDE: 0.9 INJECTION, SOLUTION INTRAVENOUS at 09:03

## 2019-03-14 RX ADMIN — EPHEDRINE SULFATE 5 MG: 50 INJECTION INTRAVENOUS at 10:03

## 2019-03-14 RX ADMIN — FENTANYL CITRATE 100 MCG: 50 INJECTION INTRAMUSCULAR; INTRAVENOUS at 10:03

## 2019-03-14 RX ADMIN — CEFAZOLIN 2 G: 330 INJECTION, POWDER, FOR SOLUTION INTRAMUSCULAR; INTRAVENOUS at 10:03

## 2019-03-14 RX ADMIN — EPHEDRINE SULFATE 10 MG: 50 INJECTION INTRAVENOUS at 11:03

## 2019-03-14 RX ADMIN — OXYCODONE HYDROCHLORIDE AND ACETAMINOPHEN 1 TABLET: 5; 325 TABLET ORAL at 01:03

## 2019-03-14 RX ADMIN — EPHEDRINE SULFATE 10 MG: 50 INJECTION INTRAVENOUS at 12:03

## 2019-03-14 RX ADMIN — SODIUM CHLORIDE, SODIUM GLUCONATE, SODIUM ACETATE, POTASSIUM CHLORIDE, MAGNESIUM CHLORIDE, SODIUM PHOSPHATE, DIBASIC, AND POTASSIUM PHOSPHATE: .53; .5; .37; .037; .03; .012; .00082 INJECTION, SOLUTION INTRAVENOUS at 11:03

## 2019-03-14 RX ADMIN — EPHEDRINE SULFATE 10 MG: 50 INJECTION INTRAVENOUS at 10:03

## 2019-03-14 RX ADMIN — NEOSTIGMINE METHYLSULFATE 3 MG: 0.5 INJECTION INTRAVENOUS at 11:03

## 2019-03-14 RX ADMIN — MIDAZOLAM HYDROCHLORIDE 2 MG: 1 INJECTION, SOLUTION INTRAMUSCULAR; INTRAVENOUS at 10:03

## 2019-03-14 RX ADMIN — ROCURONIUM BROMIDE 40 MG: 10 INJECTION, SOLUTION INTRAVENOUS at 10:03

## 2019-03-14 RX ADMIN — ACETAMINOPHEN 1000 MG: 10 INJECTION, SOLUTION INTRAVENOUS at 11:03

## 2019-03-14 RX ADMIN — Medication 10 MG: at 10:03

## 2019-03-14 RX ADMIN — Medication 20 MG: at 11:03

## 2019-03-14 RX ADMIN — PROPOFOL 130 MG: 10 INJECTION, EMULSION INTRAVENOUS at 10:03

## 2019-03-14 NOTE — OP NOTE
[facility]  Surgery Department  Operative Note    SUMMARY     Date of Procedure: 3/14/2019     Procedure: Procedure(s) (LRB):  BLEPHAROPLASTY (Bilateral) upper lids CPT 67000-32    Surgeon(s) and Role:     * Beltran Magaña III, MD - Primary     * Tiago Osborn MD - Resident - Assisting    Pre-Operative Diagnosis: Peripheral visual field defect of both eyes [H53.453]  Bilateral upper lid dermatochlasis    Post-Operative Diagnosis: Post-Op Diagnosis Codes:     * Peripheral visual field defect of both eyes [H53.453]    Anesthesia: General    INDICATION:  This 58 F shows bilateral brow ptosis with relatively deep set eyes.  She does   have some excess skin of the upper lids, right greater than the left.  Goldmann   visual field study, which was obtained six weeks ago, shows her left side   resting field at 20 degrees and corrected to 55 degrees and right resting at 10   degrees and corrected to 45 degrees. She was explained the risks/benefits to the above procedure and consented freely to undergo surgery.    Description of the Findings of the Procedure: See above for details of physical exam findings. Uncomplicated procedure removing skin, muscle, and fat bilateral upper lids.    Technique:  Patient taken to OR. General endotracheal Anesthesia was obtained without issues.  The excess and redundant skin of the upper lids producing redundancy and impairmentof lateral vision was carefully measured, and the incisions were marked for fusiform excision with a marking pen.     The upper eyelid areas were bilaterally injected with 2% Lidocaine with 1:100,000  Epinephrine for anesthesia and vasoconstriction. The plane of injection was superficial  and external to the orbital septum of the upper and lower eyelids bilaterally.    The face was prepped and draped in the usual sterile manner.  After waiting a period of approximately ten minutes for adequate vasoconstriction, the  previously outlined excessive  skin of the right upper eyelid was excised with blunt and then sharp dissection. Hemostasis was obtained with limited monopolar cautery. A thin strip of orbicularis oculi muscle was excised in order to expose the orbital septum on the right. The orbital septum was identified and incised, and herniated orbital fat was exposed. The abnormally protruding positions in the middle pocket were carefully excised and the stalk meticulously cauterized. A similar procedure was  performed exposing herniated portion of the nasal fat pocket. Great care was taken to  obtain perfect hemostasis with this maneuver.     A similar procedure of removing skin and  taking care of the herniated fat was performed on the left upper eyelid in the same  fashion. Careful hemostasis had been obtained on the upper lid areas. The lateral  aspects of the upper eyelid incisions were closed with running 6-0  blue  prolene sutures with a 6-0 neurolon stitch in the middle to help remove the prolene @ future office visit.    At the end of the operation the patient?s vision and extraocular muscle movements were  checked and found to be intact. There was no diplopia, no ptosis, no ectropion.  Wounds were reexamined for hemostasis, and no hematomas were noted. Cooled  saline compresses were placed over the upper and lower eyelid regions bilaterally,    The procedures were completed without complication and tolerated well.      Afollow-up appointment was scheduled, routine post-op medications prescribed, and  post-op instructions given     Complications: No    Estimated Blood Loss (EBL): minimal           Implants: none  Specimens:   Specimen (12h ago, onward)    None                  Condition: Good    Disposition: PACU - hemodynamically stable.    Attestation: Dr. Magaña conducted the entirety of the procedure.

## 2019-03-14 NOTE — PLAN OF CARE
Patient states they are ready to be discharged. Instructions and prescription given to patient and family. Both verbalize understanding. Patient tolerating po liquids with no difficulty. Patient states pain is at a tolerable level for them. Anesthesia consent and surgical consent in chart upon patient's discharge from [facility].

## 2019-03-14 NOTE — DISCHARGE INSTRUCTIONS
Recovery After Procedural Sedation (Adult)  You have been given medicine by vein to make you sleep during your surgery. This may have included both a pain medicine and sleeping medicine. Most of the effects have worn off. But you may still have some drowsiness for the next 6 to 8 hours.  Home care  Follow these guidelines when you get home:  · For the next 8 hours, you should be watched by a responsible adult. This person should make sure your condition is not getting worse.  · Don't drink any alcohol for the next 24 hours.  · Don't drive, operate dangerous machinery, or make important business or personal decisions during the next 24 hours.  Note: Your healthcare provider may tell you not to take any medicine by mouth for pain or sleep in the next 4 hours. These medicines may react with the medicines you were given in the hospital. This could cause a much stronger response than usual.  Follow-up care  Follow up with your healthcare provider if you are not alert and back to your usual level of activity within 12 hours.  When to seek medical advice  Call your healthcare provider right away if any of these occur:  · Drowsiness gets worse  · Weakness or dizziness gets worse  · Repeated vomiting  · You can't be awakened   Date Last Reviewed: 10/18/2016  © 9027-6168 [employer]. [address] [place] [address], [place] [address]. All rights reserved. This information is not intended as a substitute for professional medical care. Always follow your healthcare professional's instructions.

## 2019-03-14 NOTE — BRIEF OP NOTE
[facility]  Brief Operative Note     SUMMARY     Surgery Date: 3/14/2019     Surgeon(s) and Role:     * [place] DORCAS Magaña III, MD - Primary     * Tiago Osborn MD - Resident - Assisting        Pre-op Diagnosis:  Peripheral visual field defect of both eyes [H53.453]    Post-op Diagnosis:  Post-Op Diagnosis Codes:     * Peripheral visual field defect of both eyes [H53.453]    Procedure(s) (LRB):  BLEPHAROPLASTY (Bilateral)    Anesthesia: General    Description of the findings of the procedure: 1) bilateral upper lid blepharoplasty     Findings/Key Components: see op note    Estimated Blood Loss: * No values recorded between 3/14/2019 11:06 AM and 3/14/2019 12:39 PM *         Specimens:   Specimen (12h ago, onward)    None          Discharge Note    SUMMARY     Admit Date: 3/14/2019    Discharge Date and Time:  03/14/2019 12:41 PM    Hospital Course (synopsis of major diagnoses, care, treatment, and services provided during the course of the hospital stay): Following completion of an electively scheduled procedure, the patient was transferred to the PACU for postoperative monitoring.   Her hospital course was uneventful and noted for adequate pain control and PO intake following surgery.  She is discharged home in good condition and will follow-up with Dr. Magaña as scheduled.          Final Diagnosis: Post-Op Diagnosis Codes:     * Peripheral visual field defect of both eyes [H53.453]    Disposition: Home or Self Care    Follow Up/Patient Instructions:     Medications:  Reconciled Home Medications:      Medication List      START taking these medications    artificial tears with lanolin Oint 0.5% ophthalmic ointment  Commonly known as:  AKWA TEARS  Apply to affected eye(s) before bedtime and as needed for dryness.     cephALEXin 500 MG capsule  Commonly known as:  KEFLEX  Take 1 capsule (500 mg total) by mouth every 12 (twelve) hours. for 7 days     ondansetron 8 MG Tbdl  Commonly known as:   ZOFRAN-ODT  Take 1 tablet (8 mg total) by mouth every 12 (twelve) hours as needed.     oxyCODONE-acetaminophen 5-325 mg per tablet  Commonly known as:  PERCOCET  Take 1 tablet by mouth every 4 (four) hours as needed for Pain.        CONTINUE taking these medications    acyclovir 400 MG tablet  Commonly known as:  ZOVIRAX  Take 1 tablet (400 mg total) by mouth 3 (three) times daily. for 7 days     TYLENOL PM ORAL  Take by mouth.          Discharge Procedure Orders   Diet Adult Regular     Notify your health care provider if you experience any of the following:  persistent nausea and vomiting or diarrhea     Notify your health care provider if you experience any of the following:  severe uncontrolled pain     Notify your health care provider if you experience any of the following:  redness, tenderness, or signs of infection (pain, swelling, redness, odor or green/yellow discharge around incision site)     Leave dressing on - Keep it clean, dry, and intact until clinic visit   Order Comments: Use eye drops often AND before bedtime   Wear eye mask @ night     Activity as tolerated   Order Comments: Light activity only. No heavy lifting, straining, stooping, exercising.

## 2019-03-14 NOTE — TRANSFER OF CARE
"Anesthesia Transfer of Care Note    Patient: Jacek Duran    Procedure(s) Performed: Procedure(s) (LRB):  BLEPHAROPLASTY (Bilateral)    Patient location: [facility]    Anesthesia Type: general    Transport from OR: Transported from OR on room air with adequate spontaneous ventilation    Post pain: adequate analgesia    Post assessment: no apparent anesthetic complications and tolerated procedure well    Post vital signs: stable    Level of consciousness: awake    Nausea/Vomiting: no nausea/vomiting    Complications: none    Transfer of care protocol was followed      Last vitals:   Visit Vitals  /78 (BP Location: Left arm, Patient Position: Lying)   Pulse 73   Temp 36.7 °C (98.1 °F) (Oral)   Resp 18   Ht 5' 6" (1.676 m)   Wt 65.8 kg (145 lb)   LMP 09/30/2012 (LMP Unknown)   SpO2 100%   Breastfeeding? No   BMI 23.40 kg/m²     "

## 2019-03-14 NOTE — ANESTHESIA PREPROCEDURE EVALUATION
03/14/2019  Jacek Duran is a 58 y.o., female.    Anesthesia Evaluation         Review of Systems  Anesthesia Hx:  No problems with previous Anesthesia   Social:  Non-Smoker, Alcohol Use    Cardiovascular:   hyperlipidemia        Physical Exam  General:  Well nourished    Airway/Jaw/Neck:  Airway Findings: Mouth Opening: Normal Tongue: Normal  General Airway Assessment: Adult  Mallampati: II  TM Distance: Normal, at least 6 cm      Dental:  Dental Findings: In tact        Mental Status:  Mental Status Findings:  Alert and Oriented, Cooperative         Anesthesia Plan  Type of Anesthesia, risks & benefits discussed:  Anesthesia Type:  general  Patient's Preference:   Intra-op Monitoring Plan: standard ASA monitors  Intra-op Monitoring Plan Comments:   Post Op Pain Control Plan:   Post Op Pain Control Plan Comments:   Induction:   IV  Beta Blocker:  Patient is not currently on a Beta-Blocker (No further documentation required).       Informed Consent: Patient understands risks and agrees with Anesthesia plan.  Questions answered.   ASA Score: 1     Day of Surgery Review of History & Physical:    H&P update referred to the surgeon.         Ready For Surgery From Anesthesia Perspective.

## 2019-03-14 NOTE — H&P
Ms. Duran presents for consultation.     VITAL SIGNS:  Per nurses' notes.     CHIEF COMPLAINT:  Sagging eyelids with forehead and eye pressure.     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  She has no   other symptoms.  She does have a history of hyperlipidemia.     REVIEW OF SYSTEMS:  CONSTITUTIONAL: Weight loss or weight gain: Negative.  ALLERGY/IMMUNOLOGIC: Negative.  ENT/Mouth:  Hearing Loss/Dizziness/Tinnitus: Negative.  Ear Infections/Otalgia: Negative.  Rhinitis/Sinusitis/Epistaxis: Negative.  Headache/Facial Pain: Negative.  Nasal Obstruction/Snoring/CORTNEY: Negative.  Throat: Infections/Pain: Negative.  Hoarseness/Speech Disturbance: Negative.  Salivary Glands Disorder: Negative.  Trauma: Hx: Negative.     Cardiovascular:  MI/Angina: Negative.  Hypertension: Negative.  Endo: DM/Steroids: Negative.  Eyes: Negative.  GI: Dysphagia/Reflux: Negative.  : GYN Pregnancy: Negative.                Renal: Dialysis: Negative.  Lymph: Neck Mass/Lymphadenopathy: Negative.  Musculoskeletal: Negative.  Hem: Bleeding Disorders/Anemia: Negative.  Neuro: Cranial/Neuralgia: Negative.  Pulm: Asthma/SOB/Cough: Negative.  Skin/Breast: Negative.     PAST MEDICAL/FAMILY/SOCIAL HISTORY:     Past Medical History             ENT Surgery: Negative.             Occupational Exposure: Negative.              Problems: Negative.             Cancer: Negative.             Past surgeries include , breast augmentation, cholecystectomy.     Past Family History             Family history hearing loss: Negative.             Family history cancer: Positive for colon cancer.             Positive for cataracts and heart disease and stroke.     Past Social History             Tobacco: She is a nonsmoker.             Alcohol: Twice a month social  drinker.     MEDICATIONS:  Per MedCard.     ALLERGIES:  Per MedCard.     EXAMINATION:  General Appearance:  Well-developed, well-nourished 58-year-old white female in   no apparent distress.  Communication Ability:  Good.  EARS, NOSE, THROAT, MOUTH;  EARS:             External auditory canals: Clear.             Hearing: Grossly Intact.             Tympanic membranes: Clear.  NOSE:             External: Grossly normal.             Intranasal:  MOUTH:             Intraorally: Lips, teeth and gums: Normal.             Oropharynx: Normal.             Mucosa: Normal.  THROAT:             Tongue: Normal.             Palate: Normal.             Tonsils: Normal.             Posterior pharynx: Normal.  HEAD/FACE INSPECTION: Normal and atraumatic.             Palpation/Percussion:  Non tender.             Facial Strength: Normal and symmetric.             Salivary glands: Normal.     NECK: Supple.  THYROID: No masses.  LYMPHATICS: No nodes.  RESPIRATORY:             Effort: Normal.  EYES: She shows bilateral brow ptosis with relatively deep set eyes.  She does   have some excess skin of the upper lids, right greater than the left.  Goldmann   visual field study, which was obtained six weeks ago, shows her left side   resting field at 20 degrees and corrected to 55 degrees and right resting at 10   degrees and corrected to 45 degrees.  Thus, she would qualify for correction.             Ocular Mobility: Normal.             Vision: Grossly intact.  NEURO/PSYCH:             Cranial nerves: 2-12 grossly intact.             Orientation: x3.             Mood/Affect: Normal.     RECOMMENDATION:  I have discussed my findings with her in detail as well as my   recommendations for treatment.  We have reviewed the anatomy in its entirety.  I   have also shown her in the mirror what we are trying to achieve.  My   recommendation would be for endoscopic brow lift with upper lid blepharoplasty.    We will set this up for her at her  convenience.    _______________________________________________________________________________________________  03/14/2019  9:58 AM    H&P has been reviewed, the patient has been examined and:  I concur with the findings and no changes have occurred since H&P was written.  Plan to proceed with bilateral blepharoplasty  There are no hospital problems to display for this patient.

## 2019-03-15 VITALS
RESPIRATION RATE: 18 BRPM | OXYGEN SATURATION: 96 % | HEART RATE: 92 BPM | WEIGHT: 145 LBS | SYSTOLIC BLOOD PRESSURE: 120 MMHG | DIASTOLIC BLOOD PRESSURE: 57 MMHG | HEIGHT: 66 IN | BODY MASS INDEX: 23.3 KG/M2 | TEMPERATURE: 98 F

## 2019-03-15 NOTE — ANESTHESIA POSTPROCEDURE EVALUATION
"Anesthesia Post Evaluation    Patient: Jacek Duran    Procedure(s) Performed: Procedure(s) (LRB):  BLEPHAROPLASTY (Bilateral)    Final Anesthesia Type: general  Patient location during evaluation: PACU  Patient participation: Yes- Able to Participate  Level of consciousness: awake and alert  Post-procedure vital signs: reviewed and stable  Pain management: adequate  Airway patency: patent  PONV status at discharge: No PONV  Anesthetic complications: no      Cardiovascular status: blood pressure returned to baseline  Respiratory status: unassisted  Hydration status: euvolemic  Follow-up not needed.        Visit Vitals  BP (!) 120/57   Pulse 92   Temp 36.8 °C (98.3 °F) (Temporal)   Resp 18   Ht 5' 6" (1.676 m)   Wt 65.8 kg (145 lb)   LMP 09/30/2012 (LMP Unknown)   SpO2 96%   Breastfeeding? No   BMI 23.40 kg/m²       Pain/Ting Score: Pain Rating Prior to Med Admin: 5 (3/14/2019  1:25 PM)  Ting Score: 9 (3/14/2019 12:40 PM)        "

## 2019-03-20 ENCOUNTER — OFFICE VISIT (OUTPATIENT)
Dept: OTOLARYNGOLOGY | Facility: CLINIC | Age: 59
End: 2019-03-20
Payer: COMMERCIAL

## 2019-03-20 VITALS — HEART RATE: 89 BPM | SYSTOLIC BLOOD PRESSURE: 134 MMHG | DIASTOLIC BLOOD PRESSURE: 83 MMHG

## 2019-03-20 DIAGNOSIS — Z98.890 POST-OPERATIVE STATE: ICD-10-CM

## 2019-03-20 DIAGNOSIS — H53.453 PERIPHERAL VISUAL FIELD DEFECT OF BOTH EYES: Primary | ICD-10-CM

## 2019-03-20 PROCEDURE — 99999 PR PBB SHADOW E&M-EST. PATIENT-LVL III: ICD-10-PCS | Mod: PBBFAC,,, | Performed by: OTOLARYNGOLOGY

## 2019-03-20 PROCEDURE — 99024 POSTOP FOLLOW-UP VISIT: CPT | Mod: S$GLB,,, | Performed by: OTOLARYNGOLOGY

## 2019-03-20 PROCEDURE — 99999 PR PBB SHADOW E&M-EST. PATIENT-LVL III: CPT | Mod: PBBFAC,,, | Performed by: OTOLARYNGOLOGY

## 2019-03-20 PROCEDURE — 99024 PR POST-OP FOLLOW-UP VISIT: ICD-10-PCS | Mod: S$GLB,,, | Performed by: OTOLARYNGOLOGY

## 2019-03-20 NOTE — PROGRESS NOTES
One week S/P upper blepharoplasty for visual field impairment.  Steristrips and sutures removed.  Incisions intact. Healing well.  Moderate bruising.  Vision grossly intact.  Right eye irritation as she said she got soap in it this morning.  Plan: Light duty for 1 week  RTC 3 weeks

## 2019-03-25 ENCOUNTER — LAB VISIT (OUTPATIENT)
Dept: LAB | Facility: HOSPITAL | Age: 59
End: 2019-03-25
Attending: INTERNAL MEDICINE
Payer: COMMERCIAL

## 2019-03-25 DIAGNOSIS — R79.89 ELEVATED LFTS: ICD-10-CM

## 2019-03-25 DIAGNOSIS — E78.5 HYPERLIPIDEMIA, UNSPECIFIED HYPERLIPIDEMIA TYPE: ICD-10-CM

## 2019-03-25 LAB
ALBUMIN SERPL BCP-MCNC: 4.1 G/DL (ref 3.5–5.2)
ALP SERPL-CCNC: 138 U/L (ref 55–135)
ALT SERPL W/O P-5'-P-CCNC: 74 U/L (ref 10–44)
ANION GAP SERPL CALC-SCNC: 8 MMOL/L (ref 8–16)
AST SERPL-CCNC: 35 U/L (ref 10–40)
BILIRUB SERPL-MCNC: 0.5 MG/DL (ref 0.1–1)
BUN SERPL-MCNC: 21 MG/DL (ref 6–20)
CALCIUM SERPL-MCNC: 9.8 MG/DL (ref 8.7–10.5)
CHLORIDE SERPL-SCNC: 107 MMOL/L (ref 95–110)
CHOLEST SERPL-MCNC: 243 MG/DL (ref 120–199)
CHOLEST/HDLC SERPL: 3 {RATIO} (ref 2–5)
CO2 SERPL-SCNC: 26 MMOL/L (ref 23–29)
CREAT SERPL-MCNC: 0.8 MG/DL (ref 0.5–1.4)
EST. GFR  (AFRICAN AMERICAN): >60 ML/MIN/1.73 M^2
EST. GFR  (NON AFRICAN AMERICAN): >60 ML/MIN/1.73 M^2
GLUCOSE SERPL-MCNC: 100 MG/DL (ref 70–110)
HDLC SERPL-MCNC: 82 MG/DL (ref 40–75)
HDLC SERPL: 33.7 % (ref 20–50)
LDLC SERPL CALC-MCNC: 148 MG/DL (ref 63–159)
NONHDLC SERPL-MCNC: 161 MG/DL
POTASSIUM SERPL-SCNC: 3.9 MMOL/L (ref 3.5–5.1)
PROT SERPL-MCNC: 7.9 G/DL (ref 6–8.4)
SODIUM SERPL-SCNC: 141 MMOL/L (ref 136–145)
TRIGL SERPL-MCNC: 65 MG/DL (ref 30–150)

## 2019-03-25 PROCEDURE — 36415 COLL VENOUS BLD VENIPUNCTURE: CPT

## 2019-03-25 PROCEDURE — 80053 COMPREHEN METABOLIC PANEL: CPT

## 2019-03-25 PROCEDURE — 80061 LIPID PANEL: CPT

## 2019-03-29 ENCOUNTER — OFFICE VISIT (OUTPATIENT)
Dept: INTERNAL MEDICINE | Facility: CLINIC | Age: 59
End: 2019-03-29
Payer: COMMERCIAL

## 2019-03-29 VITALS
HEIGHT: 66 IN | HEART RATE: 64 BPM | WEIGHT: 148.38 LBS | BODY MASS INDEX: 23.84 KG/M2 | DIASTOLIC BLOOD PRESSURE: 64 MMHG | SYSTOLIC BLOOD PRESSURE: 120 MMHG | RESPIRATION RATE: 16 BRPM

## 2019-03-29 DIAGNOSIS — Z71.82 EXERCISE COUNSELING: ICD-10-CM

## 2019-03-29 DIAGNOSIS — E78.5 HYPERLIPIDEMIA, UNSPECIFIED HYPERLIPIDEMIA TYPE: Primary | Chronic | ICD-10-CM

## 2019-03-29 PROCEDURE — 99213 OFFICE O/P EST LOW 20 MIN: CPT | Mod: S$GLB,,, | Performed by: INTERNAL MEDICINE

## 2019-03-29 PROCEDURE — 3008F BODY MASS INDEX DOCD: CPT | Mod: CPTII,S$GLB,, | Performed by: INTERNAL MEDICINE

## 2019-03-29 PROCEDURE — 99213 PR OFFICE/OUTPT VISIT, EST, LEVL III, 20-29 MIN: ICD-10-PCS | Mod: S$GLB,,, | Performed by: INTERNAL MEDICINE

## 2019-03-29 PROCEDURE — 99999 PR PBB SHADOW E&M-EST. PATIENT-LVL III: ICD-10-PCS | Mod: PBBFAC,,, | Performed by: INTERNAL MEDICINE

## 2019-03-29 PROCEDURE — 3008F PR BODY MASS INDEX (BMI) DOCUMENTED: ICD-10-PCS | Mod: CPTII,S$GLB,, | Performed by: INTERNAL MEDICINE

## 2019-03-29 PROCEDURE — 99999 PR PBB SHADOW E&M-EST. PATIENT-LVL III: CPT | Mod: PBBFAC,,, | Performed by: INTERNAL MEDICINE

## 2019-04-15 ENCOUNTER — OFFICE VISIT (OUTPATIENT)
Dept: OTOLARYNGOLOGY | Facility: CLINIC | Age: 59
End: 2019-04-15
Payer: COMMERCIAL

## 2019-04-15 VITALS — SYSTOLIC BLOOD PRESSURE: 132 MMHG | DIASTOLIC BLOOD PRESSURE: 83 MMHG | HEART RATE: 75 BPM

## 2019-04-15 DIAGNOSIS — H02.403 PTOSIS OF BOTH EYELIDS: ICD-10-CM

## 2019-04-15 DIAGNOSIS — Z98.890 POST-OPERATIVE STATE: Primary | ICD-10-CM

## 2019-04-15 PROCEDURE — 99999 PR PBB SHADOW E&M-EST. PATIENT-LVL III: ICD-10-PCS | Mod: PBBFAC,,, | Performed by: OTOLARYNGOLOGY

## 2019-04-15 PROCEDURE — 99999 PR PBB SHADOW E&M-EST. PATIENT-LVL III: CPT | Mod: PBBFAC,,, | Performed by: OTOLARYNGOLOGY

## 2019-04-15 PROCEDURE — 99024 PR POST-OP FOLLOW-UP VISIT: ICD-10-PCS | Mod: S$GLB,,, | Performed by: OTOLARYNGOLOGY

## 2019-04-15 PROCEDURE — 99024 POSTOP FOLLOW-UP VISIT: CPT | Mod: S$GLB,,, | Performed by: OTOLARYNGOLOGY

## 2019-04-15 NOTE — PROGRESS NOTES
One month S/P upper blepharoplasty for visual field impairment.  She feels better lid  left than right.  Incisions intact. Healing well.  Slight swelling over right upper lid.  Good lid closer.   Vision grossly intact.  Plan:   RTC 2 months

## 2019-05-14 ENCOUNTER — TELEPHONE (OUTPATIENT)
Dept: OTOLARYNGOLOGY | Facility: CLINIC | Age: 59
End: 2019-05-14

## 2019-06-06 ENCOUNTER — TELEPHONE (OUTPATIENT)
Dept: OTOLARYNGOLOGY | Facility: CLINIC | Age: 59
End: 2019-06-06

## 2019-06-23 ENCOUNTER — PATIENT MESSAGE (OUTPATIENT)
Dept: OTOLARYNGOLOGY | Facility: CLINIC | Age: 59
End: 2019-06-23

## 2019-06-26 ENCOUNTER — OFFICE VISIT (OUTPATIENT)
Dept: OTOLARYNGOLOGY | Facility: CLINIC | Age: 59
End: 2019-06-26
Payer: COMMERCIAL

## 2019-06-26 VITALS — HEART RATE: 67 BPM | SYSTOLIC BLOOD PRESSURE: 119 MMHG | DIASTOLIC BLOOD PRESSURE: 74 MMHG

## 2019-06-26 DIAGNOSIS — Z98.890 POST-OPERATIVE STATE: ICD-10-CM

## 2019-06-26 DIAGNOSIS — H53.453 PERIPHERAL VISUAL FIELD DEFECT OF BOTH EYES: Primary | ICD-10-CM

## 2019-06-26 PROCEDURE — 99999 PR PBB SHADOW E&M-EST. PATIENT-LVL III: CPT | Mod: PBBFAC,,, | Performed by: OTOLARYNGOLOGY

## 2019-06-26 PROCEDURE — 99999 PR PBB SHADOW E&M-EST. PATIENT-LVL III: ICD-10-PCS | Mod: PBBFAC,,, | Performed by: OTOLARYNGOLOGY

## 2019-06-26 PROCEDURE — 99024 POSTOP FOLLOW-UP VISIT: CPT | Mod: S$GLB,,, | Performed by: OTOLARYNGOLOGY

## 2019-06-26 PROCEDURE — 99024 PR POST-OP FOLLOW-UP VISIT: ICD-10-PCS | Mod: S$GLB,,, | Performed by: OTOLARYNGOLOGY

## 2019-06-26 NOTE — PROGRESS NOTES
Three months S/P upper blepharoplasty for visual field impairment.  She has healed well and is happy with result.  Feels her vision is much improved.  She had one small inclusion cyst of right upper lid adjacent to incision line.  She unroofed this herself.  Plan: RTC 9 months for F/U or sooner if cyst returns.

## 2019-07-08 ENCOUNTER — TELEPHONE (OUTPATIENT)
Dept: ENDOSCOPY | Facility: HOSPITAL | Age: 59
End: 2019-07-08

## 2019-07-08 NOTE — TELEPHONE ENCOUNTER
----- Message from Antony Taveras MD sent at 7/7/2019  2:17 PM CDT -----  Can you ask her if La[place] is ok? othersie I can add her on at the end of the day Wed or Thursday  ----- Message -----  From: Kristie Dunn MA  Sent: 7/5/2019   3:43 PM  To: Antony Taveras MD    Do you want me to schedule a spot after your procedures on day next week? Or add on as an extra patient on 7/18 at [facility]?  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    Is there spot you could put her. Not sure if [facility] would be better  Thanks

## 2019-07-09 ENCOUNTER — TELEPHONE (OUTPATIENT)
Dept: ENDOSCOPY | Facility: HOSPITAL | Age: 59
End: 2019-07-09

## 2019-07-09 NOTE — TELEPHONE ENCOUNTER
----- Message from Grazyna Dueñas sent at 7/9/2019  2:44 PM CDT -----  Contact: Self- [phone]  Nitin- pt states she is returning a missed call in regards to scheduling an appt- please contact pt at [phone]

## 2019-07-09 NOTE — TELEPHONE ENCOUNTER
----- Message from Grazyna Dueñas sent at 7/9/2019  8:09 AM CDT -----  Contact: Self- ext [phone]  Nitin- pt states she is returning a missed call in regards to scheduling an appt- please contact pt at ext [phone]

## 2019-07-11 ENCOUNTER — TELEPHONE (OUTPATIENT)
Dept: GASTROENTEROLOGY | Facility: CLINIC | Age: 59
End: 2019-07-11

## 2019-07-18 ENCOUNTER — TELEPHONE (OUTPATIENT)
Dept: OBSTETRICS AND GYNECOLOGY | Facility: CLINIC | Age: 59
End: 2019-07-18

## 2019-07-18 NOTE — TELEPHONE ENCOUNTER
----- Message from Shea Jensen sent at 7/18/2019  8:05 AM CDT -----  Contact: [facility]/[place]/[phone]  They need a due date on this patient.

## 2019-07-18 NOTE — TELEPHONE ENCOUNTER
I tried calling number that was left in the message. Number is incorrect and patient is not pregnant.

## 2019-07-31 ENCOUNTER — TELEPHONE (OUTPATIENT)
Dept: GASTROENTEROLOGY | Facility: CLINIC | Age: 59
End: 2019-07-31

## 2019-07-31 DIAGNOSIS — R79.89 ABNORMAL LIVER FUNCTION TEST: Primary | ICD-10-CM

## 2019-07-31 NOTE — TELEPHONE ENCOUNTER
I spoke with Ms Duran over the phone and we discussed her abdominal pain and elevated liver tests.    Her LFTs have been a bit mixed and she has persistent pain at her right upper quadrant. She hasn't had her LFT's checked since March.  She's on no medications, but thinks she may have been exercising heavily around the time of her LFT's    Her Hep c was negative in 2017    We'll start with repeating her liver tests and decide whether she needs an EUS.

## 2019-08-01 ENCOUNTER — PATIENT MESSAGE (OUTPATIENT)
Dept: GASTROENTEROLOGY | Facility: CLINIC | Age: 59
End: 2019-08-01

## 2019-08-01 ENCOUNTER — TELEPHONE (OUTPATIENT)
Dept: ENDOSCOPY | Facility: HOSPITAL | Age: 59
End: 2019-08-01

## 2019-08-01 DIAGNOSIS — R93.89 ABNORMAL FINDING ON IMAGING: Primary | ICD-10-CM

## 2019-08-01 NOTE — TELEPHONE ENCOUNTER
----- Message from Antony Taveras MD sent at 8/1/2019  2:24 PM CDT -----  Can you set up an EUS +/- ERCP for her with me , the week I get back from [place] Aug 12th at [facility]

## 2019-08-06 ENCOUNTER — TELEPHONE (OUTPATIENT)
Dept: ENDOSCOPY | Facility: HOSPITAL | Age: 59
End: 2019-08-06

## 2019-08-06 NOTE — TELEPHONE ENCOUNTER
Spoke with patient. EUS/ERCP scheduled for 8/16 at . Reviewed prep instructions. Ms Duran verbalized understanding.

## 2019-08-06 NOTE — TELEPHONE ENCOUNTER
Spoke with patient about instructions for UEUS/ERCP scheduled 8/16/19 at 1400.  Instructions emailed.

## 2019-08-16 ENCOUNTER — ANESTHESIA (OUTPATIENT)
Dept: ENDOSCOPY | Facility: HOSPITAL | Age: 59
End: 2019-08-16
Payer: COMMERCIAL

## 2019-08-16 ENCOUNTER — HOSPITAL ENCOUNTER (OUTPATIENT)
Facility: HOSPITAL | Age: 59
Discharge: HOME | End: 2019-08-16
Attending: INTERNAL MEDICINE | Admitting: INTERNAL MEDICINE
Payer: COMMERCIAL

## 2019-08-16 ENCOUNTER — ANESTHESIA EVENT (OUTPATIENT)
Dept: ENDOSCOPY | Facility: HOSPITAL | Age: 59
End: 2019-08-16
Payer: COMMERCIAL

## 2019-08-16 VITALS
SYSTOLIC BLOOD PRESSURE: 121 MMHG | BODY MASS INDEX: 24.11 KG/M2 | TEMPERATURE: 98 F | HEART RATE: 71 BPM | OXYGEN SATURATION: 100 % | WEIGHT: 150 LBS | DIASTOLIC BLOOD PRESSURE: 56 MMHG | RESPIRATION RATE: 16 BRPM | HEIGHT: 66 IN

## 2019-08-16 DIAGNOSIS — R79.89 ABNORMAL LIVER FUNCTION TESTS: ICD-10-CM

## 2019-08-16 PROCEDURE — 43239 EGD BIOPSY SINGLE/MULTIPLE: CPT | Mod: 59,,, | Performed by: INTERNAL MEDICINE

## 2019-08-16 PROCEDURE — 37000009 HC ANESTHESIA EA ADD 15 MINS: Performed by: INTERNAL MEDICINE

## 2019-08-16 PROCEDURE — D9220A PRA ANESTHESIA: Mod: CRNA,,, | Performed by: NURSE ANESTHETIST, CERTIFIED REGISTERED

## 2019-08-16 PROCEDURE — 88342 TISSUE SPECIMEN TO PATHOLOGY - SURGERY: ICD-10-PCS | Mod: 26,,, | Performed by: PATHOLOGY

## 2019-08-16 PROCEDURE — 25000003 PHARM REV CODE 250: Performed by: NURSE ANESTHETIST, CERTIFIED REGISTERED

## 2019-08-16 PROCEDURE — 88305 TISSUE SPECIMEN TO PATHOLOGY - SURGERY: ICD-10-PCS | Mod: 26,,, | Performed by: PATHOLOGY

## 2019-08-16 PROCEDURE — 63600175 PHARM REV CODE 636 W HCPCS: Performed by: INTERNAL MEDICINE

## 2019-08-16 PROCEDURE — D9220A PRA ANESTHESIA: ICD-10-PCS | Mod: CRNA,,, | Performed by: NURSE ANESTHETIST, CERTIFIED REGISTERED

## 2019-08-16 PROCEDURE — 43239 EGD BIOPSY SINGLE/MULTIPLE: CPT | Performed by: INTERNAL MEDICINE

## 2019-08-16 PROCEDURE — 43259 PR ENDOSCOPIC ULTRASOUND EXAM: ICD-10-PCS | Mod: ,,, | Performed by: INTERNAL MEDICINE

## 2019-08-16 PROCEDURE — D9220A PRA ANESTHESIA: Mod: ANES,,, | Performed by: ANESTHESIOLOGY

## 2019-08-16 PROCEDURE — 88342 IMHCHEM/IMCYTCHM 1ST ANTB: CPT | Mod: 26,,, | Performed by: PATHOLOGY

## 2019-08-16 PROCEDURE — 43239 PR EGD, FLEX, W/BIOPSY, SGL/MULTI: ICD-10-PCS | Mod: 59,,, | Performed by: INTERNAL MEDICINE

## 2019-08-16 PROCEDURE — 88305 TISSUE EXAM BY PATHOLOGIST: CPT | Performed by: PATHOLOGY

## 2019-08-16 PROCEDURE — 63600175 PHARM REV CODE 636 W HCPCS: Performed by: NURSE ANESTHETIST, CERTIFIED REGISTERED

## 2019-08-16 PROCEDURE — D9220A PRA ANESTHESIA: ICD-10-PCS | Mod: ANES,,, | Performed by: ANESTHESIOLOGY

## 2019-08-16 PROCEDURE — 37000008 HC ANESTHESIA 1ST 15 MINUTES: Performed by: INTERNAL MEDICINE

## 2019-08-16 PROCEDURE — 27201012 HC FORCEPS, HOT/COLD, DISP: Performed by: INTERNAL MEDICINE

## 2019-08-16 PROCEDURE — 43259 EGD US EXAM DUODENUM/JEJUNUM: CPT | Performed by: INTERNAL MEDICINE

## 2019-08-16 PROCEDURE — 43259 EGD US EXAM DUODENUM/JEJUNUM: CPT | Mod: ,,, | Performed by: INTERNAL MEDICINE

## 2019-08-16 RX ORDER — PROPOFOL 10 MG/ML
VIAL (ML) INTRAVENOUS
Status: DISCONTINUED | OUTPATIENT
Start: 2019-08-16 | End: 2019-08-16

## 2019-08-16 RX ORDER — LIDOCAINE HCL/PF 100 MG/5ML
SYRINGE (ML) INTRAVENOUS
Status: DISCONTINUED | OUTPATIENT
Start: 2019-08-16 | End: 2019-08-16

## 2019-08-16 RX ORDER — SODIUM CHLORIDE 0.9 % (FLUSH) 0.9 %
3 SYRINGE (ML) INJECTION
Status: DISCONTINUED | OUTPATIENT
Start: 2019-08-16 | End: 2019-08-16 | Stop reason: HOSPADM

## 2019-08-16 RX ORDER — ONDANSETRON 2 MG/ML
INJECTION INTRAMUSCULAR; INTRAVENOUS
Status: DISCONTINUED | OUTPATIENT
Start: 2019-08-16 | End: 2019-08-16

## 2019-08-16 RX ORDER — SODIUM CHLORIDE 9 MG/ML
INJECTION, SOLUTION INTRAVENOUS CONTINUOUS
Status: DISCONTINUED | OUTPATIENT
Start: 2019-08-16 | End: 2019-08-16 | Stop reason: HOSPADM

## 2019-08-16 RX ORDER — PROPOFOL 10 MG/ML
VIAL (ML) INTRAVENOUS CONTINUOUS PRN
Status: DISCONTINUED | OUTPATIENT
Start: 2019-08-16 | End: 2019-08-16

## 2019-08-16 RX ORDER — SODIUM CHLORIDE 0.9 % (FLUSH) 0.9 %
10 SYRINGE (ML) INJECTION
Status: DISCONTINUED | OUTPATIENT
Start: 2019-08-16 | End: 2019-08-16 | Stop reason: HOSPADM

## 2019-08-16 RX ORDER — FENTANYL CITRATE 50 UG/ML
INJECTION, SOLUTION INTRAMUSCULAR; INTRAVENOUS
Status: DISCONTINUED | OUTPATIENT
Start: 2019-08-16 | End: 2019-08-16

## 2019-08-16 RX ORDER — MIDAZOLAM HYDROCHLORIDE 1 MG/ML
INJECTION, SOLUTION INTRAMUSCULAR; INTRAVENOUS
Status: DISCONTINUED | OUTPATIENT
Start: 2019-08-16 | End: 2019-08-16

## 2019-08-16 RX ADMIN — SODIUM CHLORIDE: 0.9 INJECTION, SOLUTION INTRAVENOUS at 01:08

## 2019-08-16 RX ADMIN — PROPOFOL 50 MG: 10 INJECTION, EMULSION INTRAVENOUS at 02:08

## 2019-08-16 RX ADMIN — TOPICAL ANESTHETIC 1 EACH: 200 SPRAY DENTAL; PERIODONTAL at 02:08

## 2019-08-16 RX ADMIN — MIDAZOLAM HYDROCHLORIDE 2 MG: 1 INJECTION, SOLUTION INTRAMUSCULAR; INTRAVENOUS at 02:08

## 2019-08-16 RX ADMIN — PROPOFOL 150 MCG/KG/MIN: 10 INJECTION, EMULSION INTRAVENOUS at 02:08

## 2019-08-16 RX ADMIN — FENTANYL CITRATE 50 MCG: 50 INJECTION, SOLUTION INTRAMUSCULAR; INTRAVENOUS at 02:08

## 2019-08-16 RX ADMIN — LIDOCAINE HYDROCHLORIDE 20 MG: 20 INJECTION, SOLUTION INTRAVENOUS at 02:08

## 2019-08-16 RX ADMIN — ONDANSETRON 4 MG: 2 INJECTION INTRAMUSCULAR; INTRAVENOUS at 02:08

## 2019-08-16 NOTE — TRANSFER OF CARE
"Anesthesia Transfer of Care Note    Patient: Jacek Duran    Procedure(s) Performed: Procedure(s) (LRB):  ULTRASOUND, UPPER GI TRACT, ENDOSCOPIC (N/A)    Patient location: [facility]    Anesthesia Type: general    Transport from OR: Transported from OR on room air with adequate spontaneous ventilation    Post pain: adequate analgesia    Post assessment: no apparent anesthetic complications and tolerated procedure well    Post vital signs: stable    Level of consciousness: awake and sedated    Nausea/Vomiting: no nausea/vomiting    Complications: none    Transfer of care protocol was followed      Last vitals:   Visit Vitals  /69   Pulse 74   Temp 37.1 °C (98.7 °F) (Oral)   Resp 16   Ht 5' 6" (1.676 m)   Wt 68 kg (150 lb)   LMP 09/30/2012 (LMP Unknown)   SpO2 99%   Breastfeeding? No   BMI 24.21 kg/m²     "

## 2019-08-16 NOTE — DISCHARGE INSTRUCTIONS
Endoscopic Ultrasound (EUS)    An endoscopic ultrasound (EUS) is a test to look at the inside of your gastrointestinal (GI) tract. It's commonly used to look for cancers or growths in the esophagus, stomach, pancreas, liver, and rectum. It can help to stage cancer (see how advanced a cancer is). EUS may also be used to help diagnose certain diseases or to drain cysts or abscesses.  What is EUS?  EUS shows both ultrasound images and live video of the GI tract. During the test, a flexible tube called an endoscope (scope) is used. At the end of the scope is a tiny video camera and light. The video camera sends live images to a monitor. The scope also contains a very small ultrasound device. This uses sound waves to create images and send them to a monitor.  A needle is passed through the scope. The needle can be used take a small sample of tissue for testing. This is called a biopsy. The needle can be used to take a sample of fluid. This is called fine-needle aspiration (FNA).  Risks and possible complications of EUS  Risks and possible complications include the following:  · Bleeding  · Infection  · A perforation (hole) in the digestive tract   · Risks of sedation or anesthesia   Before the test  Be prepared prior to the test:  · Tell your healthcare provider what medicine you take. This includes vitamins, herbs, and over-the-counter medicine. It also includes any blood thinners, such as warfarin, clopidogrel, ibuprofen, or daily aspirin. Ask your healthcare provider if you need to stop taking some or all of them before the test.  · You may be prescribed antibiotics to take before or after the test. This depends on the area being studied and what is done during the test. These medicines help prevent infection.  · Carefully follow the instructions for preparing for the test to make sure results are accurate. Instructions may include:  ¨ If youre having an EUS of the upper GI tract (esophagus, stomach, duodenum,  pancreas, liver):  § Do not eat or drink for 6 hours before the test.  ¨ If youre having an EUS of the lower GI tract (rectum):  § Before the test, do bowel prep as instructed to clean your rectum of stool. This may involve a clear liquid diet and using a laxative (liquid or pills) the night before the test. Or it may mean doing one or more enemas the morning of the test.  § Do not eat or drink for 6 hours before the test.  · Be sure to arrive on time at the facility. Bring your identification and health insurance card. Leave valuables at home. If you have them, bring X-rays or other test results with you.  Let the healthcare provider know  For your safety, tell the healthcare provider if you:  · Take insulin. Your dose may need to be changed on the day of your test.  · Are allergic to latex.  · Have any other allergies.  · Are taking blood thinners.   During the test  An endoscopic ultrasound usually takes place in a hospital. The procedure itself may take 1 to 2 hours. You will likely go home soon afterward. During the test:  · You lie on your left side on an exam table.  · An intravenous (IV) line will be put into a vein in your arm or hand. This line supplies fluids and medicines. To keep you comfortable during the test, you will be given a sedative medicine. This medicine prevents discomfort and will make you sleepy.  · If you are having an EUS of the upper GI tract, local anesthetic may be sprayed in your throat. This will help you be more comfortable as the healthcare provider inserts the scope. The healthcare provider then gently puts the flexible scope into your mouth or nose and down your throat.  · If youre having an EUS of the lower GI tract, the healthcare provider gently puts the flexible scope into your anus.  · During the test, the scope sends live video and ultrasound images from inside your body to nearby monitors. These are used to examine your GI tract. Specialized procedures, such as drainage,  are done as needed.  · The healthcare provider may discuss the results with you soon after the test. Biopsy results take several  days.  · In most cases, you can go home within a few hours of the test. When you leave the facility, have an adult family member or friend drive you, even if you don't feel that sleepy.  After the test  Here is what to expect after the test:  · You may feel tired from the sedative. This should wear off by the end of the day.  · If you had an upper digestive endoscopy, your throat may feel sore for a day or two. Over-the-counter sore throat lozenges and spray should help.  · You can eat and drink normally as soon as the test is done.  When to call the healthcare provider  Call your healthcare provider if you notice any of the following:  · Fever of 100.4°F (38.0°C) or higher, or as advised by your healthcare provider  · Shortness of breath  · Vomiting blood, blood in stool, or black stools  · Coughing or hoarse voice that wont go away   Date Last Reviewed: 7/1/2016  © 4226-8348 [employer]. [address] [place] [address]. All rights reserved. This information is not intended as a substitute for professional medical care. Always follow your healthcare professional's instructions.

## 2019-08-16 NOTE — H&P
Short Stay Endoscopy History and Physical    PCP - Naren Hernandez MD  Referring Physician - Antony Taveras MD  [address] [facility]  [address]  [place] [address]    Procedure - eus  ASA - per anesthesia  Mallampati - per anesthesia  History of Anesthesia problems - no  Family history Anesthesia problems -  no   Plan of anesthesia - General    HPI:  This is a 59 y.o. female here for evaluation of: abdominal pain    Reflux - no  Dysphagia - no  Abdominal pain - no  Diarrhea - no    ROS:  Constitutional: No fevers, chills, No weight loss  CV: No chest pain  Pulm: No cough, No shortness of breath  Ophtho: No vision changes  GI: see HPI  Derm: No rash    Medical History:  has a past medical history of Abnormal Pap smear, Allergy, Hyperlipidemia, and Mild vitamin D deficiency.    Surgical History:  has a past surgical history that includes  section, low transverse; breast augmentation; Cholecystectomy; Augmentation of breast; and Blepharoplasty (Bilateral, 3/14/2019).    Family History: family history includes Cataracts in her father and mother; Colon cancer in her paternal uncle; Diabetes in her mother; Heart disease in her father and paternal uncle; Hyperlipidemia in her mother; Hypertension in her mother; Kidney disease in her mother; Stroke in her maternal grandmother..    Social History:  reports that she has never smoked. She has never used smokeless tobacco. She reports that she drinks alcohol. She reports that she does not use drugs.    Review of patient's allergies indicates:  No Known Allergies    Medications:   Medications Prior to Admission   Medication Sig Dispense Refill Last Dose    acetaminophen/diphenhydramine (TYLENOL PM ORAL) Take by mouth.   More than a month at Unknown time    acyclovir (ZOVIRAX) 400 MG tablet Take 1 tablet (400 mg total) by mouth 3 (three) times daily. for 7 days 21 tablet 0 More than a month at Unknown time       Physical Exam:    Vital Signs:   Vitals:    19 1327    BP: 126/69   Pulse:    Resp:    Temp:        General Appearance: Well appearing in no acute distress    Labs:  Lab Results   Component Value Date    WBC 5.21 03/12/2019    HGB 12.5 03/12/2019    HCT 38.8 03/12/2019     03/12/2019    CHOL 243 (H) 03/25/2019    TRIG 65 03/25/2019    HDL 82 (H) 03/25/2019    ALT 92 (H) 07/31/2019    AST 52 (H) 07/31/2019     03/25/2019    K 3.9 03/25/2019     03/25/2019    CREATININE 0.8 03/25/2019    BUN 21 (H) 03/25/2019    CO2 26 03/25/2019    TSH 2.254 12/21/2018    INR 0.9 05/25/2006    HGBA1C 5.2 12/21/2018       I have explained the risks and benefits of this endoscopic procedure to the patient including but not limited to bleeding, inflammation, infection, perforation, and death.      Antony Taveras MD

## 2019-08-16 NOTE — PROVATION PATIENT INSTRUCTIONS
Discharge Summary/Instructions after an Endoscopic Procedure  Patient Name: Jacek Duran  Patient MRN: [id number]  Patient YOB: 1960 Friday, August 16, 2019  Antony Taveras MD  RESTRICTIONS:  During your procedure today, you received medications for sedation.  These   medications may affect your judgment, balance and coordination.  Therefore,   for 24 hours, you have the following restrictions:   - DO NOT drive a car, operate machinery, make legal/financial decisions,   sign important papers or drink alcohol.    ACTIVITY:  Today: no heavy lifting, straining or running due to procedural   sedation/anesthesia.  The following day: return to full activity including work.  DIET:  Eat and drink normally unless instructed otherwise.     TREATMENT FOR COMMON SIDE EFFECTS:  - Mild abdominal pain, nausea, belching, bloating or excessive gas:  rest,   eat lightly and use a heating pad.  - Sore Throat: treat with throat lozenges and/or gargle with warm salt   water.  - Because air was used during the procedure, expelling large amounts of air   from your rectum or belching is normal.  - If a bowel prep was taken, you may not have a bowel movement for 1-3 days.    This is normal.  SYMPTOMS TO WATCH FOR AND REPORT TO YOUR PHYSICIAN:  1. Abdominal pain or bloating, other than gas cramps.  2. Chest pain.  3. Back pain.  4. Signs of infection such as: chills or fever occurring within 24 hours   after the procedure.  5. Rectal bleeding, which would show as bright red, maroon, or black stools.   (A tablespoon of blood from the rectum is not serious, especially if   hemorrhoids are present.)  6. Vomiting.  7. Weakness or dizziness.  GO DIRECTLY TO THE NEAREST EMERGENCY ROOM IF YOU HAVE ANY OF THE FOLLOWING:      Difficulty breathing              Chills and/or fever over 101 F   Persistent vomiting and/or vomiting blood   Severe abdominal pain   Severe chest pain   Black, tarry stools   Bleeding- more than one tablespoon   Any  other symptom or condition that you feel may need urgent attention  Your doctor recommends these additional instructions:  If any biopsies were taken, your doctors clinic will contact you in 1 to 2   weeks with any results.  - Await path results.   - Resume previous diet.   - Continue present medications.   - Consider referral to hepatology for elevated liver function tests  - Discharge patient to home.  For questions, problems or results please call your physician - Antony Taveras MD at Work:  [phone].  [facility], EMERGENCY ROOM PHONE NUMBER: [phone]  IF A COMPLICATION OR EMERGENCY SITUATION ARISES AND YOU ARE UNABLE TO REACH   YOUR PHYSICIAN - GO DIRECTLY TO THE EMERGENCY ROOM.  Antony Taveras MD  8/16/2019 3:01:24 PM  This report has been verified and signed electronically.  [employer]

## 2019-08-16 NOTE — PLAN OF CARE
Plan of care discussed with pt and her friend, Virginie. Understanding verbalized. Pt states can discuss results with her friend, Virginie

## 2019-08-16 NOTE — PLAN OF CARE
Patient tolerated procedure well.  VSS, no complaints of pain, tolerating po.  AVS reviewed with pt at bedside, who verbalized understanding of all recovery and follow up.  Dr. Taveras met with pt at bedside. IV removed prior to departure.

## 2019-08-19 ENCOUNTER — PATIENT MESSAGE (OUTPATIENT)
Dept: INTERNAL MEDICINE | Facility: CLINIC | Age: 59
End: 2019-08-19

## 2019-08-20 NOTE — ANESTHESIA POSTPROCEDURE EVALUATION
Anesthesia Post Evaluation    Patient: Jacek Druan    Procedure(s) Performed: Procedure(s) (LRB):  ULTRASOUND, UPPER GI TRACT, ENDOSCOPIC (N/A)    Final Anesthesia Type: general  Patient location during evaluation: PACU  Patient participation: Yes- Able to Participate  Level of consciousness: awake and alert  Pain management: adequate  Airway patency: patent  PONV status at discharge: No PONV  Anesthetic complications: no      Cardiovascular status: blood pressure returned to baseline  Respiratory status: unassisted, spontaneous ventilation and room air  Hydration status: euvolemic  Follow-up not needed.          Vitals Value Taken Time   /56 8/16/2019  3:30 PM   Temp 36.4 °C (97.5 °F) 8/16/2019  3:00 PM   Pulse 71 8/16/2019  3:30 PM   Resp 16 8/16/2019  3:30 PM   SpO2 100 % 8/16/2019  3:30 PM         No case tracking events are documented in the log.      Pain/Ting Score: No data recorded

## 2019-08-20 NOTE — ANESTHESIA PREPROCEDURE EVALUATION
08/20/2019  Jacek Duran is a 59 y.o., female.    Anesthesia Evaluation    I have reviewed the Patient Summary Reports.     I have reviewed the Medications.     Review of Systems  Anesthesia Hx:  History of prior surgery of interest to airway management or planning:  Denies Personal Hx of Anesthesia complications.   Social:  Non-Smoker, No Alcohol Use    Hematology/Oncology:  Hematology Normal   Oncology Normal     EENT/Dental:EENT/Dental Normal   Cardiovascular:  Cardiovascular Normal Exercise tolerance: good     Pulmonary:  Pulmonary Normal    Renal/:  Renal/ Normal     Hepatic/GI:  Hepatic/GI Normal    Musculoskeletal:  Musculoskeletal Normal    Neurological:  Neurology Normal    Dermatological:  Skin Normal    Psych:  Psychiatric Normal           Physical Exam  General:  Well nourished    Airway/Jaw/Neck:  Airway Findings: Mouth Opening: Normal Tongue: Normal  General Airway Assessment: Adult, Good  Mallampati: II  TM Distance: Normal, at least 6 cm     Eyes/Ears/Nose:  EYES/EARS/NOSE FINDINGS: Normal   Dental:  Dental Findings: In tact   Chest/Lungs:  Chest/Lungs Findings: Clear to auscultation, Normal Respiratory Rate     Heart/Vascular:  Heart Findings: Rate: Normal  Rhythm: Regular Rhythm  Sounds: Normal  Heart murmur: negative       Mental Status:  Mental Status Findings:  Cooperative, Alert and Oriented         Anesthesia Plan  Type of Anesthesia, risks & benefits discussed:  Anesthesia Type:  general  Patient's Preference:   Intra-op Monitoring Plan:   Intra-op Monitoring Plan Comments:   Post Op Pain Control Plan:   Post Op Pain Control Plan Comments:   Induction:   IV  Beta Blocker:  Patient is not currently on a Beta-Blocker (No further documentation required).       Informed Consent: Patient understands risks and agrees with Anesthesia plan.  Questions answered. Anesthesia consent signed  with patient.  ASA Score: 1     Day of Surgery Review of History & Physical:    H&P update referred to the provider.     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

## 2019-08-22 ENCOUNTER — TELEPHONE (OUTPATIENT)
Dept: ENDOSCOPY | Facility: HOSPITAL | Age: 59
End: 2019-08-22

## 2019-08-22 DIAGNOSIS — R79.89 ABNORMAL LFTS: Primary | ICD-10-CM

## 2019-08-22 NOTE — TELEPHONE ENCOUNTER
----- Message from Antony Taveras MD sent at 8/22/2019  9:00 AM CDT -----  Can you set her up with hepatology   re: abnormal lifts'

## 2019-08-23 ENCOUNTER — TELEPHONE (OUTPATIENT)
Dept: HEPATOLOGY | Facility: CLINIC | Age: 59
End: 2019-08-23

## 2019-08-23 NOTE — TELEPHONE ENCOUNTER
----- Message from Kristie Dunn MA sent at 8/22/2019  2:25 PM CDT -----  Please schedule  ----- Message -----  From: Antony Taveras MD  Sent: 8/22/2019   9:00 AM  To: Kristie Dunn MA    Can you set her up with hepatology   re: abnormal lifts'

## 2019-08-28 ENCOUNTER — OFFICE VISIT (OUTPATIENT)
Dept: HEPATOLOGY | Facility: CLINIC | Age: 59
End: 2019-08-28
Payer: COMMERCIAL

## 2019-08-28 ENCOUNTER — PROCEDURE VISIT (OUTPATIENT)
Dept: HEPATOLOGY | Facility: CLINIC | Age: 59
End: 2019-08-28
Attending: NURSE PRACTITIONER
Payer: COMMERCIAL

## 2019-08-28 VITALS
OXYGEN SATURATION: 99 % | BODY MASS INDEX: 25.26 KG/M2 | SYSTOLIC BLOOD PRESSURE: 128 MMHG | RESPIRATION RATE: 18 BRPM | HEIGHT: 66 IN | DIASTOLIC BLOOD PRESSURE: 64 MMHG | HEART RATE: 64 BPM | WEIGHT: 157.19 LBS

## 2019-08-28 DIAGNOSIS — E78.5 HYPERLIPIDEMIA, UNSPECIFIED HYPERLIPIDEMIA TYPE: Chronic | ICD-10-CM

## 2019-08-28 DIAGNOSIS — R74.8 ELEVATED LIVER ENZYMES: ICD-10-CM

## 2019-08-28 DIAGNOSIS — R74.8 ELEVATED LIVER ENZYMES: Primary | ICD-10-CM

## 2019-08-28 DIAGNOSIS — K76.0 NAFLD (NONALCOHOLIC FATTY LIVER DISEASE): ICD-10-CM

## 2019-08-28 PROCEDURE — 3008F PR BODY MASS INDEX (BMI) DOCUMENTED: ICD-10-PCS | Mod: CPTII,S$GLB,, | Performed by: NURSE PRACTITIONER

## 2019-08-28 PROCEDURE — 91200 LIVER ELASTOGRAPHY: CPT | Mod: S$GLB,,, | Performed by: NURSE PRACTITIONER

## 2019-08-28 PROCEDURE — 91200 PR LIVER ELASTOGRAPHY W/OUT IMAG W/INTERP & REPORT: ICD-10-PCS | Mod: S$GLB,,, | Performed by: NURSE PRACTITIONER

## 2019-08-28 PROCEDURE — 99244 OFF/OP CNSLTJ NEW/EST MOD 40: CPT | Mod: S$GLB,,, | Performed by: NURSE PRACTITIONER

## 2019-08-28 PROCEDURE — 99999 PR PBB SHADOW E&M-EST. PATIENT-LVL IV: ICD-10-PCS | Mod: PBBFAC,,, | Performed by: NURSE PRACTITIONER

## 2019-08-28 PROCEDURE — 3008F BODY MASS INDEX DOCD: CPT | Mod: CPTII,S$GLB,, | Performed by: NURSE PRACTITIONER

## 2019-08-28 PROCEDURE — 99244 PR OFFICE CONSULTATION,LEVEL IV: ICD-10-PCS | Mod: S$GLB,,, | Performed by: NURSE PRACTITIONER

## 2019-08-28 PROCEDURE — 99999 PR PBB SHADOW E&M-EST. PATIENT-LVL IV: CPT | Mod: PBBFAC,,, | Performed by: NURSE PRACTITIONER

## 2019-08-28 RX ORDER — ERGOCALCIFEROL 1.25 MG/1
1000 CAPSULE ORAL DAILY
COMMUNITY
End: 2022-05-12

## 2019-08-28 NOTE — PROCEDURES
Vibration-controlled Transient Elastography Procedure (Fibroscan)    Name: Jacek Duran  Date of Procedure : 2019   :: Madhuri Weeks NP  Diagnosis: NAFLD    Probe: M    Findings  Median liver stiffness score: 7.2 KPa  CAP readin dB/m    IQR/med: 10 %    Interpretation  Fibrosis interpretation is based on medial liver stiffness - Kilopascal (kPa).     Fibrosis stage: F 0-1     Steatosis interpretation is based on controlled attenuation parameter - (dB/m).    Steatosis grade: S2       Madhuri Weeks NP-AZUCENA  Hepatology  [facility]

## 2019-08-28 NOTE — PROGRESS NOTES
[facility] Visit New Patient Note    REFERRING PROVIDER: , Dr. Hernandez    CHIEF COMPLAINT: elevated liver enzymes    HPI: This is a 59 y.o. White female referred for evaluation of elevated liver enzymes. Has had intermittently elevated transaminases and alk phos since 2011. ALT > AST. Tbili normal. Had liver biopsy in 2016 with cholecystectomy that showed minimal mixed steatosis and no fibrosis. Previous u/s's in 2014 and 2016 show fatty liver. Risk factors for NAFLD are HLD. No family h/o liver disease. Drinks alcohol 2 x month.  Hep C Ab (-) in 2017. (+) hep B immunity but never screened for hep B.         Recent EUS/EGD with Dr. Taveras 8/16/19 done for abd pain and elevated liver enzymes.   Impression:           - Normal esophagus.                        - Erythematous mucosa in the antrum. Biopsied.                        - Normal examined duodenum.                        - No dilation of the bile duct.                        - No retained stones in the bile duct.                        - Normal pancreas    Today she feels well, no complaints. Denies symptoms of hepatic decompensation including jaundice, dark urine, hematemesis, melena, slowed mentation, abdominal distention, or lower extremity edema.       FINAL PATHOLOGIC DIAGNOSIS  GALLBLADDER (CHOLECYSTECTOMY):  -Chronic cholecystitis  -Cholelithiasis  2. LIVER (WEDGE):  -Minimal mixed steatosis, 3%  -No significant fibrosis  -Special stains in microscopic section  Microscopic Examination  Special stains specimen 2  Trichrome stain: Rounded portal tracts, no fibrosis  Iron stain: Faint hepatocyte iron (0-1+ out of 4+)  Iron and trichrome stains with appropriate controls     Lab Results   Component Value Date    ALT 92 (H) 07/31/2019    AST 52 (H) 07/31/2019    ALKPHOS 140 (H) 07/31/2019    BILITOT 0.3 07/31/2019    ALBUMIN 4.1 07/31/2019    INR 0.9 05/25/2006     03/12/2019        Review of patient's allergies indicates:  No Known  Allergies      Current Outpatient Medications on File Prior to Visit   Medication Sig Dispense Refill    acetaminophen/diphenhydramine (TYLENOL PM ORAL) Take by mouth as needed.       ergocalciferol (VITAMIN D2) 50,000 unit Cap Take 1,000 Units by mouth once daily. Chewable tablet      acyclovir (ZOVIRAX) 400 MG tablet Take 1 tablet (400 mg total) by mouth 3 (three) times daily. for 7 days 21 tablet 0     No current facility-administered medications on file prior to visit.          PMHX:  has a past medical history of Abnormal Pap smear, Allergy, Hyperlipidemia, and Mild vitamin D deficiency.    PSHX:  has a past surgical history that includes  section, low transverse; breast augmentation; Cholecystectomy; Augmentation of breast; Blepharoplasty (Bilateral, 3/14/2019); and Endoscopic ultrasound of upper gastrointestinal tract (N/A, 2019).    FAMILY HISTORY: Negative for liver disease, reviewed in CSDN.    SOCIAL HISTORY:   Social History     Tobacco Use   Smoking Status Never Smoker   Smokeless Tobacco Never Used       Social History     Substance and Sexual Activity   Alcohol Use Yes    Comment: twice a month       Social History     Substance and Sexual Activity   Drug Use No     Works as a nurse in [employer]    ROS:   GENERAL: Denies fever, chills, (+) weight gain, no fatigue  HEENT: Denies headaches, dizziness, vision/hearing changes  CARDIOVASCULAR: Denies chest pain, palpitations, or edema  RESPIRATORY: Denies dyspnea, cough  GI: (+) RUQ abdominal pain, no rectal bleeding, nausea, vomiting. No change in bowel pattern or color  : Denies dysuria, hematuria   SKIN: Denies rash, itching   NEURO: Denies confusion, memory loss, or mood changes  PSYCH: Denies depression or anxiety  HEME/LYMPH: Denies easy bruising or bleeding  MSK: Denies joint pain or myalgia.     PHYSICAL EXAM:   Friendly White female, in no acute distress; alert and oriented to person, place and time  VITALS: /64 (BP Location:  "Right arm, Patient Position: Sitting, BP Method: Medium (Automatic))   Pulse 64   Resp 18   Ht 5' 6" (1.676 m)   Wt 71.3 kg (157 lb 3 oz)   LMP 09/30/2012 (LMP Unknown)   SpO2 99%   BMI 25.37 kg/m²   HENT: Normocephalic, without obvious abnormality. Oral mucosa pink and moist.   EYES: Sclerae anicteric.   NECK: Supple. No masses or cervical adenopathy.  CARDIOVASCULAR: Regular rate and rhythm. No murmurs.  RESPIRATORY: Normal respiratory effort. BBS CTA. No wheezes or crackles.  GI: Soft, non-tender, non-distended. No palpable hepatosplenomegaly. No masses palpable. No ascites.  EXTREMITIES:  No clubbing, cyanosis or edema.  SKIN: Warm and dry. No jaundice. No rashes noted to exposed skin. No telangectasias noted. No palmar erythema.  NEURO:  Normal gait. No asterixis.  PSYCH:  Memory intact. Thought and speech pattern appropriate. Behavior normal. No depression or anxiety noted.      LABS:  Lab Results   Component Value Date    WBC 5.21 03/12/2019    HGB 12.5 03/12/2019    HCT 38.8 03/12/2019     03/12/2019     03/25/2019    K 3.9 03/25/2019    CREATININE 0.8 03/25/2019    ALT 92 (H) 07/31/2019    AST 52 (H) 07/31/2019    ALKPHOS 140 (H) 07/31/2019    BILITOT 0.3 07/31/2019    BILIDIR 0.1 07/31/2019    ALBUMIN 4.1 07/31/2019    INR 0.9 05/25/2006    CHOL 243 (H) 03/25/2019    TRIG 65 03/25/2019    LDLCALC 148.0 03/25/2019    HGBA1C 5.2 12/21/2018       No results found for: HEPAIGG    No results found for: HEPBSAG  No results found for: HEPBCAB  Hep B S Ab   Date Value Ref Range Status   10/07/2005 Positive (A)  Final     Comment:     If further testing is needed, please contact the Blood Bank  within 3 days.       Hepatitis C Ab   Date Value Ref Range Status   06/02/2017 Negative  Final     Immunization History   Administered Date(s) Administered    Influenza - Quadrivalent - PF (6 months and older) 10/04/2016    Measles 12/01/2005    Mumps 12/01/2005    Rubella 12/01/2005    Td (ADULT) " 05/01/2008          DIAGNOSTIC STUDIES:  EGD/EUS - 8/16/19  Impression:           - Normal esophagus.                        - Erythematous mucosa in the antrum. Biopsied.                        - Normal examined duodenum.                        - No dilation of the bile duct.                        - No retained stones in the bile duct.                        - Normal pancreas    ABD. U/S- 5/18/16  Findings: The liver is normal in size, measuring 14.8cm.  Hepatic parenchyma is homogeneous without evidence for masses.  The liver demonstrates an elevated.  An index of 1.89 , which is nonspecific, suggestive of a diffuse infiltrative process such as steatosis.  No intra- or extrahepatic biliary ductal dilatation. The common bile duct measures 0.17 cm.  The gallbladder contains numerous gallstones filling the entirety of the lumen.  In there is no evidence of gallbladder wall thickening, or pericholecystic fluid.  Sonographic Granados's sign is negative. The visualized portions of the pancreas appear normal. The spleen is normal and measures 9.4 x 2.6 cm. No ascites.      Impression       1.  Numerous gallstones are again visualized filling the gallbladder without evidence of cholecystitis.    2.  Findings consistent with a diffuse infiltrative process of the liver, which is nonspecific, and can be seen with steatosis.     LIVER BIOPSY- 12/9/16    FINAL PATHOLOGIC DIAGNOSIS  GALLBLADDER (CHOLECYSTECTOMY):  -Chronic cholecystitis  -Cholelithiasis  2. LIVER (WEDGE):  -Minimal mixed steatosis, 3%  -No significant fibrosis  -Special stains in microscopic section  Microscopic Examination  Special stains specimen 2  Trichrome stain: Rounded portal tracts, no fibrosis  Iron stain: Faint hepatocyte iron (0-1+ out of 4+)  Iron and trichrome stains with appropriate controls      ASSESSMENT:  59 y.o. White female with:  1. Elevated liver enzymes, likely due to NAFLD  -- intermittent alk phos elevation with transaminase  elevation  -- u/s's show fatty liver  -- liver biopsy 2016 showed fatty liver  -- will check serologies to r/o other etiology  2. NAFLD  -- transaminases mildly elevated since 2011, ALT > AST  -- US's shows fatty liver  -- synthetic liver function nl  -- risk factors for fatty liver include HLD  -- liver biopsy - 12/2016 - minimal mixed steatosis, 3%, and no fibrosis      EDUCATION / DISCUSSION:   We discussed the manifestations of NAFLD. At this time there is no FDA approved therapy for NAFLD.   The patient and I discussed the importance of diet, exercise, and weight loss for management of NAFLD. We discussed a low fat, low carb/low sugar, high fiber diet, and a goal of 30 minutes of exercise 5 times per week.   Pt is aware that fatty liver can progress to steatohepatitis and possibly to cirrhosis, putting one at increased risk for liver cancer, liver failure, and death.      PLAN:  1. Weight loss goal of 10-15 lbs  2. Low fat, low cholesterol, low carb, high fiber, high protein diet  3. Exercise, 5 days a week, 30 min a day  4. Recommend good control of cholesterol, blood pressure, blood sugar levels  5. Fibroscan today for fibrosis and steatosis staging  6. Labs today  7. Will check immunity markers for HAV and arrange for vaccination if needed  8. Abd u/s  9. Follow up pending results      Thank you for allowing me to participate in the care of Jacek Duran    RAJIV Barahona-[facility]     Addendum: Fibroscan today = kPa 7.2, F1. CAP = 259, S2 steatosis. Discussed results with pt. Await results of labs and u/s to determine f/u. She will work on weight loss            NEUROLOGICAL: Strabismus of the R eye.

## 2019-08-28 NOTE — PROGRESS NOTES
I have personally performed a face to face diagnostic evaluation on this patient. I have reviewed and agree with today's findings and the care plan outlined by Madhuri Weeks NP      My findings are as follows:  Patient presents with elev transaminases ALT>AST since 2011, minimally elevated in 2008 and perfectly normal prior to 2008.  Alk phos was normal except for last measurement of 140.   Mild elev of cholesterol.  HCVAb neg, HBsAb pos, U/S in 2016 showed gallstones, fatty liver.  Cholecystectomy in 2016 -liver bx - minimal mixed steaatosis 3% (wedge biopsy).    EUS for abd pain - gastritis. BD normal.   Meds; vit D, zovirax, tylenol pRN.      Need serologies for autoimune liver diseases, iron sat and ferritin, ceruloplasmin, A1AT.   fibroscan to assess fat and fibrosis in the liver.       she will return to Madhuri Weeks NP  for follow-up.

## 2019-09-05 ENCOUNTER — PATIENT MESSAGE (OUTPATIENT)
Dept: HEPATOLOGY | Facility: CLINIC | Age: 59
End: 2019-09-05

## 2019-09-05 DIAGNOSIS — K76.0 NAFLD (NONALCOHOLIC FATTY LIVER DISEASE): Primary | ICD-10-CM

## 2019-09-05 NOTE — TELEPHONE ENCOUNTER
Labs negative for other causes. Labs indicate no immunity to hepatitis A. Vaccines sent to pharmacy. Pt notified via [facility]

## 2019-09-06 ENCOUNTER — PATIENT MESSAGE (OUTPATIENT)
Dept: HEPATOLOGY | Facility: CLINIC | Age: 59
End: 2019-09-06

## 2019-09-10 ENCOUNTER — IMMUNIZATION (OUTPATIENT)
Dept: PHARMACY | Facility: CLINIC | Age: 59
End: 2019-09-10
Payer: COMMERCIAL

## 2019-09-14 ENCOUNTER — HOSPITAL ENCOUNTER (OUTPATIENT)
Dept: RADIOLOGY | Facility: HOSPITAL | Age: 59
Discharge: HOME | End: 2019-09-14
Attending: NURSE PRACTITIONER
Payer: COMMERCIAL

## 2019-09-14 DIAGNOSIS — R74.8 ELEVATED LIVER ENZYMES: ICD-10-CM

## 2019-09-14 PROCEDURE — 76700 US EXAM ABDOM COMPLETE: CPT | Mod: TC

## 2019-09-14 PROCEDURE — 76700 US ABDOMEN COMPLETE: ICD-10-PCS | Mod: 26,,, | Performed by: RADIOLOGY

## 2019-09-14 PROCEDURE — 76700 US EXAM ABDOM COMPLETE: CPT | Mod: 26,,, | Performed by: RADIOLOGY

## 2020-01-30 ENCOUNTER — PATIENT MESSAGE (OUTPATIENT)
Dept: INTERNAL MEDICINE | Facility: CLINIC | Age: 60
End: 2020-01-30

## 2020-01-31 DIAGNOSIS — E78.5 HYPERLIPIDEMIA, UNSPECIFIED HYPERLIPIDEMIA TYPE: ICD-10-CM

## 2020-01-31 DIAGNOSIS — Z00.00 ANNUAL PHYSICAL EXAM: Primary | ICD-10-CM

## 2020-01-31 DIAGNOSIS — R79.89 ELEVATED LFTS: ICD-10-CM

## 2020-02-05 ENCOUNTER — LAB VISIT (OUTPATIENT)
Dept: LAB | Facility: HOSPITAL | Age: 60
End: 2020-02-05
Payer: COMMERCIAL

## 2020-02-05 DIAGNOSIS — Z00.00 ANNUAL PHYSICAL EXAM: ICD-10-CM

## 2020-02-05 DIAGNOSIS — E78.5 HYPERLIPIDEMIA, UNSPECIFIED HYPERLIPIDEMIA TYPE: ICD-10-CM

## 2020-02-05 DIAGNOSIS — R79.89 ELEVATED LFTS: ICD-10-CM

## 2020-02-05 LAB
ALBUMIN SERPL BCP-MCNC: 3.8 G/DL (ref 3.5–5.2)
ALP SERPL-CCNC: 97 U/L (ref 55–135)
ALT SERPL W/O P-5'-P-CCNC: 21 U/L (ref 10–44)
ANION GAP SERPL CALC-SCNC: 9 MMOL/L (ref 8–16)
AST SERPL-CCNC: 21 U/L (ref 10–40)
BASOPHILS # BLD AUTO: 0.03 K/UL (ref 0–0.2)
BASOPHILS NFR BLD: 0.7 % (ref 0–1.9)
BILIRUB SERPL-MCNC: 0.5 MG/DL (ref 0.1–1)
BUN SERPL-MCNC: 11 MG/DL (ref 6–20)
CALCIUM SERPL-MCNC: 9.4 MG/DL (ref 8.7–10.5)
CHLORIDE SERPL-SCNC: 108 MMOL/L (ref 95–110)
CHOLEST SERPL-MCNC: 245 MG/DL (ref 120–199)
CHOLEST/HDLC SERPL: 3 {RATIO} (ref 2–5)
CO2 SERPL-SCNC: 24 MMOL/L (ref 23–29)
CREAT SERPL-MCNC: 0.8 MG/DL (ref 0.5–1.4)
DIFFERENTIAL METHOD: NORMAL
EOSINOPHIL # BLD AUTO: 0.1 K/UL (ref 0–0.5)
EOSINOPHIL NFR BLD: 3.1 % (ref 0–8)
ERYTHROCYTE [DISTWIDTH] IN BLOOD BY AUTOMATED COUNT: 12.3 % (ref 11.5–14.5)
EST. GFR  (AFRICAN AMERICAN): >60 ML/MIN/1.73 M^2
EST. GFR  (NON AFRICAN AMERICAN): >60 ML/MIN/1.73 M^2
ESTIMATED AVG GLUCOSE: 111 MG/DL (ref 68–131)
GLUCOSE SERPL-MCNC: 86 MG/DL (ref 70–110)
HBA1C MFR BLD HPLC: 5.5 % (ref 4–5.6)
HCT VFR BLD AUTO: 37.1 % (ref 37–48.5)
HDLC SERPL-MCNC: 83 MG/DL (ref 40–75)
HDLC SERPL: 33.9 % (ref 20–50)
HGB BLD-MCNC: 12.2 G/DL (ref 12–16)
IMM GRANULOCYTES # BLD AUTO: 0.01 K/UL (ref 0–0.04)
IMM GRANULOCYTES NFR BLD AUTO: 0.2 % (ref 0–0.5)
LDLC SERPL CALC-MCNC: 150 MG/DL (ref 63–159)
LYMPHOCYTES # BLD AUTO: 1.8 K/UL (ref 1–4.8)
LYMPHOCYTES NFR BLD: 39 % (ref 18–48)
MCH RBC QN AUTO: 29.8 PG (ref 27–31)
MCHC RBC AUTO-ENTMCNC: 32.9 G/DL (ref 32–36)
MCV RBC AUTO: 91 FL (ref 82–98)
MONOCYTES # BLD AUTO: 0.4 K/UL (ref 0.3–1)
MONOCYTES NFR BLD: 9.3 % (ref 4–15)
NEUTROPHILS # BLD AUTO: 2.2 K/UL (ref 1.8–7.7)
NEUTROPHILS NFR BLD: 47.7 % (ref 38–73)
NONHDLC SERPL-MCNC: 162 MG/DL
NRBC BLD-RTO: 0 /100 WBC
PLATELET # BLD AUTO: 233 K/UL (ref 150–350)
PMV BLD AUTO: 11.2 FL (ref 9.2–12.9)
POTASSIUM SERPL-SCNC: 4.3 MMOL/L (ref 3.5–5.1)
PROT SERPL-MCNC: 7.5 G/DL (ref 6–8.4)
RBC # BLD AUTO: 4.1 M/UL (ref 4–5.4)
SODIUM SERPL-SCNC: 141 MMOL/L (ref 136–145)
TRIGL SERPL-MCNC: 60 MG/DL (ref 30–150)
TSH SERPL DL<=0.005 MIU/L-ACNC: 1.65 UIU/ML (ref 0.4–4)
WBC # BLD AUTO: 4.54 K/UL (ref 3.9–12.7)

## 2020-02-05 PROCEDURE — 80053 COMPREHEN METABOLIC PANEL: CPT

## 2020-02-05 PROCEDURE — 80061 LIPID PANEL: CPT

## 2020-02-05 PROCEDURE — 83036 HEMOGLOBIN GLYCOSYLATED A1C: CPT

## 2020-02-05 PROCEDURE — 85025 COMPLETE CBC W/AUTO DIFF WBC: CPT

## 2020-02-05 PROCEDURE — 84443 ASSAY THYROID STIM HORMONE: CPT

## 2020-02-05 PROCEDURE — 36415 COLL VENOUS BLD VENIPUNCTURE: CPT

## 2020-02-12 ENCOUNTER — PATIENT OUTREACH (OUTPATIENT)
Dept: ADMINISTRATIVE | Facility: OTHER | Age: 60
End: 2020-02-12

## 2020-02-12 ENCOUNTER — OFFICE VISIT (OUTPATIENT)
Dept: INTERNAL MEDICINE | Facility: CLINIC | Age: 60
End: 2020-02-12
Payer: COMMERCIAL

## 2020-02-12 VITALS
HEART RATE: 76 BPM | TEMPERATURE: 98 F | BODY MASS INDEX: 23.38 KG/M2 | WEIGHT: 145.5 LBS | SYSTOLIC BLOOD PRESSURE: 110 MMHG | HEIGHT: 66 IN | DIASTOLIC BLOOD PRESSURE: 70 MMHG

## 2020-02-12 DIAGNOSIS — Z12.31 VISIT FOR SCREENING MAMMOGRAM: ICD-10-CM

## 2020-02-12 DIAGNOSIS — E78.5 HYPERLIPIDEMIA, UNSPECIFIED HYPERLIPIDEMIA TYPE: Chronic | ICD-10-CM

## 2020-02-12 DIAGNOSIS — K76.0 NAFLD (NONALCOHOLIC FATTY LIVER DISEASE): Chronic | ICD-10-CM

## 2020-02-12 DIAGNOSIS — Z00.00 ANNUAL PHYSICAL EXAM: Primary | ICD-10-CM

## 2020-02-12 PROCEDURE — 99396 PREV VISIT EST AGE 40-64: CPT | Mod: S$GLB,,, | Performed by: INTERNAL MEDICINE

## 2020-02-12 PROCEDURE — 99999 PR PBB SHADOW E&M-EST. PATIENT-LVL IV: CPT | Mod: PBBFAC,,, | Performed by: INTERNAL MEDICINE

## 2020-02-12 PROCEDURE — 99999 PR PBB SHADOW E&M-EST. PATIENT-LVL IV: ICD-10-PCS | Mod: PBBFAC,,, | Performed by: INTERNAL MEDICINE

## 2020-02-12 PROCEDURE — 99396 PR PREVENTIVE VISIT,EST,40-64: ICD-10-PCS | Mod: S$GLB,,, | Performed by: INTERNAL MEDICINE

## 2020-02-12 NOTE — PROGRESS NOTES
Care Everywhere: n/a  Immunization: updated  Health Maintenance: updated  Media Review: n/a  Legacy Review: n/a  Order placed: n/a  Upcoming appts:n/a

## 2020-02-12 NOTE — PROGRESS NOTES
Subjective:       Patient ID: Jacek Duran is a 59 y.o. female.    Chief Complaint: Annual Exam    HPI     59 y.o. female here for annual exam.     Cholesterol: total 245, HDL 83,   Vaccines: Influenza - done; Tetanus - 2018; Zoster - needs  Sexual Screening: not active  STD screening: not active  Eye exam: done last year, due now.  Mammogram: due  Gyn exam:  Not sure  Colonoscopy: done   A1c: 5.5    Exercise: no regular exercise.  Just sold her house.  Plans to start exercising today at [employer].  Diet: she was eating buttered grits every morning at work and coconut cluster chips.  She started eating oatmeal instead.    Past Medical History:   Diagnosis Date    Abnormal Pap smear     before , none since    Allergy     Hyperlipidemia     Mild vitamin D deficiency     NAFLD (nonalcoholic fatty liver disease)      Past Surgical History:   Procedure Laterality Date    AUGMENTATION OF BREAST      BLEPHAROPLASTY Bilateral 3/14/2019    Procedure: BLEPHAROPLASTY;  Surgeon: Beltran Magaña III, MD;  Location: [address];  Service: Plastics;  Laterality: Bilateral;  upper    breast augmentation       SECTION, LOW TRANSVERSE      CHOLECYSTECTOMY      ENDOSCOPIC ULTRASOUND OF UPPER GASTROINTESTINAL TRACT N/A 2019    Procedure: ULTRASOUND, UPPER GI TRACT, ENDOSCOPIC;  Surgeon: Antony Taveras MD;  Location: [facility] ([address] [place] [address]);  Service: Endoscopy;  Laterality: N/A;  PM prep    LIVER BIOPSY       Social History     Socioeconomic History    Marital status: Single     Spouse name: Not on file    Number of children: 2    Years of education: Not on file    Highest education level: Not on file   Occupational History    Occupation: [employer]     Employer: [employer]   Social Needs    Financial resource strain: Not hard at all    Food insecurity:     Worry: Never true     Inability: Never true    Transportation needs:     Medical: No     Non-medical: No   Tobacco Use     Smoking status: Never Smoker    Smokeless tobacco: Never Used   Substance and Sexual Activity    Alcohol use: Yes     Frequency: Monthly or less     Drinks per session: 1 or 2     Binge frequency: Never     Comment: twice a month    Drug use: No    Sexual activity: Not Currently     Birth control/protection: Post-menopausal   Lifestyle    Physical activity:     Days per week: 3 days     Minutes per session: 60 min    Stress: Only a little   Relationships    Social connections:     Talks on phone: More than three times a week     Gets together: Three times a week     Attends Evangelical service: Not on file     Active member of club or organization: No     Attends meetings of clubs or organizations: Never     Relationship status:    Other Topics Concern    Are you pregnant or think you may be? No    Breast-feeding Not Asked   Social History Narrative    She is exercising on and off.     Review of patient's allergies indicates:  No Known Allergies  Elizabeth Duran had no medications administered during this visit.    Review of Systems   Constitutional: Negative for activity change and unexpected weight change.   HENT: Negative for hearing loss, rhinorrhea and trouble swallowing.    Eyes: Negative for discharge and visual disturbance.   Respiratory: Negative for chest tightness and wheezing.    Cardiovascular: Negative for chest pain and palpitations.   Gastrointestinal: Negative for blood in stool, constipation, diarrhea and vomiting.   Endocrine: Negative for polydipsia and polyuria.   Genitourinary: Negative for difficulty urinating, dysuria, hematuria and menstrual problem.   Musculoskeletal: Negative for arthralgias, joint swelling and neck pain.   Neurological: Negative for weakness and headaches.   Psychiatric/Behavioral: Negative for confusion and dysphoric mood.       Objective:      Physical Exam   Constitutional: She is oriented to person, place, and time. She appears well-developed and  well-nourished.   HENT:   Head: Normocephalic and atraumatic.   Mouth/Throat: No oropharyngeal exudate.   Eyes: Pupils are equal, round, and reactive to light. EOM are normal. Right eye exhibits no discharge. Left eye exhibits no discharge. No scleral icterus.   Neck: Normal range of motion. Neck supple. No tracheal deviation present. No thyromegaly present.   Cardiovascular: Normal rate, regular rhythm and normal heart sounds. Exam reveals no gallop and no friction rub.   No murmur heard.  Pulmonary/Chest: Effort normal and breath sounds normal. No respiratory distress. She has no wheezes. She has no rales. She exhibits no tenderness.   Abdominal: Soft. Bowel sounds are normal. She exhibits no distension and no mass. There is no tenderness. There is no rebound and no guarding.   Musculoskeletal: Normal range of motion. She exhibits no edema or tenderness.   Neurological: She is alert and oriented to person, place, and time.   Skin: Skin is warm and dry. No rash noted. No erythema. No pallor.   Psychiatric: She has a normal mood and affect. Her behavior is normal.   Vitals reviewed.      Assessment:       1. Annual physical exam    2. Hyperlipidemia, unspecified hyperlipidemia type    3. NAFLD (nonalcoholic fatty liver disease)    4. Visit for screening mammogram        Plan:       1.  Reviewed lab patient.  Up-to-date on vaccines.  Discussed shingles vaccine.  Sent to pharmacy.  Discussed diet and exercise with patient.  2.  Monitor.  Continue plant based diet.  3.  Recheck ultrasound.  Think patient has reversed this.  4.  Mammogram.

## 2020-02-13 ENCOUNTER — OFFICE VISIT (OUTPATIENT)
Dept: DERMATOLOGY | Facility: CLINIC | Age: 60
End: 2020-02-13
Payer: COMMERCIAL

## 2020-02-13 DIAGNOSIS — L82.1 SEBORRHEIC KERATOSIS: ICD-10-CM

## 2020-02-13 DIAGNOSIS — D22.62 MELANOCYTIC NEVUS OF LEFT UPPER EXTREMITY: ICD-10-CM

## 2020-02-13 DIAGNOSIS — Z86.018 HISTORY OF DYSPLASTIC NEVUS: ICD-10-CM

## 2020-02-13 DIAGNOSIS — L82.0 INFLAMED SEBORRHEIC KERATOSIS: Primary | ICD-10-CM

## 2020-02-13 DIAGNOSIS — L90.5 SCAR: ICD-10-CM

## 2020-02-13 DIAGNOSIS — L81.4 LENTIGINES: ICD-10-CM

## 2020-02-13 DIAGNOSIS — D22.61 MELANOCYTIC NEVUS OF RIGHT UPPER EXTREMITY: ICD-10-CM

## 2020-02-13 PROCEDURE — 99214 PR OFFICE/OUTPT VISIT, EST, LEVL IV, 30-39 MIN: ICD-10-PCS | Mod: 25,S$GLB,, | Performed by: DERMATOLOGY

## 2020-02-13 PROCEDURE — 99214 OFFICE O/P EST MOD 30 MIN: CPT | Mod: 25,S$GLB,, | Performed by: DERMATOLOGY

## 2020-02-13 PROCEDURE — 17110 PR DESTRUCTION BENIGN LESIONS UP TO 14: ICD-10-PCS | Mod: S$GLB,,, | Performed by: DERMATOLOGY

## 2020-02-13 PROCEDURE — 17110 DESTRUCTION B9 LES UP TO 14: CPT | Mod: S$GLB,,, | Performed by: DERMATOLOGY

## 2020-02-13 NOTE — PROGRESS NOTES
Subjective:       Patient ID:  Jacek Duran is a 59 y.o. female who presents for   Chief Complaint   Patient presents with    Spot     back     This is a high risk patient with a history of moderately atypical nevus who is here today to check for the development of new lesions.     Spot  - Initial  Affected locations: back and abdomen  Duration: 2 weeks  Signs / symptoms: itching and peeling  Severity: mild to moderate  Timing: intermittent  Aggravated by: scratching and friction (from bra)  Relieving factors/Treatments tried: nothing    Mole  - Initial  Affected locations: diffuse  Signs and Symptoms: No change in size, shape, or color. Patient denies any itching, bleeding, pain, or rapid growth.  Severity: mild  Timing: constant  Aggravated by: nothing  Treatments tried: previous removal of moderately atypical nevus.      Review of Systems   Musculoskeletal: Negative for joint swelling and arthralgias.   Skin: Negative for recent sunburn.   Hematologic/Lymphatic: Does not bruise/bleed easily.        Objective:    Physical Exam   Constitutional: She appears well-developed and well-nourished. No distress.   HENT:   Mouth/Throat: Lips normal.    Eyes: Lids are normal.  No conjunctival no injection.   Neurological: She is alert and oriented to person, place, and time. She is not disoriented.   Psychiatric: She has a normal mood and affect.   Skin:   Areas Examined (abnormalities noted in diagram):   Scalp / Hair Palpated and Inspected  Head / Face Inspection Performed  Neck Inspection Performed  Chest / Axilla Inspection Performed  Abdomen Inspection Performed  Genitals / Buttocks / Groin Inspection Performed  Back Inspection Performed  RUE Inspected  LUE Inspection Performed  RLE Inspected  LLE Inspection Performed  Nails and Digits Inspection Performed                   Diagram Legend     Erythematous scaling macule/papule c/w actinic keratosis       Vascular papule c/w angioma      Pigmented verrucoid  papule/plaque c/w seborrheic keratosis      Yellow umbilicated papule c/w sebaceous hyperplasia      Irregularly shaped tan macule c/w lentigo     1-2 mm smooth white papules consistent with Milia      Movable subcutaneous cyst with punctum c/w epidermal inclusion cyst      Subcutaneous movable cyst c/w pilar cyst      Firm pink to brown papule c/w dermatofibroma      Pedunculated fleshy papule(s) c/w skin tag(s)      Evenly pigmented macule c/w junctional nevus     Mildly variegated pigmented, slightly irregular-bordered macule c/w mildly atypical nevus      Flesh colored to evenly pigmented papule c/w intradermal nevus       Pink pearly papule/plaque c/w basal cell carcinoma      Erythematous hyperkeratotic cursted plaque c/w SCC      Surgical scar with no sign of skin cancer recurrence      Open and closed comedones      Inflammatory papules and pustules      Verrucoid papule consistent consistent with wart     Erythematous eczematous patches and plaques     Dystrophic onycholytic nail with subungual debris c/w onychomycosis     Umbilicated papule    Erythematous-base heme-crusted tan verrucoid plaque consistent with inflamed seborrheic keratosis     Erythematous Silvery Scaling Plaque c/w Psoriasis     See annotation      Assessment / Plan:        Inflamed seborrheic keratosis  Cryosurgery procedure note:  Risk, benefits, and alternatives of cryosurgery are discussed with the patient, including but not limited to the risks of hypopigmentation, hyperpigmentation, scar, infection, recurrence of lesion(s), development of new lesion(s), and need for additional treatment of the lesion(s). Verbal consent obtained from patient. Liquid nitrogen cryosurgery applied to 2 lesion(s) to produce a freeze injury. Counseled patient that blisters may form, and instructed patient on wound care with gentle cleansing and use of Vaseline ointment to keep moist until healed. Handout was provided, and patient was instructed to return  to clinic in 1-2 months if lesions do not completely resolve.    Melanocytic nevus of left upper extremity  Melanocytic nevus of right upper extremity  Multiple benign-appearing nevi present on exam today. Reassurance provided. Counseled patient to periodically examine moles and return to clinic if any changes in size, shape, or color are noted or if it becomes symptomatic (bleeding, itching, pain, etc).    Seborrheic keratosis  These are benign, inherited growths without a malignant potential. Reassurance given to patient. No treatment is necessary. Handout was provided.    Lentigines  These are benign sun spots which should be monitored for changes. Daily sun protection will reduce the number of new lesions.   Patient instructed in importance of daily broad-spectrum sunscreen use with SPF of at least 30. Sun avoidance and topical protection/protective clothing discussed.    History of dysplastic nevus, moderate atypia  Scar  Area(s) of previous dysplastic nevus evaluated with no signs of recurrence. Reassurance provided.    Follow up in about 1 year (around 2/13/2021) for TBSE, or sooner if any new problems or changing lesions.

## 2020-02-24 ENCOUNTER — PATIENT OUTREACH (OUTPATIENT)
Dept: ADMINISTRATIVE | Facility: OTHER | Age: 60
End: 2020-02-24

## 2020-02-26 ENCOUNTER — OFFICE VISIT (OUTPATIENT)
Dept: OBSTETRICS AND GYNECOLOGY | Facility: CLINIC | Age: 60
End: 2020-02-26
Attending: INTERNAL MEDICINE
Payer: COMMERCIAL

## 2020-02-26 VITALS
SYSTOLIC BLOOD PRESSURE: 114 MMHG | BODY MASS INDEX: 23.41 KG/M2 | WEIGHT: 145.06 LBS | DIASTOLIC BLOOD PRESSURE: 62 MMHG

## 2020-02-26 DIAGNOSIS — Z01.419 ENCOUNTER FOR ANNUAL ROUTINE GYNECOLOGICAL EXAMINATION: Primary | ICD-10-CM

## 2020-02-26 DIAGNOSIS — Z78.0 POSTMENOPAUSAL STATUS: ICD-10-CM

## 2020-02-26 DIAGNOSIS — Z12.4 PAP SMEAR FOR CERVICAL CANCER SCREENING: ICD-10-CM

## 2020-02-26 DIAGNOSIS — Z12.31 VISIT FOR SCREENING MAMMOGRAM: ICD-10-CM

## 2020-02-26 PROCEDURE — 99999 PR PBB SHADOW E&M-EST. PATIENT-LVL III: CPT | Mod: PBBFAC,,, | Performed by: OBSTETRICS & GYNECOLOGY

## 2020-02-26 PROCEDURE — 88175 CYTOPATH C/V AUTO FLUID REDO: CPT

## 2020-02-26 PROCEDURE — 99396 PR PREVENTIVE VISIT,EST,40-64: ICD-10-PCS | Mod: S$GLB,,, | Performed by: OBSTETRICS & GYNECOLOGY

## 2020-02-26 PROCEDURE — 99396 PREV VISIT EST AGE 40-64: CPT | Mod: S$GLB,,, | Performed by: OBSTETRICS & GYNECOLOGY

## 2020-02-26 PROCEDURE — 87624 HPV HI-RISK TYP POOLED RSLT: CPT

## 2020-02-26 PROCEDURE — 99999 PR PBB SHADOW E&M-EST. PATIENT-LVL III: ICD-10-PCS | Mod: PBBFAC,,, | Performed by: OBSTETRICS & GYNECOLOGY

## 2020-02-26 NOTE — LETTER
February 26, 2020      Naren Hernandez MD  [address]  [place] [address]           [place] [facility]  [address] [place] [address]  [place] [address]  Phone: [phone]  Fax: [phone]          Patient: Jacek Duran   MR Number: [id number]   YOB: 1960   Date of Visit: 2/26/2020       Dear Dr. Naren Hernandez:    Thank you for referring Jacek Duran to me for evaluation. Attached you will find relevant portions of my assessment and plan of care.    If you have questions, please do not hesitate to call me. I look forward to following Jacek Duran along with you.    Sincerely,    Ignacio Burns MD    Enclosure  CC:  No Recipients    If you would like to receive this communication electronically, please contact [email or web address] or [phone] to request more information on [place] Link access.    For providers and/or their staff who would like to refer a patient to [facility], please contact us through our one-stop-shop provider referral line, [facility] , at [phone].    If you feel you have received this communication in error or would no longer like to receive these types of communications, please e-mail [email or web address]

## 2020-02-26 NOTE — PROGRESS NOTES
Jacek Duran is a 59 y.o. year old  female who presents for routine GYN exam.  She is postmenopausal, not on hormones.  Denies bleeding, flashes, and sweats.  Reports that her liver functions are now normal.  No GYN complaints.      Past Medical History:   Diagnosis Date    Abnormal Pap smear     before 2005, none since    Allergy     History of dysplastic nevus, moderate atypia 2020: Mid upper abdomen    Hyperlipidemia     Mild vitamin D deficiency     NAFLD (nonalcoholic fatty liver disease)        Past Surgical History:   Procedure Laterality Date    AUGMENTATION OF BREAST      BLEPHAROPLASTY Bilateral 3/14/2019    Procedure: BLEPHAROPLASTY;  Surgeon: Beltran Magaña III, MD;  Location: [facility] OR [address] [place] [address];  Service: Plastics;  Laterality: Bilateral;  upper    breast augmentation       SECTION, LOW TRANSVERSE      CHOLECYSTECTOMY      ENDOSCOPIC ULTRASOUND OF UPPER GASTROINTESTINAL TRACT N/A 2019    Procedure: ULTRASOUND, UPPER GI TRACT, ENDOSCOPIC;  Surgeon: Antony Taveras MD;  Location: [facility] ([address] [place] [address]);  Service: Endoscopy;  Laterality: N/A;  PM prep    LIVER BIOPSY         OB History        5    Para   5    Term   3            AB        Living   2       SAB        TAB        Ectopic        Multiple        Live Births                       ROS:  GENERAL: Feeling well overall.   SKIN: Denies rash or lesions.   HEAD: Denies head injury or headache.   NODES: Denies enlarged lymph nodes.   CHEST: Denies chest pain or shortness of breath.   CARDIOVASCULAR: Denies palpitations or left sided chest pain.   ABDOMEN: No abdominal pain, nausea, vomiting or rectal bleeding.   URINARY: No dysuria or hematuria.  REPRODUCTIVE: See HPI.   BREASTS: Denies pain, lumps, or nipple discharge.   HEMATOLOGIC: No easy bruisability or excessive bleeding.   MUSCULOSKELETAL: Denies joint pain.  NEUROLOGIC: Denies syncope or weakness.   PSYCHIATRIC: Denies depression.      PE:   (chaperone present during entire exam)  APPEARANCE: Well nourished, well developed, in no acute distress.  BREASTS: Symmetrical, no skin changes or visible lesions. No palpable masses, nipple discharge or adenopathy bilaterally.  ABDOMEN: Soft. No tenderness or masses. No hernias. No CVA tenderness.  VULVA: No lesions. Normal female genitalia.  URETHRAL MEATUS: Normal size and location, no lesions, no prolapse.  URETHRA: No masses, tenderness, prolapse or scarring.  VAGINA: No lesions, no abnormal discharge, no significant cystocele or rectocele.  CERVIX: No lesions and discharge. PAP done.  UTERUS: Normal size, regular shape, mobile, non-tender, bladder base nontender.  ADNEXA: No masses, tenderness or CDS nodularity.  ANUS PERINEUM: Normal.    Diagnosis:  1. Encounter for annual routine gynecological examination    2. Postmenopausal status    3. Pap smear for cervical cancer screening    4. Visit for screening mammogram          PLAN:    Orders Placed This Encounter    HPV High Risk Genotypes, PCR    Mammo Digital Screening Bilat w/ Moo    Liquid-Based Pap Smear, Screening       Patient was counseled today on postmenopausal issues.    Follow-up in 1 year.

## 2020-02-28 ENCOUNTER — HOSPITAL ENCOUNTER (OUTPATIENT)
Dept: RADIOLOGY | Facility: HOSPITAL | Age: 60
Discharge: HOME | End: 2020-02-28
Attending: INTERNAL MEDICINE
Payer: COMMERCIAL

## 2020-02-28 DIAGNOSIS — K76.0 NAFLD (NONALCOHOLIC FATTY LIVER DISEASE): ICD-10-CM

## 2020-02-28 PROCEDURE — 76705 US ABDOMEN LIMITED: ICD-10-PCS | Mod: 26,,, | Performed by: RADIOLOGY

## 2020-02-28 PROCEDURE — 76705 ECHO EXAM OF ABDOMEN: CPT | Mod: TC

## 2020-02-28 PROCEDURE — 76705 ECHO EXAM OF ABDOMEN: CPT | Mod: 26,,, | Performed by: RADIOLOGY

## 2020-03-04 ENCOUNTER — PATIENT OUTREACH (OUTPATIENT)
Dept: ADMINISTRATIVE | Facility: OTHER | Age: 60
End: 2020-03-04

## 2020-03-04 LAB
HPV HR 12 DNA SPEC QL NAA+PROBE: NEGATIVE
HPV16 AG SPEC QL: NEGATIVE
HPV18 DNA SPEC QL NAA+PROBE: NEGATIVE

## 2020-03-05 ENCOUNTER — OFFICE VISIT (OUTPATIENT)
Dept: OPTOMETRY | Facility: CLINIC | Age: 60
End: 2020-03-05
Payer: COMMERCIAL

## 2020-03-05 DIAGNOSIS — Z00.00 ANNUAL PHYSICAL EXAM: ICD-10-CM

## 2020-03-05 PROCEDURE — 99999 PR PBB SHADOW E&M-EST. PATIENT-LVL I: ICD-10-PCS | Mod: PBBFAC,,, | Performed by: OPTOMETRIST

## 2020-03-05 PROCEDURE — 99999 PR PBB SHADOW E&M-EST. PATIENT-LVL I: CPT | Mod: PBBFAC,,, | Performed by: OPTOMETRIST

## 2020-03-05 PROCEDURE — 99499 NO LOS: ICD-10-PCS | Mod: S$GLB,,, | Performed by: OPTOMETRIST

## 2020-03-05 PROCEDURE — 99499 UNLISTED E&M SERVICE: CPT | Mod: S$GLB,,, | Performed by: OPTOMETRIST

## 2020-03-10 ENCOUNTER — PATIENT OUTREACH (OUTPATIENT)
Dept: ADMINISTRATIVE | Facility: OTHER | Age: 60
End: 2020-03-10

## 2020-03-11 ENCOUNTER — OFFICE VISIT (OUTPATIENT)
Dept: OPTOMETRY | Facility: CLINIC | Age: 60
End: 2020-03-11
Payer: COMMERCIAL

## 2020-03-11 DIAGNOSIS — H52.4 MYOPIA WITH PRESBYOPIA, BILATERAL: Primary | ICD-10-CM

## 2020-03-11 DIAGNOSIS — Z04.9 DISEASE RULED OUT AFTER EXAMINATION: ICD-10-CM

## 2020-03-11 DIAGNOSIS — H52.13 MYOPIA WITH PRESBYOPIA, BILATERAL: Primary | ICD-10-CM

## 2020-03-11 DIAGNOSIS — H25.13 NS (NUCLEAR SCLEROSIS), BILATERAL: ICD-10-CM

## 2020-03-11 PROCEDURE — 99999 PR PBB SHADOW E&M-EST. PATIENT-LVL II: CPT | Mod: PBBFAC,,, | Performed by: OPTOMETRIST

## 2020-03-11 PROCEDURE — 99999 PR PBB SHADOW E&M-EST. PATIENT-LVL II: ICD-10-PCS | Mod: PBBFAC,,, | Performed by: OPTOMETRIST

## 2020-03-11 PROCEDURE — 92015 DETERMINE REFRACTIVE STATE: CPT | Mod: S$GLB,,, | Performed by: OPTOMETRIST

## 2020-03-11 PROCEDURE — 92015 PR REFRACTION: ICD-10-PCS | Mod: S$GLB,,, | Performed by: OPTOMETRIST

## 2020-03-11 PROCEDURE — 92014 COMPRE OPH EXAM EST PT 1/>: CPT | Mod: S$GLB,,, | Performed by: OPTOMETRIST

## 2020-03-11 PROCEDURE — 92014 PR EYE EXAM, EST PATIENT,COMPREHESV: ICD-10-PCS | Mod: S$GLB,,, | Performed by: OPTOMETRIST

## 2020-03-11 RX ORDER — ACETAMINOPHEN 500 MG
TABLET ORAL
COMMUNITY
End: 2022-05-12

## 2020-03-11 NOTE — PROGRESS NOTES
HPI     Pt here for annual exam  Not interested in updating cl rx today   No complaints about va   Using svl distance lenses but would be willing to try a bifocal  Patient denies diplopia, headaches, flashes/floaters, pain, and   itching/burning/tearing.    Pt does not use any eye drops.      Last edited by Latesha Montesinos on 3/11/2020  2:53 PM. (History)            Assessment /Plan     For exam results, see Encounter Report.    Myopia with presbyopia, bilateral    NS (nuclear sclerosis), bilateral    Disease ruled out after examination      Rx specs  Good overall ocular health, monitor yearly

## 2020-03-11 NOTE — LETTER
March 11, 2020      Naren Hernandez MD  [address]  [place] [address]           [facility]  [address]  [place] [address]  Phone: [phone]  Fax: [phone]          Patient: Jacek Duran   MR Number: [id number]   YOB: 1960   Date of Visit: 3/11/2020       Dear Dr. Naren Hernandez:    Thank you for referring Jacek Duran to me for evaluation. Attached you will find relevant portions of my assessment and plan of care.    If you have questions, please do not hesitate to call me. I look forward to following Jacek Duran along with you.    Sincerely,    Yola Vásquez, OD    Enclosure  CC:  No Recipients    If you would like to receive this communication electronically, please contact [email or web address] or [phone] to request more information on [facility] Link access.    For providers and/or their staff who would like to refer a patient to [facility], please contact us through our one-stop-shop provider referral line, [facility], at [phone].    If you feel you have received this communication in error or would no longer like to receive these types of communications, please e-mail [email or web address]

## 2020-03-21 DIAGNOSIS — R50.9 FEVER, UNSPECIFIED: Primary | ICD-10-CM

## 2020-03-22 ENCOUNTER — CLINICAL SUPPORT (OUTPATIENT)
Dept: INTERNAL MEDICINE | Facility: CLINIC | Age: 60
End: 2020-03-22
Payer: COMMERCIAL

## 2020-03-22 DIAGNOSIS — R50.9 FEVER, UNSPECIFIED: ICD-10-CM

## 2020-03-22 PROCEDURE — U0002 COVID-19 LAB TEST NON-CDC: HCPCS

## 2020-03-23 LAB — SARS-COV-2 RNA RESP QL NAA+PROBE: NOT DETECTED

## 2020-03-24 ENCOUNTER — TELEPHONE (OUTPATIENT)
Dept: INTERNAL MEDICINE | Facility: CLINIC | Age: 60
End: 2020-03-24

## 2020-03-24 NOTE — TELEPHONE ENCOUNTER
Called patient with results of negative Covid-19 testing. She is afebrile with improving symptoms. Advised to call employee health for return to work.

## 2020-03-26 ENCOUNTER — PATIENT MESSAGE (OUTPATIENT)
Dept: OBSTETRICS AND GYNECOLOGY | Facility: CLINIC | Age: 60
End: 2020-03-26

## 2020-03-26 LAB
FINAL PATHOLOGIC DIAGNOSIS: NORMAL
Lab: NORMAL

## 2020-04-03 ENCOUNTER — PATIENT MESSAGE (OUTPATIENT)
Dept: INTERNAL MEDICINE | Facility: CLINIC | Age: 60
End: 2020-04-03

## 2020-04-05 ENCOUNTER — TELEPHONE (OUTPATIENT)
Dept: INTERNAL MEDICINE | Facility: CLINIC | Age: 60
End: 2020-04-05

## 2020-04-05 NOTE — TELEPHONE ENCOUNTER
Pt has advised that she hs a fatty liver and she has been sent to a covid 19 unit she is concerned she may be high risk please advise. Thanks

## 2020-04-06 ENCOUNTER — NURSE TRIAGE (OUTPATIENT)
Dept: ADMINISTRATIVE | Facility: CLINIC | Age: 60
End: 2020-04-06

## 2020-04-06 DIAGNOSIS — R50.9 FEVER, UNSPECIFIED: Primary | ICD-10-CM

## 2020-04-06 NOTE — TELEPHONE ENCOUNTER
Pt is an employee and called with a concern/question to be retested for COVID-19. She states that she tested negative about 3 weeks ago. (Prior test order date per chart was on 3/22/2020). Pt has headache and cough. She also stated that she has a rash on her arms, but feels that the rash is from the Purell and glove use. The pt stated that she has been afebrile. She mentioned that she spoke with her supervisors and [employer] ([employer]) this morning. She has not heard back from [employer] ([employer]). The [employer] ([employer]H) phone number was given to the patient. This is a different number than she had. She was instructed to call the [employer] [facility] after this call as they are providing directives for employees. She stated understanding.    Reason for Disposition   Health Information question, no triage required and triager able to answer question    Protocols used: INFORMATION ONLY CALL-A-

## 2020-04-07 ENCOUNTER — CLINICAL SUPPORT (OUTPATIENT)
Dept: INTERNAL MEDICINE | Facility: CLINIC | Age: 60
End: 2020-04-07
Payer: COMMERCIAL

## 2020-04-07 DIAGNOSIS — R50.9 FEVER, UNSPECIFIED: ICD-10-CM

## 2020-04-07 LAB — SARS-COV-2 RNA RESP QL NAA+PROBE: NOT DETECTED

## 2020-04-07 PROCEDURE — U0002 COVID-19 LAB TEST NON-CDC: HCPCS

## 2020-04-21 DIAGNOSIS — Z01.84 ANTIBODY RESPONSE EXAMINATION: ICD-10-CM

## 2020-04-29 ENCOUNTER — LAB VISIT (OUTPATIENT)
Dept: LAB | Facility: HOSPITAL | Age: 60
End: 2020-04-29
Attending: INTERNAL MEDICINE
Payer: COMMERCIAL

## 2020-04-29 DIAGNOSIS — Z01.84 ANTIBODY RESPONSE EXAMINATION: ICD-10-CM

## 2020-04-29 LAB — SARS-COV-2 IGG SERPL QL IA: NEGATIVE

## 2020-04-29 PROCEDURE — 36415 COLL VENOUS BLD VENIPUNCTURE: CPT

## 2020-04-29 PROCEDURE — 86769 SARS-COV-2 COVID-19 ANTIBODY: CPT

## 2020-08-19 ENCOUNTER — LAB VISIT (OUTPATIENT)
Dept: URGENT CARE | Facility: CLINIC | Age: 60
End: 2020-08-19
Payer: COMMERCIAL

## 2020-08-19 ENCOUNTER — TELEPHONE (OUTPATIENT)
Dept: EMERGENCY MEDICINE | Facility: HOSPITAL | Age: 60
End: 2020-08-19

## 2020-08-19 VITALS — TEMPERATURE: 100 F

## 2020-08-19 DIAGNOSIS — R11.0 NAUSEA: Primary | ICD-10-CM

## 2020-08-19 DIAGNOSIS — R11.0 NAUSEA: ICD-10-CM

## 2020-08-19 LAB
CTP QC/QA: YES
SARS-COV-2 RDRP RESP QL NAA+PROBE: NEGATIVE

## 2020-08-19 PROCEDURE — U0002: ICD-10-PCS | Mod: S$GLB,,, | Performed by: INTERNAL MEDICINE

## 2020-08-19 PROCEDURE — U0002 COVID-19 LAB TEST NON-CDC: HCPCS | Mod: S$GLB,,, | Performed by: INTERNAL MEDICINE

## 2020-11-25 ENCOUNTER — TELEPHONE (OUTPATIENT)
Dept: PRIMARY CARE CLINIC | Facility: OTHER | Age: 60
End: 2020-11-25

## 2020-11-25 ENCOUNTER — OCCUPATIONAL HEALTH (OUTPATIENT)
Dept: URGENT CARE | Facility: CLINIC | Age: 60
End: 2020-11-25

## 2020-11-25 DIAGNOSIS — J02.9 SORE THROAT: Primary | ICD-10-CM

## 2020-11-25 DIAGNOSIS — J02.9 SORE THROAT: ICD-10-CM

## 2020-11-25 LAB
CTP QC/QA: YES
SARS-COV-2 RDRP RESP QL NAA+PROBE: NEGATIVE

## 2020-11-25 PROCEDURE — U0002 COVID-19 LAB TEST NON-CDC: HCPCS | Mod: QW,S$GLB,, | Performed by: INTERNAL MEDICINE

## 2020-11-25 PROCEDURE — U0002: ICD-10-PCS | Mod: QW,S$GLB,, | Performed by: INTERNAL MEDICINE

## 2020-11-25 NOTE — PROGRESS NOTES
[facility] Employee Rapid covid Swab    CDC Testing and Quarantine Guidelines for Exposure:    A close exposure is defined as anyone who had a masked or an unmasked exposure to a known COVID -19 positive person, at less than 6 ft for more than 15 minutes. If your exposure meets this definition, then you are required to quarantine for 14 days per the CDC. They now recommend that a test can be performed if you are asymptomatic (someone who does not have any symptoms), and a test should be done if you develop symptoms after an exposure as described above.         If you meet the definition of a close exposure, it does not matter whether or not you are asymptomatic or symptomatic - A NEGATIVE TEST DOES NOT GET YOU OUT OF 14 DAYS OF QUARANTINE!         Please note that if you are asymptomatic and wait more than 4 days to test after an exposure, you risk lengthening your quarantine. This is because if you test positive as an asymptomatic, your isolation is 10 days from the date of the positive test, not the date of exposure. So for example, if you test positive as an asymptomatic on day 7 from exposure, you have now extended your 14 day quarantine to a 17 day isolation.         If your exposure does not meet the above definition, you may return to your normal activities including social distancing, wearing masks, and frequent handwashing.

## 2020-12-19 ENCOUNTER — PATIENT MESSAGE (OUTPATIENT)
Dept: OBSTETRICS AND GYNECOLOGY | Facility: CLINIC | Age: 60
End: 2020-12-19

## 2020-12-21 ENCOUNTER — IMMUNIZATION (OUTPATIENT)
Dept: INTERNAL MEDICINE | Facility: CLINIC | Age: 60
End: 2020-12-21
Payer: COMMERCIAL

## 2020-12-21 DIAGNOSIS — Z23 NEED FOR VACCINATION: ICD-10-CM

## 2020-12-21 PROCEDURE — 0001A COVID-19, MRNA, LNP-S, PF, 30 MCG/0.3 ML DOSE VACCINE: ICD-10-PCS | Mod: CV19,,, | Performed by: INTERNAL MEDICINE

## 2020-12-21 PROCEDURE — 91300 COVID-19, MRNA, LNP-S, PF, 30 MCG/0.3 ML DOSE VACCINE: ICD-10-PCS | Mod: ,,, | Performed by: INTERNAL MEDICINE

## 2020-12-21 PROCEDURE — 0001A COVID-19, MRNA, LNP-S, PF, 30 MCG/0.3 ML DOSE VACCINE: CPT | Mod: CV19,,, | Performed by: INTERNAL MEDICINE

## 2020-12-21 PROCEDURE — 91300 COVID-19, MRNA, LNP-S, PF, 30 MCG/0.3 ML DOSE VACCINE: CPT | Mod: ,,, | Performed by: INTERNAL MEDICINE

## 2020-12-22 ENCOUNTER — HOSPITAL ENCOUNTER (OUTPATIENT)
Dept: RADIOLOGY | Facility: HOSPITAL | Age: 60
Discharge: HOME | End: 2020-12-22
Attending: OBSTETRICS & GYNECOLOGY
Payer: COMMERCIAL

## 2020-12-22 DIAGNOSIS — Z12.31 VISIT FOR SCREENING MAMMOGRAM: ICD-10-CM

## 2020-12-22 PROCEDURE — 77067 SCR MAMMO BI INCL CAD: CPT | Mod: TC

## 2020-12-22 PROCEDURE — 77067 MAMMO DIGITAL SCREENING BILAT WITH TOMOSYNTHESIS_CAD: ICD-10-PCS | Mod: 26,,, | Performed by: RADIOLOGY

## 2020-12-22 PROCEDURE — 77063 MAMMO DIGITAL SCREENING BILAT WITH TOMOSYNTHESIS_CAD: ICD-10-PCS | Mod: 26,,, | Performed by: RADIOLOGY

## 2020-12-22 PROCEDURE — 77067 SCR MAMMO BI INCL CAD: CPT | Mod: 26,,, | Performed by: RADIOLOGY

## 2020-12-22 PROCEDURE — 77063 BREAST TOMOSYNTHESIS BI: CPT | Mod: 26,,, | Performed by: RADIOLOGY

## 2020-12-23 ENCOUNTER — PATIENT MESSAGE (OUTPATIENT)
Dept: OBSTETRICS AND GYNECOLOGY | Facility: CLINIC | Age: 60
End: 2020-12-23

## 2021-01-11 ENCOUNTER — IMMUNIZATION (OUTPATIENT)
Dept: INTERNAL MEDICINE | Facility: CLINIC | Age: 61
End: 2021-01-11
Payer: COMMERCIAL

## 2021-01-11 DIAGNOSIS — Z23 NEED FOR VACCINATION: ICD-10-CM

## 2021-01-11 PROCEDURE — 0002A COVID-19, MRNA, LNP-S, PF, 30 MCG/0.3 ML DOSE VACCINE: CPT | Mod: PBBFAC

## 2021-01-11 PROCEDURE — 91300 COVID-19, MRNA, LNP-S, PF, 30 MCG/0.3 ML DOSE VACCINE: CPT | Mod: PBBFAC

## 2021-04-05 ENCOUNTER — PATIENT MESSAGE (OUTPATIENT)
Dept: ADMINISTRATIVE | Facility: HOSPITAL | Age: 61
End: 2021-04-05

## 2021-07-06 ENCOUNTER — PATIENT MESSAGE (OUTPATIENT)
Dept: ADMINISTRATIVE | Facility: HOSPITAL | Age: 61
End: 2021-07-06

## 2021-08-04 ENCOUNTER — TELEPHONE (OUTPATIENT)
Dept: INTERNAL MEDICINE | Facility: CLINIC | Age: 61
End: 2021-08-04

## 2021-08-05 ENCOUNTER — OFFICE VISIT (OUTPATIENT)
Dept: INTERNAL MEDICINE | Facility: CLINIC | Age: 61
End: 2021-08-05
Payer: COMMERCIAL

## 2021-08-05 VITALS
SYSTOLIC BLOOD PRESSURE: 110 MMHG | TEMPERATURE: 98 F | RESPIRATION RATE: 18 BRPM | HEART RATE: 71 BPM | DIASTOLIC BLOOD PRESSURE: 70 MMHG | OXYGEN SATURATION: 96 % | WEIGHT: 158.75 LBS | BODY MASS INDEX: 25.51 KG/M2 | HEIGHT: 66 IN

## 2021-08-05 DIAGNOSIS — F41.9 ANXIETY: ICD-10-CM

## 2021-08-05 DIAGNOSIS — R00.2 PALPITATIONS: ICD-10-CM

## 2021-08-05 DIAGNOSIS — G47.33 OSA (OBSTRUCTIVE SLEEP APNEA): ICD-10-CM

## 2021-08-05 DIAGNOSIS — R53.83 FATIGUE, UNSPECIFIED TYPE: Primary | ICD-10-CM

## 2021-08-05 PROCEDURE — 99999 PR PBB SHADOW E&M-EST. PATIENT-LVL III: CPT | Mod: PBBFAC,,, | Performed by: INTERNAL MEDICINE

## 2021-08-05 PROCEDURE — 99999 PR PBB SHADOW E&M-EST. PATIENT-LVL III: ICD-10-PCS | Mod: PBBFAC,,, | Performed by: INTERNAL MEDICINE

## 2021-08-05 PROCEDURE — 99214 OFFICE O/P EST MOD 30 MIN: CPT | Mod: S$GLB,,, | Performed by: INTERNAL MEDICINE

## 2021-08-05 PROCEDURE — 99214 PR OFFICE/OUTPT VISIT, EST, LEVL IV, 30-39 MIN: ICD-10-PCS | Mod: S$GLB,,, | Performed by: INTERNAL MEDICINE

## 2021-08-05 RX ORDER — ALPRAZOLAM 0.25 MG/1
0.25 TABLET ORAL 2 TIMES DAILY PRN
Qty: 20 TABLET | Refills: 0 | Status: SHIPPED | OUTPATIENT
Start: 2021-08-05 | End: 2021-12-06 | Stop reason: SDUPTHER

## 2021-08-09 ENCOUNTER — TELEPHONE (OUTPATIENT)
Dept: SLEEP MEDICINE | Facility: OTHER | Age: 61
End: 2021-08-09

## 2021-10-01 NOTE — PROGRESS NOTES
Subjective:       Patient ID: Jacek Duran is a 58 y.o. female.    Chief Complaint: Follow-up    HPI     58-year-old female here for 3 month follow-up.    HLD - Patient is currently on no medication.  Her last lipid panel was   Cholesterol   Date Value Ref Range Status   03/25/2019 243 (H) 120 - 199 mg/dL Final     Comment:     The National Cholesterol Education Program (NCEP) has set the  following guidelines (reference ranges) for Cholesterol:  Optimal.....................<200 mg/dL  Borderline High.............200-239 mg/dL  High........................> or = 240 mg/dL       Triglycerides   Date Value Ref Range Status   03/25/2019 65 30 - 150 mg/dL Final     Comment:     The National Cholesterol Education Program (NCEP) has set the  following guidelines (reference values) for triglycerides:  Normal......................<150 mg/dL  Borderline High.............150-199 mg/dL  High........................200-499 mg/dL       HDL   Date Value Ref Range Status   03/25/2019 82 (H) 40 - 75 mg/dL Final     Comment:     The National Cholesterol Education Program (NCEP) has set the  following guidelines (reference values) for HDL Cholesterol:  Low...............<40 mg/dL  Optimal...........>60 mg/dL       LDL Cholesterol   Date Value Ref Range Status   03/25/2019 148.0 63.0 - 159.0 mg/dL Final     Comment:     The National Cholesterol Education Program (NCEP) has set the  following guidelines (reference values) for LDL Cholesterol:  Optimal.......................<130 mg/dL  Borderline High...............130-159 mg/dL  High..........................160-189 mg/dL  Very High.....................>190 mg/dL     .  Side effects of the medication: none.    ASCVD 1.8%.  She has stopped exercising the last two weeks because of surgery.  She has been trying to eat kale in smoothies.  She was exercising - treadmill and weights.  She goes for hour long walks.  She goes to the gym and walks 30 minutes on the treadmill.  She lifts  weights for about 20 minutes.    Review of Systems   Constitutional: Negative for activity change and unexpected weight change.   HENT: Negative for hearing loss, rhinorrhea and trouble swallowing.    Eyes: Negative for discharge and visual disturbance.   Respiratory: Negative for chest tightness and wheezing.    Cardiovascular: Negative for chest pain and palpitations.   Gastrointestinal: Negative for blood in stool, constipation, diarrhea and vomiting.   Endocrine: Negative for polydipsia and polyuria.   Genitourinary: Negative for difficulty urinating and hematuria.   Musculoskeletal: Negative for neck pain.   Neurological: Negative for headaches.   Psychiatric/Behavioral: Negative for dysphoric mood.       Objective:      Physical Exam   Constitutional: She is oriented to person, place, and time. She appears well-developed and well-nourished.   HENT:   Head: Normocephalic and atraumatic.   Mouth/Throat: No oropharyngeal exudate.   Eyes: Pupils are equal, round, and reactive to light. EOM are normal. Right eye exhibits no discharge. Left eye exhibits no discharge. No scleral icterus.   Neck: Normal range of motion. Neck supple. No tracheal deviation present. No thyromegaly present.   Cardiovascular: Normal rate, regular rhythm and normal heart sounds. Exam reveals no gallop and no friction rub.   No murmur heard.  Pulmonary/Chest: Effort normal and breath sounds normal. No respiratory distress. She has no wheezes. She has no rales. She exhibits no tenderness.   Abdominal: Soft. Bowel sounds are normal. She exhibits no distension and no mass. There is no tenderness. There is no rebound and no guarding.   Musculoskeletal: Normal range of motion. She exhibits no edema or tenderness.   Neurological: She is alert and oriented to person, place, and time.   Skin: Skin is warm and dry. No rash noted. No erythema. No pallor.   Psychiatric: She has a normal mood and affect. Her behavior is normal.   Vitals reviewed.       Assessment:       1. Hyperlipidemia, unspecified hyperlipidemia type    2. Exercise counseling        Plan:       1/2.  Patient has managed to decrease cholesterol significantly with exercise.  Advised patient to continue with her exercise program as well as trying to modify her diet.  No medication at this point.        declines

## 2021-11-03 ENCOUNTER — TELEPHONE (OUTPATIENT)
Dept: INTERNAL MEDICINE | Facility: CLINIC | Age: 61
End: 2021-11-03
Payer: COMMERCIAL

## 2021-12-07 RX ORDER — ALPRAZOLAM 0.25 MG/1
0.25 TABLET ORAL 2 TIMES DAILY PRN
Qty: 20 TABLET | Refills: 0 | Status: SHIPPED | OUTPATIENT
Start: 2021-12-07 | End: 2022-05-12 | Stop reason: SDUPTHER

## 2022-02-09 LAB — CRC RECOMMENDATION EXT: NORMAL

## 2022-03-14 DIAGNOSIS — Z12.31 OTHER SCREENING MAMMOGRAM: ICD-10-CM

## 2022-04-12 RX ORDER — ALPRAZOLAM 0.25 MG/1
0.25 TABLET ORAL 2 TIMES DAILY PRN
Qty: 20 TABLET | Refills: 0 | OUTPATIENT
Start: 2022-04-12 | End: 2022-05-12

## 2022-04-14 ENCOUNTER — PATIENT MESSAGE (OUTPATIENT)
Dept: INTERNAL MEDICINE | Facility: CLINIC | Age: 62
End: 2022-04-14
Payer: COMMERCIAL

## 2022-04-14 RX ORDER — ALPRAZOLAM 0.25 MG/1
0.25 TABLET ORAL 2 TIMES DAILY PRN
Qty: 20 TABLET | Refills: 0 | OUTPATIENT
Start: 2022-04-14 | End: 2022-05-14

## 2022-04-14 RX ORDER — ACYCLOVIR 400 MG/1
400 TABLET ORAL 3 TIMES DAILY
Qty: 21 TABLET | Refills: 0 | Status: SHIPPED | OUTPATIENT
Start: 2022-04-14 | End: 2022-06-01

## 2022-05-12 ENCOUNTER — OFFICE VISIT (OUTPATIENT)
Dept: INTERNAL MEDICINE | Facility: CLINIC | Age: 62
End: 2022-05-12
Payer: COMMERCIAL

## 2022-05-12 ENCOUNTER — PATIENT OUTREACH (OUTPATIENT)
Dept: ADMINISTRATIVE | Facility: HOSPITAL | Age: 62
End: 2022-05-12
Payer: COMMERCIAL

## 2022-05-12 ENCOUNTER — LAB VISIT (OUTPATIENT)
Dept: LAB | Facility: HOSPITAL | Age: 62
End: 2022-05-12
Attending: INTERNAL MEDICINE
Payer: COMMERCIAL

## 2022-05-12 ENCOUNTER — PATIENT MESSAGE (OUTPATIENT)
Dept: INTERNAL MEDICINE | Facility: CLINIC | Age: 62
End: 2022-05-12

## 2022-05-12 ENCOUNTER — TELEPHONE (OUTPATIENT)
Dept: INTERNAL MEDICINE | Facility: CLINIC | Age: 62
End: 2022-05-12

## 2022-05-12 VITALS
HEIGHT: 66 IN | OXYGEN SATURATION: 96 % | RESPIRATION RATE: 17 BRPM | BODY MASS INDEX: 24.8 KG/M2 | DIASTOLIC BLOOD PRESSURE: 78 MMHG | WEIGHT: 154.31 LBS | SYSTOLIC BLOOD PRESSURE: 118 MMHG | HEART RATE: 80 BPM

## 2022-05-12 DIAGNOSIS — Z56.6 STRESS AT WORK: ICD-10-CM

## 2022-05-12 DIAGNOSIS — G47.33 OSA (OBSTRUCTIVE SLEEP APNEA): ICD-10-CM

## 2022-05-12 DIAGNOSIS — Z11.4 ENCOUNTER FOR SCREENING FOR HIV: ICD-10-CM

## 2022-05-12 DIAGNOSIS — R31.9 HEMATURIA, UNSPECIFIED TYPE: ICD-10-CM

## 2022-05-12 DIAGNOSIS — R31.9 HEMATURIA, UNSPECIFIED TYPE: Primary | ICD-10-CM

## 2022-05-12 DIAGNOSIS — E78.5 HYPERLIPIDEMIA, UNSPECIFIED HYPERLIPIDEMIA TYPE: Chronic | ICD-10-CM

## 2022-05-12 DIAGNOSIS — Z00.00 ANNUAL PHYSICAL EXAM: Primary | ICD-10-CM

## 2022-05-12 DIAGNOSIS — K76.0 NAFLD (NONALCOHOLIC FATTY LIVER DISEASE): Chronic | ICD-10-CM

## 2022-05-12 DIAGNOSIS — Z00.00 ANNUAL PHYSICAL EXAM: ICD-10-CM

## 2022-05-12 DIAGNOSIS — Z12.11 SCREEN FOR COLON CANCER: ICD-10-CM

## 2022-05-12 DIAGNOSIS — Z12.31 VISIT FOR SCREENING MAMMOGRAM: ICD-10-CM

## 2022-05-12 LAB
ALBUMIN SERPL BCP-MCNC: 4 G/DL (ref 3.5–5.2)
ALP SERPL-CCNC: 77 U/L (ref 55–135)
ALT SERPL W/O P-5'-P-CCNC: 21 U/L (ref 10–44)
ANION GAP SERPL CALC-SCNC: 11 MMOL/L (ref 8–16)
AST SERPL-CCNC: 20 U/L (ref 10–40)
BASOPHILS # BLD AUTO: 0.02 K/UL (ref 0–0.2)
BASOPHILS NFR BLD: 0.4 % (ref 0–1.9)
BILIRUB SERPL-MCNC: 0.6 MG/DL (ref 0.1–1)
BUN SERPL-MCNC: 17 MG/DL (ref 6–20)
CALCIUM SERPL-MCNC: 9.7 MG/DL (ref 8.7–10.5)
CHLORIDE SERPL-SCNC: 104 MMOL/L (ref 95–110)
CHOLEST SERPL-MCNC: 235 MG/DL (ref 120–199)
CHOLEST/HDLC SERPL: 2.8 {RATIO} (ref 2–5)
CO2 SERPL-SCNC: 24 MMOL/L (ref 23–29)
CREAT SERPL-MCNC: 0.8 MG/DL (ref 0.5–1.4)
DIFFERENTIAL METHOD: NORMAL
EOSINOPHIL # BLD AUTO: 0.1 K/UL (ref 0–0.5)
EOSINOPHIL NFR BLD: 1.8 % (ref 0–8)
ERYTHROCYTE [DISTWIDTH] IN BLOOD BY AUTOMATED COUNT: 12.1 % (ref 11.5–14.5)
EST. GFR  (AFRICAN AMERICAN): >60 ML/MIN/1.73 M^2
EST. GFR  (NON AFRICAN AMERICAN): >60 ML/MIN/1.73 M^2
ESTIMATED AVG GLUCOSE: 105 MG/DL (ref 68–131)
GLUCOSE SERPL-MCNC: 121 MG/DL (ref 70–110)
HBA1C MFR BLD: 5.3 % (ref 4–5.6)
HCT VFR BLD AUTO: 37.4 % (ref 37–48.5)
HDLC SERPL-MCNC: 85 MG/DL (ref 40–75)
HDLC SERPL: 36.2 % (ref 20–50)
HGB BLD-MCNC: 12.5 G/DL (ref 12–16)
IMM GRANULOCYTES # BLD AUTO: 0.01 K/UL (ref 0–0.04)
IMM GRANULOCYTES NFR BLD AUTO: 0.2 % (ref 0–0.5)
LDLC SERPL CALC-MCNC: 136.8 MG/DL (ref 63–159)
LYMPHOCYTES # BLD AUTO: 1.3 K/UL (ref 1–4.8)
LYMPHOCYTES NFR BLD: 26.1 % (ref 18–48)
MCH RBC QN AUTO: 29.1 PG (ref 27–31)
MCHC RBC AUTO-ENTMCNC: 33.4 G/DL (ref 32–36)
MCV RBC AUTO: 87 FL (ref 82–98)
MONOCYTES # BLD AUTO: 0.4 K/UL (ref 0.3–1)
MONOCYTES NFR BLD: 9 % (ref 4–15)
NEUTROPHILS # BLD AUTO: 3.1 K/UL (ref 1.8–7.7)
NEUTROPHILS NFR BLD: 62.5 % (ref 38–73)
NONHDLC SERPL-MCNC: 150 MG/DL
NRBC BLD-RTO: 0 /100 WBC
PLATELET # BLD AUTO: 251 K/UL (ref 150–450)
PMV BLD AUTO: 11.2 FL (ref 9.2–12.9)
POTASSIUM SERPL-SCNC: 4 MMOL/L (ref 3.5–5.1)
PROT SERPL-MCNC: 7.7 G/DL (ref 6–8.4)
RBC # BLD AUTO: 4.3 M/UL (ref 4–5.4)
SODIUM SERPL-SCNC: 139 MMOL/L (ref 136–145)
TRIGL SERPL-MCNC: 66 MG/DL (ref 30–150)
TSH SERPL DL<=0.005 MIU/L-ACNC: 1.38 UIU/ML (ref 0.4–4)
WBC # BLD AUTO: 4.91 K/UL (ref 3.9–12.7)

## 2022-05-12 PROCEDURE — 87389 HIV-1 AG W/HIV-1&-2 AB AG IA: CPT | Performed by: INTERNAL MEDICINE

## 2022-05-12 PROCEDURE — 83036 HEMOGLOBIN GLYCOSYLATED A1C: CPT | Performed by: INTERNAL MEDICINE

## 2022-05-12 PROCEDURE — 3008F BODY MASS INDEX DOCD: CPT | Mod: CPTII,S$GLB,, | Performed by: INTERNAL MEDICINE

## 2022-05-12 PROCEDURE — 1160F RVW MEDS BY RX/DR IN RCRD: CPT | Mod: CPTII,S$GLB,, | Performed by: INTERNAL MEDICINE

## 2022-05-12 PROCEDURE — 99999 PR PBB SHADOW E&M-EST. PATIENT-LVL IV: ICD-10-PCS | Mod: PBBFAC,,, | Performed by: INTERNAL MEDICINE

## 2022-05-12 PROCEDURE — 3074F SYST BP LT 130 MM HG: CPT | Mod: CPTII,S$GLB,, | Performed by: INTERNAL MEDICINE

## 2022-05-12 PROCEDURE — 3074F PR MOST RECENT SYSTOLIC BLOOD PRESSURE < 130 MM HG: ICD-10-PCS | Mod: CPTII,S$GLB,, | Performed by: INTERNAL MEDICINE

## 2022-05-12 PROCEDURE — 99999 PR PBB SHADOW E&M-EST. PATIENT-LVL IV: CPT | Mod: PBBFAC,,, | Performed by: INTERNAL MEDICINE

## 2022-05-12 PROCEDURE — 85025 COMPLETE CBC W/AUTO DIFF WBC: CPT | Performed by: INTERNAL MEDICINE

## 2022-05-12 PROCEDURE — 1160F PR REVIEW ALL MEDS BY PRESCRIBER/CLIN PHARMACIST DOCUMENTED: ICD-10-PCS | Mod: CPTII,S$GLB,, | Performed by: INTERNAL MEDICINE

## 2022-05-12 PROCEDURE — 36415 COLL VENOUS BLD VENIPUNCTURE: CPT | Mod: PO | Performed by: INTERNAL MEDICINE

## 2022-05-12 PROCEDURE — 80061 LIPID PANEL: CPT | Performed by: INTERNAL MEDICINE

## 2022-05-12 PROCEDURE — 84443 ASSAY THYROID STIM HORMONE: CPT | Performed by: INTERNAL MEDICINE

## 2022-05-12 PROCEDURE — 3078F DIAST BP <80 MM HG: CPT | Mod: CPTII,S$GLB,, | Performed by: INTERNAL MEDICINE

## 2022-05-12 PROCEDURE — 99396 PREV VISIT EST AGE 40-64: CPT | Mod: S$GLB,,, | Performed by: INTERNAL MEDICINE

## 2022-05-12 PROCEDURE — 3078F PR MOST RECENT DIASTOLIC BLOOD PRESSURE < 80 MM HG: ICD-10-PCS | Mod: CPTII,S$GLB,, | Performed by: INTERNAL MEDICINE

## 2022-05-12 PROCEDURE — 80053 COMPREHEN METABOLIC PANEL: CPT | Performed by: INTERNAL MEDICINE

## 2022-05-12 PROCEDURE — 3008F PR BODY MASS INDEX (BMI) DOCUMENTED: ICD-10-PCS | Mod: CPTII,S$GLB,, | Performed by: INTERNAL MEDICINE

## 2022-05-12 PROCEDURE — 1159F PR MEDICATION LIST DOCUMENTED IN MEDICAL RECORD: ICD-10-PCS | Mod: CPTII,S$GLB,, | Performed by: INTERNAL MEDICINE

## 2022-05-12 PROCEDURE — 99396 PR PREVENTIVE VISIT,EST,40-64: ICD-10-PCS | Mod: S$GLB,,, | Performed by: INTERNAL MEDICINE

## 2022-05-12 PROCEDURE — 1159F MED LIST DOCD IN RCRD: CPT | Mod: CPTII,S$GLB,, | Performed by: INTERNAL MEDICINE

## 2022-05-12 RX ORDER — CHOLECALCIFEROL (VITAMIN D3) 25 MCG
1000 TABLET ORAL
COMMUNITY
End: 2024-03-10

## 2022-05-12 RX ORDER — ALPRAZOLAM 0.25 MG/1
0.25 TABLET ORAL 2 TIMES DAILY PRN
Qty: 20 TABLET | Refills: 0 | Status: SHIPPED | OUTPATIENT
Start: 2022-05-12 | End: 2022-06-01

## 2022-05-12 RX ORDER — VARICELLA-ZOSTER GE VAC,2 OF 2 50 MCG
1 VIAL (EA) INTRAMUSCULAR ONCE
Qty: 1 EACH | Refills: 1 | Status: SHIPPED | OUTPATIENT
Start: 2022-05-12 | End: 2022-05-12

## 2022-05-12 SDOH — SOCIAL DETERMINANTS OF HEALTH (SDOH): OTHER PHYSICAL AND MENTAL STRAIN RELATED TO WORK: Z56.6

## 2022-05-12 NOTE — LETTER
AUTHORIZATION FOR RELEASE OF   CONFIDENTIAL INFORMATION    Dear Medical Records,    We are seeing Jacek Duran, date of birth 1960, in the clinic at [facility]. Naren Hernandez MD is the patient's PCP. Jacek Duran has an outstanding lab/procedure at the time we reviewed her chart. In order to help keep her health information updated, she has authorized us to request the following medical record(s):        (x  )  MAMMOGRAM                                    (  )  COLONOSCOPY      (  )  PAP SMEAR                                          (  )  OUTSIDE LAB RESULTS     (  )  DEXA SCAN                                          (  )  EYE EXAM            (  )  FOOT EXAM                                          (  )  ENTIRE RECORD     (  )  OUTSIDE IMMUNIZATIONS                 (  )  _______________         Please fax records to [facility], Ryan Lee, MD, [phone]     If you have any questions, please contact RIGO Rayo at [phone]          Patient Name: Jacek Duran  : 1960  Patient Phone #: [phone]

## 2022-05-12 NOTE — Clinical Note
----- Message from Jonathan Cullen sent at 7/18/2017  2:53 PM CDT -----  Pt is requesting a call from nurse to discuss home health services.            Please call pt back at [phone]   Please get MMG from [facility]. C-scope record from Dr. Tobar.

## 2022-05-12 NOTE — LETTER
AUTHORIZATION FOR RELEASE OF   CONFIDENTIAL INFORMATION    Dear Medical Records,    We are seeing Jacek Duran, date of birth 1960, in the clinic at [facility]. Naren Hernandez MD is the patient's PCP. Jacek Duran has an outstanding lab/procedure at the time we reviewed her chart. In order to help keep her health information updated, she has authorized us to request the following medical record(s):        (  )  MAMMOGRAM                                      ( x)  COLONOSCOPY      (  )  PAP SMEAR                                          (  )  OUTSIDE LAB RESULTS     (  )  DEXA SCAN                                          (  )  EYE EXAM            (  )  FOOT EXAM                                          (  )  ENTIRE RECORD     (  )  OUTSIDE IMMUNIZATIONS                 (  )  _______________         Please fax records to [facility], Ryan Lee, MD,[phone]     If you have any questions, please contact RIGO Rayo at [phone]          Patient Name: Jacek Duran  : 1960  Patient Phone #: [phone]

## 2022-05-12 NOTE — PROGRESS NOTES
Subjective:       Patient ID: Jacek Duran is a 61 y.o. female.    Chief Complaint: Medication Refill (Xanax ) and Annual Exam    HPI     61 y.o. female here for annual exam.     Cholesterol: needs  Vaccines: Influenza - done; Tetanus - 2018; Zoster - needs; COVID - 3 done  Sexual Screening: not active  STD screening: not active  Eye exam: done last   Mammogram: needs  Gyn exam: UTD - will see Dr. Burns.  Colonoscopy: , due; patient reports done 2 months ago with Ekaterina. - due in 10 yrs.  A1c: needs    Exercise: no regular exercise like she used to.  Work has her exhausted when she gets off. And plays with grand kids when she is off.  Diet: horrible.  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  She has tried melatonin with good effect, but this stopped working for her.    Past Medical History:   Diagnosis Date    Abnormal Pap smear     before , none since    Allergy     History of dysplastic nevus, moderate atypia 2020: Mid upper abdomen    Hyperlipidemia     Mild vitamin D deficiency     NAFLD (nonalcoholic fatty liver disease)      Past Surgical History:   Procedure Laterality Date    AUGMENTATION OF BREAST      BLEPHAROPLASTY Bilateral 3/14/2019    Procedure: BLEPHAROPLASTY;  Surgeon: Beltran Magaña III, MD;  Location: [facility] OR [address] [place] [address];  Service: Plastics;  Laterality: Bilateral;  upper    breast augmentation       SECTION, LOW TRANSVERSE      CHOLECYSTECTOMY      ENDOSCOPIC ULTRASOUND OF UPPER GASTROINTESTINAL TRACT N/A 2019    Procedure: ULTRASOUND, UPPER GI TRACT, ENDOSCOPIC;  Surgeon: Antony Taveras MD;  Location: [address]);  Service: Endoscopy;  Laterality: N/A;  PM prep    LIVER BIOPSY       Social History     Socioeconomic History    Marital status: Single    Number of children: 2   Occupational History    Occupation: [employer]     Employer: [employer]  [employer]   Tobacco Use    Smoking status: Never Smoker    Smokeless tobacco: Never Used   Substance and Sexual Activity    Alcohol use: Yes     Comment: twice a month    Drug use: No    Sexual activity: Not Currently     Partners: Male     Birth control/protection: Post-menopausal   Other Topics Concern    Are you pregnant or think you may be? No   Social History Narrative    She is exercising on and off.     Social Determinants of Health     Financial Resource Strain: Low Risk     Difficulty of Paying Living Expenses: Not hard at all   Food Insecurity: No Food Insecurity    Worried About Running Out of Food in the Last Year: Never true    Ran Out of Food in the Last Year: Never true   Transportation Needs: No Transportation Needs    Lack of Transportation (Medical): No    Lack of Transportation (Non-Medical): No   Physical Activity: Unknown    Days of Exercise per Week: Patient refused   Stress: Stress Concern Present    Feeling of Stress : Very much   Social Connections: Unknown    Frequency of Communication with Friends and Family: More than three times a week    Frequency of Social Gatherings with Friends and Family: More than three times a week    Active Member of Clubs or Organizations: No    Attends Club or Organization Meetings: Never    Marital Status:    Housing Stability: Low Risk     Unable to Pay for Housing in the Last Year: No    Number of Places Lived in the Last Year: 1    Unstable Housing in the Last Year: No     Review of patient's allergies indicates:  No Known Allergies  Elizabeth Duran had no medications administered during this visit.    Review of Systems      Objective:      Physical Exam  Vitals reviewed.   Constitutional:       Appearance: She is well-developed.   HENT:      Head: Normocephalic and atraumatic.      Mouth/Throat:      Pharynx: No oropharyngeal exudate.   Eyes:      General: No scleral icterus.        Right eye: No discharge.         Left eye:  No discharge.      Pupils: Pupils are equal, round, and reactive to light.   Neck:      Thyroid: No thyromegaly.      Trachea: No tracheal deviation.   Cardiovascular:      Rate and Rhythm: Normal rate and regular rhythm.      Heart sounds: Normal heart sounds. No murmur heard.    No friction rub. No gallop.   Pulmonary:      Effort: Pulmonary effort is normal. No respiratory distress.      Breath sounds: Normal breath sounds. No wheezing or rales.   Chest:      Chest wall: No tenderness.   Abdominal:      General: Bowel sounds are normal. There is no distension.      Palpations: Abdomen is soft. There is no mass.      Tenderness: There is no abdominal tenderness. There is no guarding or rebound.   Musculoskeletal:         General: No tenderness. Normal range of motion.      Cervical back: Normal range of motion and neck supple.   Skin:     General: Skin is warm and dry.      Coloration: Skin is not pale.      Findings: No erythema or rash.   Neurological:      Mental Status: She is alert and oriented to person, place, and time.   Psychiatric:         Behavior: Behavior normal.         Assessment:       1. Annual physical exam  - CBC Auto Differential; Future  - Comprehensive Metabolic Panel; Future  - TSH; Future  - Lipid Panel; Future  - Hemoglobin A1C; Future    2. Hyperlipidemia, unspecified hyperlipidemia type    3. NAFLD (nonalcoholic fatty liver disease)    4. Screen for colon cancer    5. Visit for screening mammogram    6. CORTNEY (obstructive sleep apnea)  - Ambulatory referral/consult to Sleep Disorders; Future    7. Encounter for screening for HIV  - HIV 1/2 Ag/Ab (4th Gen); Future    8. Hematuria, unspecified type  - Urinalysis; Future    9. Stress at work      Plan:       1.  CBC, CMP, TSH, lipids, A1c.  Discussed diet and exercise.  Up-to-date on vaccines.  Shingles vaccine sent to pharmacy.  2. Monitor.  3. Monitor.  4. Plan to get colonoscopy records.  5. plan to get mammogram records.  6. Refer to  Sleep Medicine.    7. Check HIV.  8. Check urinalysis.  9. Refill of Xanax 0.25 mg as needed given.

## 2022-05-13 ENCOUNTER — PATIENT MESSAGE (OUTPATIENT)
Dept: INTERNAL MEDICINE | Facility: CLINIC | Age: 62
End: 2022-05-13
Payer: COMMERCIAL

## 2022-05-13 ENCOUNTER — TELEPHONE (OUTPATIENT)
Dept: INTERNAL MEDICINE | Facility: CLINIC | Age: 62
End: 2022-05-13
Payer: COMMERCIAL

## 2022-05-13 NOTE — TELEPHONE ENCOUNTER
----- Message from Shannan Magallon sent at 5/13/2022  7:44 AM CDT -----  Contact: Pt Mobile [phone]  Patient would like a call back in regards to the message that she sent to you on last night please.

## 2022-05-13 NOTE — TELEPHONE ENCOUNTER
Patient forgot to mention yesterday she has been having a sharp pain behind her left breast. Would like advice

## 2022-05-16 ENCOUNTER — TELEPHONE (OUTPATIENT)
Dept: INTERNAL MEDICINE | Facility: CLINIC | Age: 62
End: 2022-05-16
Payer: COMMERCIAL

## 2022-05-16 NOTE — TELEPHONE ENCOUNTER
----- Message from Helen Morel sent at 5/13/2022  2:11 PM CDT -----  Contact: Self/[phone]  Pt said that she is calling In regards to needing to get her Mammogram order faxed Lee Humphries at:[phone]. Please advise

## 2022-05-16 NOTE — TELEPHONE ENCOUNTER
Pt request mammo order to be done at [facility]  Please fax Trice Humphries at:[phone]  mammo last done:12/22/2020

## 2022-05-17 ENCOUNTER — PATIENT MESSAGE (OUTPATIENT)
Dept: INTERNAL MEDICINE | Facility: CLINIC | Age: 62
End: 2022-05-17
Payer: COMMERCIAL

## 2022-05-17 ENCOUNTER — PATIENT OUTREACH (OUTPATIENT)
Dept: ADMINISTRATIVE | Facility: OTHER | Age: 62
End: 2022-05-17
Payer: COMMERCIAL

## 2022-05-17 DIAGNOSIS — R31.9 HEMATURIA, UNSPECIFIED TYPE: Primary | ICD-10-CM

## 2022-05-17 LAB — HIV 1+2 AB+HIV1 P24 AG SERPL QL IA: NEGATIVE

## 2022-05-17 NOTE — PROGRESS NOTES
Health Maintenance Due   Topic Date Due    HIV Screening  Never done    Shingles Vaccine (1 of 2) Never done    Mammogram  12/22/2021    COVID-19 Vaccine (4 - Booster for Pfizer series) 03/23/2022     Updates were requested from care everywhere.  DIS/Chart was reviewed for overdue Proactive [facility] Encounters ([facility]) topics (CRS, Breast Cancer Screening, Eye exam)  Health Maintenance has been updated.  LINKS immunization registry triggered.  Immunizations were reconciled.

## 2022-05-18 ENCOUNTER — PATIENT MESSAGE (OUTPATIENT)
Dept: INTERNAL MEDICINE | Facility: CLINIC | Age: 62
End: 2022-05-18
Payer: COMMERCIAL

## 2022-05-18 DIAGNOSIS — N64.4 BREAST PAIN: Primary | ICD-10-CM

## 2022-05-30 ENCOUNTER — PATIENT MESSAGE (OUTPATIENT)
Dept: ADMINISTRATIVE | Facility: HOSPITAL | Age: 62
End: 2022-05-30
Payer: COMMERCIAL

## 2022-06-01 ENCOUNTER — OFFICE VISIT (OUTPATIENT)
Dept: OBSTETRICS AND GYNECOLOGY | Facility: CLINIC | Age: 62
End: 2022-06-01
Payer: COMMERCIAL

## 2022-06-01 VITALS
BODY MASS INDEX: 24.66 KG/M2 | WEIGHT: 153.44 LBS | HEIGHT: 66 IN | SYSTOLIC BLOOD PRESSURE: 120 MMHG | DIASTOLIC BLOOD PRESSURE: 70 MMHG

## 2022-06-01 DIAGNOSIS — Z12.4 PAP SMEAR FOR CERVICAL CANCER SCREENING: ICD-10-CM

## 2022-06-01 DIAGNOSIS — Z78.0 POSTMENOPAUSAL STATUS: ICD-10-CM

## 2022-06-01 DIAGNOSIS — Z01.419 WOMEN'S ANNUAL ROUTINE GYNECOLOGICAL EXAMINATION: Primary | ICD-10-CM

## 2022-06-01 DIAGNOSIS — Z12.31 ENCOUNTER FOR SCREENING MAMMOGRAM FOR MALIGNANT NEOPLASM OF BREAST: ICD-10-CM

## 2022-06-01 PROCEDURE — 3008F BODY MASS INDEX DOCD: CPT | Mod: CPTII,S$GLB,, | Performed by: OBSTETRICS & GYNECOLOGY

## 2022-06-01 PROCEDURE — 99999 PR PBB SHADOW E&M-EST. PATIENT-LVL III: CPT | Mod: PBBFAC,,, | Performed by: OBSTETRICS & GYNECOLOGY

## 2022-06-01 PROCEDURE — 1160F PR REVIEW ALL MEDS BY PRESCRIBER/CLIN PHARMACIST DOCUMENTED: ICD-10-PCS | Mod: CPTII,S$GLB,, | Performed by: OBSTETRICS & GYNECOLOGY

## 2022-06-01 PROCEDURE — 99396 PREV VISIT EST AGE 40-64: CPT | Mod: S$GLB,,, | Performed by: OBSTETRICS & GYNECOLOGY

## 2022-06-01 PROCEDURE — 87624 HPV HI-RISK TYP POOLED RSLT: CPT | Performed by: OBSTETRICS & GYNECOLOGY

## 2022-06-01 PROCEDURE — 1159F MED LIST DOCD IN RCRD: CPT | Mod: CPTII,S$GLB,, | Performed by: OBSTETRICS & GYNECOLOGY

## 2022-06-01 PROCEDURE — 88175 CYTOPATH C/V AUTO FLUID REDO: CPT | Performed by: OBSTETRICS & GYNECOLOGY

## 2022-06-01 PROCEDURE — 3008F PR BODY MASS INDEX (BMI) DOCUMENTED: ICD-10-PCS | Mod: CPTII,S$GLB,, | Performed by: OBSTETRICS & GYNECOLOGY

## 2022-06-01 PROCEDURE — 3074F PR MOST RECENT SYSTOLIC BLOOD PRESSURE < 130 MM HG: ICD-10-PCS | Mod: CPTII,S$GLB,, | Performed by: OBSTETRICS & GYNECOLOGY

## 2022-06-01 PROCEDURE — 1159F PR MEDICATION LIST DOCUMENTED IN MEDICAL RECORD: ICD-10-PCS | Mod: CPTII,S$GLB,, | Performed by: OBSTETRICS & GYNECOLOGY

## 2022-06-01 PROCEDURE — 3074F SYST BP LT 130 MM HG: CPT | Mod: CPTII,S$GLB,, | Performed by: OBSTETRICS & GYNECOLOGY

## 2022-06-01 PROCEDURE — 3044F PR MOST RECENT HEMOGLOBIN A1C LEVEL <7.0%: ICD-10-PCS | Mod: CPTII,S$GLB,, | Performed by: OBSTETRICS & GYNECOLOGY

## 2022-06-01 PROCEDURE — 3078F DIAST BP <80 MM HG: CPT | Mod: CPTII,S$GLB,, | Performed by: OBSTETRICS & GYNECOLOGY

## 2022-06-01 PROCEDURE — 99999 PR PBB SHADOW E&M-EST. PATIENT-LVL III: ICD-10-PCS | Mod: PBBFAC,,, | Performed by: OBSTETRICS & GYNECOLOGY

## 2022-06-01 PROCEDURE — 1160F RVW MEDS BY RX/DR IN RCRD: CPT | Mod: CPTII,S$GLB,, | Performed by: OBSTETRICS & GYNECOLOGY

## 2022-06-01 PROCEDURE — 99396 PR PREVENTIVE VISIT,EST,40-64: ICD-10-PCS | Mod: S$GLB,,, | Performed by: OBSTETRICS & GYNECOLOGY

## 2022-06-01 PROCEDURE — 3078F PR MOST RECENT DIASTOLIC BLOOD PRESSURE < 80 MM HG: ICD-10-PCS | Mod: CPTII,S$GLB,, | Performed by: OBSTETRICS & GYNECOLOGY

## 2022-06-01 PROCEDURE — 3044F HG A1C LEVEL LT 7.0%: CPT | Mod: CPTII,S$GLB,, | Performed by: OBSTETRICS & GYNECOLOGY

## 2022-06-01 NOTE — PROGRESS NOTES
"Jacek Duran is a 61 y.o. year old  female who presents for routine GYN exam.  She is postmenopausal, not taking hormones.  Denies bleeding, hot flashes, and night sweats.  Describes having some chest discomfort several weeks ago which has now completely resolved.  Reports kidney stones.  No gyn complaints.    Blood pressure 120/70, height 5' 6" (1.676 m), weight 69.6 kg (153 lb 7 oz), last menstrual period 2012.      Past Medical History:   Diagnosis Date    Abnormal Pap smear     before , none since    Allergy     History of dysplastic nevus, moderate atypia 2020: Mid upper abdomen    Hyperlipidemia     Mild vitamin D deficiency     NAFLD (nonalcoholic fatty liver disease)        Past Surgical History:   Procedure Laterality Date    AUGMENTATION OF BREAST      BLEPHAROPLASTY Bilateral 3/14/2019    Procedure: BLEPHAROPLASTY;  Surgeon: Beltran Magaña III, MD;  Location: [address];  Service: Plastics;  Laterality: Bilateral;  upper    breast augmentation       SECTION, LOW TRANSVERSE      CHOLECYSTECTOMY      ENDOSCOPIC ULTRASOUND OF UPPER GASTROINTESTINAL TRACT N/A 2019    Procedure: ULTRASOUND, UPPER GI TRACT, ENDOSCOPIC;  Surgeon: Antony Taveras MD;  Location: [facility] ([address] [place] [address]);  Service: Endoscopy;  Laterality: N/A;  PM prep    LIVER BIOPSY         OB History        3    Para   3    Term   1            AB        Living   2       SAB        IAB        Ectopic        Multiple        Live Births   2                   ROS:  GENERAL: Feeling well overall.   SKIN: Denies rash or lesions.   HEAD: Denies head injury or headache.   NODES: Denies enlarged lymph nodes.   CHEST: Denies chest pain or shortness of breath.   CARDIOVASCULAR: Denies palpitations or left sided chest pain.   ABDOMEN: No abdominal pain, nausea, vomiting or rectal bleeding.   URINARY: No dysuria or hematuria.  REPRODUCTIVE: See HPI.   BREASTS: Denies pain, lumps, or " nipple discharge.   HEMATOLOGIC: No easy bruisability or excessive bleeding.   MUSCULOSKELETAL: Denies joint pain.  NEUROLOGIC: Denies syncope or weakness.   PSYCHIATRIC: Denies depression.     PE:   (chaperone present during entire exam)  APPEARANCE: Well nourished, well developed, in no acute distress.  BREASTS: Symmetrical.  Bilateral implants.  No palpable masses, nipple discharge or adenopathy bilaterally.  ABDOMEN: Soft. No tenderness or masses.  No CVA tenderness.  VULVA: No lesions. Normal female genitalia.  URETHRAL MEATUS: Normal size and location, no lesions, no prolapse.  URETHRA: No masses, tenderness, prolapse or scarring.  VAGINA: Atrophic.  No lesions, no abnormal discharge, no significant cystocele or rectocele.  CERVIX: No lesions and discharge. PAP done.  UTERUS: Normal size, regular shape, mobile, non-tender, bladder base nontender.  ADNEXA: No masses, tenderness or CDS nodularity.  ANUS PERINEUM: Normal.      Diagnosis:  1. Women's annual routine gynecological examination    2. Postmenopausal status    3. Pap smear for cervical cancer screening    4. Encounter for screening mammogram for malignant neoplasm of breast          PLAN:    Orders Placed This Encounter    HPV High Risk Genotypes, PCR    Mammo Digital Screening Bilat w/ Moo    Liquid-Based Pap Smear, Screening       Patient was counseled today on postmenopausal issues.  She plans to have mammogram performed at .    Follow-up in 1 year.

## 2022-06-09 LAB
FINAL PATHOLOGIC DIAGNOSIS: NORMAL
Lab: NORMAL

## 2022-06-10 ENCOUNTER — PATIENT MESSAGE (OUTPATIENT)
Dept: OBSTETRICS AND GYNECOLOGY | Facility: CLINIC | Age: 62
End: 2022-06-10
Payer: COMMERCIAL

## 2022-06-13 ENCOUNTER — TELEPHONE (OUTPATIENT)
Dept: INTERNAL MEDICINE | Facility: CLINIC | Age: 62
End: 2022-06-13
Payer: COMMERCIAL

## 2022-06-13 NOTE — TELEPHONE ENCOUNTER
Received records from [facility] community oncology research program.  Patient seen for microscopic hematuria.  Recommendation of CT urogram and return to clinic for cystoscopy.

## 2022-06-21 NOTE — TELEPHONE ENCOUNTER
----- Message from April Briggs sent at 7/11/2019 10:06 AM CDT -----  Contact: [phone]/ self   Patient called in returning your call in regards to rescheduling appt to see . Please advise.   
Rescheduled appointment for 8/8 with Dr. Taveras per patient request.  
No

## 2022-07-12 ENCOUNTER — PATIENT MESSAGE (OUTPATIENT)
Dept: ADMINISTRATIVE | Facility: HOSPITAL | Age: 62
End: 2022-07-12
Payer: COMMERCIAL

## 2022-07-15 ENCOUNTER — PATIENT OUTREACH (OUTPATIENT)
Dept: ADMINISTRATIVE | Facility: HOSPITAL | Age: 62
End: 2022-07-15
Payer: COMMERCIAL

## 2022-08-13 ENCOUNTER — PATIENT MESSAGE (OUTPATIENT)
Dept: INTERNAL MEDICINE | Facility: CLINIC | Age: 62
End: 2022-08-13
Payer: COMMERCIAL

## 2022-10-10 ENCOUNTER — PATIENT MESSAGE (OUTPATIENT)
Dept: ADMINISTRATIVE | Facility: HOSPITAL | Age: 62
End: 2022-10-10
Payer: COMMERCIAL

## 2022-10-28 ENCOUNTER — TELEPHONE (OUTPATIENT)
Dept: INTERNAL MEDICINE | Facility: CLINIC | Age: 62
End: 2022-10-28
Payer: COMMERCIAL

## 2022-10-28 NOTE — TELEPHONE ENCOUNTER
----- Message from Barbara Jensen sent at 10/28/2022  1:50 PM CDT -----  Contact: [phone]  Requesting an RX refill or new RX.  Is this a refill or new RX: refill  RX name and strength   ALPRAZolam (XANAX) 0.25 MG tablet   Is this a 30 day or 90 day RX: 30  Pharmacy name and phone # :  [id number]  [place] - [address] [facility] [address]   Phone:  [phone]  Fax:  [phone]    The doctors have asked that we provide their patients with the following 2 reminders -- prescription refills can take up to 72 hours, and a friendly reminder that in the future you can use your [facility] account to request refills: yes

## 2022-11-17 ENCOUNTER — NURSE TRIAGE (OUTPATIENT)
Dept: ADMINISTRATIVE | Facility: CLINIC | Age: 62
End: 2022-11-17
Payer: COMMERCIAL

## 2022-11-17 RX ORDER — ALPRAZOLAM 0.25 MG/1
0.25 TABLET ORAL 2 TIMES DAILY PRN
Qty: 20 TABLET | Refills: 0 | Status: SHIPPED | OUTPATIENT
Start: 2022-11-17 | End: 2023-03-08 | Stop reason: SDUPTHER

## 2022-11-17 NOTE — TELEPHONE ENCOUNTER
OOC RN   Patient reports that she feels on right side of neck will feel intermittent pulsing in her neck.   Has Apple watch   NSR.   Appt in January.   Heart Rate,   70s    halter monitor a couple of years ago.  Care advise pt wishes to be seen tomorrow.By Narne Huffman    Can you see is she can fit into your ?   Can someone reach out to patient.   If any new or worsening symptoms.   OOC RN.    Reason for Disposition   Patient wants to be seen    Additional Information   Negative: Passed out (i.e., lost consciousness, collapsed and was not responding)   Negative: Shock suspected (e.g., cold/pale/clammy skin, too weak to stand, low BP, rapid pulse)   Negative: Difficult to awaken or acting confused (e.g., disoriented, slurred speech)   Negative: Visible sweat on face or sweat dripping down face   Negative: Unable to walk, or can only walk with assistance (e.g., requires support)   Negative: Received SHOCK from implantable cardiac defibrillator and has persisting symptoms (i.e., palpitations, lightheadedness)   Negative: Dizziness, lightheadedness, or weakness and heart beating very rapidly (e.g., > 140 / minute)   Negative: Dizziness, lightheadedness, or weakness and heart beating very slowly (e.g., < 50 / minute)   Negative: Sounds like a life-threatening emergency to the triager   Negative: Chest pain   Negative: Difficulty breathing   Negative: Dizziness, lightheadedness, or weakness   Negative: Heart beating very rapidly (e.g., > 140 / minute) and present now  (Exception: During exercise.)   Negative: Heart beating very slowly (e.g., < 50 / minute)  (Exception: Athlete and heart rate normal for caller.)   Negative: New or worsened shortness of breath with activity (dyspnea on exertion)   Negative: Patient sounds very sick or weak to the triager   Negative: Wearing a 'Holter monitor' or 'cardiac event monitor'   Negative: Received SHOCK from implantable cardiac defibrillator (and now feels well)    Negative: Heart beating very rapidly (e.g., > 140 / minute) and not present now  (Exception: During exercise.)   Negative: Skipped or extra beat(s) and increases with exercise or exertion   Negative: Skipped or extra beat(s) and occurs 4 or more times per minute   Negative: History of heart disease (i.e., heart attack, bypass surgery, angina, angioplasty)  (Exception: Brief heartbeat symptoms that went away and now feels well.)   Negative: Age > 60 years  (Exception: Brief heartbeat symptoms that went away and now feels well.)   Negative: Taking water pill (i.e., diuretic) or heart medication (e.g., digoxin)    Protocols used: Heart Rate and Heartbeat [id number]

## 2022-11-18 ENCOUNTER — OFFICE VISIT (OUTPATIENT)
Dept: INTERNAL MEDICINE | Facility: CLINIC | Age: 62
End: 2022-11-18
Payer: COMMERCIAL

## 2022-11-18 VITALS
TEMPERATURE: 98 F | WEIGHT: 160.13 LBS | BODY MASS INDEX: 25.73 KG/M2 | HEART RATE: 79 BPM | DIASTOLIC BLOOD PRESSURE: 80 MMHG | OXYGEN SATURATION: 97 % | SYSTOLIC BLOOD PRESSURE: 110 MMHG | HEIGHT: 66 IN

## 2022-11-18 DIAGNOSIS — R25.3 MUSCLE TWITCHING: Primary | ICD-10-CM

## 2022-11-18 PROCEDURE — 1159F PR MEDICATION LIST DOCUMENTED IN MEDICAL RECORD: ICD-10-PCS | Mod: CPTII,S$GLB,, | Performed by: INTERNAL MEDICINE

## 2022-11-18 PROCEDURE — 99999 PR PBB SHADOW E&M-EST. PATIENT-LVL III: ICD-10-PCS | Mod: PBBFAC,,, | Performed by: INTERNAL MEDICINE

## 2022-11-18 PROCEDURE — 3044F PR MOST RECENT HEMOGLOBIN A1C LEVEL <7.0%: ICD-10-PCS | Mod: CPTII,S$GLB,, | Performed by: INTERNAL MEDICINE

## 2022-11-18 PROCEDURE — 3008F BODY MASS INDEX DOCD: CPT | Mod: CPTII,S$GLB,, | Performed by: INTERNAL MEDICINE

## 2022-11-18 PROCEDURE — 99214 OFFICE O/P EST MOD 30 MIN: CPT | Mod: S$GLB,,, | Performed by: INTERNAL MEDICINE

## 2022-11-18 PROCEDURE — 1160F RVW MEDS BY RX/DR IN RCRD: CPT | Mod: CPTII,S$GLB,, | Performed by: INTERNAL MEDICINE

## 2022-11-18 PROCEDURE — 3008F PR BODY MASS INDEX (BMI) DOCUMENTED: ICD-10-PCS | Mod: CPTII,S$GLB,, | Performed by: INTERNAL MEDICINE

## 2022-11-18 PROCEDURE — 1159F MED LIST DOCD IN RCRD: CPT | Mod: CPTII,S$GLB,, | Performed by: INTERNAL MEDICINE

## 2022-11-18 PROCEDURE — 3079F DIAST BP 80-89 MM HG: CPT | Mod: CPTII,S$GLB,, | Performed by: INTERNAL MEDICINE

## 2022-11-18 PROCEDURE — 1160F PR REVIEW ALL MEDS BY PRESCRIBER/CLIN PHARMACIST DOCUMENTED: ICD-10-PCS | Mod: CPTII,S$GLB,, | Performed by: INTERNAL MEDICINE

## 2022-11-18 PROCEDURE — 99214 PR OFFICE/OUTPT VISIT, EST, LEVL IV, 30-39 MIN: ICD-10-PCS | Mod: S$GLB,,, | Performed by: INTERNAL MEDICINE

## 2022-11-18 PROCEDURE — 3079F PR MOST RECENT DIASTOLIC BLOOD PRESSURE 80-89 MM HG: ICD-10-PCS | Mod: CPTII,S$GLB,, | Performed by: INTERNAL MEDICINE

## 2022-11-18 PROCEDURE — 99999 PR PBB SHADOW E&M-EST. PATIENT-LVL III: CPT | Mod: PBBFAC,,, | Performed by: INTERNAL MEDICINE

## 2022-11-18 PROCEDURE — 3074F SYST BP LT 130 MM HG: CPT | Mod: CPTII,S$GLB,, | Performed by: INTERNAL MEDICINE

## 2022-11-18 PROCEDURE — 3074F PR MOST RECENT SYSTOLIC BLOOD PRESSURE < 130 MM HG: ICD-10-PCS | Mod: CPTII,S$GLB,, | Performed by: INTERNAL MEDICINE

## 2022-11-18 PROCEDURE — 3044F HG A1C LEVEL LT 7.0%: CPT | Mod: CPTII,S$GLB,, | Performed by: INTERNAL MEDICINE

## 2022-11-18 RX ORDER — COVID-19 MOLECULAR TEST ASSAY
KIT MISCELLANEOUS
COMMUNITY
Start: 2022-09-15 | End: 2023-09-12

## 2022-11-18 NOTE — PROGRESS NOTES
Subjective:       Patient ID: Jacek Duran is a 62 y.o. female.    Chief Complaint: Palpitations in neck and Medication Refill    HPI  Pt here for evaluation of 2-3 days of waxing/waning right sided anterior neck twitches. Could happen anytime of the day but seems to be worse after drinking regular coffee in the am. Per pt, her coffee is very strong. She did not drink any coffee today and has not had any symptoms. No personal/FHx of movement disorders.   Review of Systems   Constitutional:  Negative for activity change, appetite change, chills, diaphoresis, fatigue, fever and unexpected weight change.   HENT:  Negative for postnasal drip, rhinorrhea, sinus pressure/congestion, sneezing, sore throat, trouble swallowing and voice change.    Respiratory:  Negative for cough, shortness of breath and wheezing.    Cardiovascular:  Negative for chest pain, palpitations and leg swelling.   Gastrointestinal:  Negative for abdominal pain, blood in stool, constipation, diarrhea, nausea and vomiting.   Genitourinary:  Negative for dysuria.   Musculoskeletal:  Negative for arthralgias and myalgias.   Integumentary:  Negative for rash and wound.   Allergic/Immunologic: Negative for environmental allergies and food allergies.   Hematological:  Negative for adenopathy. Does not bruise/bleed easily.       Objective:      Physical Exam  Constitutional:       General: She is not in acute distress.     Appearance: Normal appearance. She is well-developed. She is not diaphoretic.   HENT:      Head: Normocephalic and atraumatic.      Right Ear: External ear normal.      Left Ear: External ear normal.      Nose: Nose normal.      Mouth/Throat:      Pharynx: No oropharyngeal exudate.   Eyes:      General: No scleral icterus.        Right eye: No discharge.         Left eye: No discharge.      Conjunctiva/sclera: Conjunctivae normal.      Pupils: Pupils are equal, round, and reactive to light.   Neck:      Vascular: No JVD.    Cardiovascular:      Rate and Rhythm: Normal rate and regular rhythm.      Pulses: Normal pulses.      Heart sounds: Normal heart sounds.   Pulmonary:      Effort: Pulmonary effort is normal. No respiratory distress.      Breath sounds: Normal breath sounds. No wheezing, rhonchi or rales.   Abdominal:      General: Bowel sounds are normal. There is no distension.      Palpations: Abdomen is soft.      Tenderness: There is no abdominal tenderness. There is no guarding or rebound.   Musculoskeletal:      Cervical back: Neck supple.      Right lower leg: No edema.      Left lower leg: No edema.   Lymphadenopathy:      Cervical: No cervical adenopathy.   Skin:     General: Skin is warm and dry.      Coloration: Skin is not pale.      Findings: No rash.   Neurological:      General: No focal deficit present.      Mental Status: She is alert and oriented to person, place, and time.      Gait: Gait normal.   Psychiatric:         Behavior: Behavior normal.         Thought Content: Thought content normal.       Assessment:       Problem List Items Addressed This Visit    None  Visit Diagnoses       Muscle twitching    -  Primary    Relevant Orders    CBC Auto Differential    Comprehensive Metabolic Panel    TSH    Magnesium            Plan:    Suspect 2/2 caffeine    Check labs today

## 2023-02-28 ENCOUNTER — PATIENT MESSAGE (OUTPATIENT)
Dept: INTERNAL MEDICINE | Facility: CLINIC | Age: 63
End: 2023-02-28
Payer: COMMERCIAL

## 2023-02-28 ENCOUNTER — TELEPHONE (OUTPATIENT)
Dept: INTERNAL MEDICINE | Facility: CLINIC | Age: 63
End: 2023-02-28
Payer: COMMERCIAL

## 2023-02-28 DIAGNOSIS — Z00.00 ANNUAL PHYSICAL EXAM: Primary | ICD-10-CM

## 2023-02-28 DIAGNOSIS — R00.2 PALPITATIONS: Primary | ICD-10-CM

## 2023-02-28 NOTE — TELEPHONE ENCOUNTER
----- Message from Helen Morel sent at 2/28/2023  8:05 AM CST -----  Contact: Self/[phone]  Patient would like to get a referral.  Referral to what specialty:  Cardiology   Does the patient want the referral with a specific physician:  No   Is the specialist an [facility]:  [facility] ([facility])  Reason (be specific):  Heart Arrhythmia at night   Does the patient already have the specialty clinic appointment scheduled:  No  If yes, what date is the appointment scheduled:     Is the insurance listed in Epic correct? (this is important for a referral):  yes  Advised patient that once provider approves this either a nurse or  will return their call?: yes   Would the patient like a call back, or a response through their [facility] portal?:   call back   Comments:

## 2023-02-28 NOTE — TELEPHONE ENCOUNTER
Wants external referral to cardiology through [facility] for heart arrhythmias at night.

## 2023-03-01 ENCOUNTER — PATIENT MESSAGE (OUTPATIENT)
Dept: INTERNAL MEDICINE | Facility: CLINIC | Age: 63
End: 2023-03-01
Payer: COMMERCIAL

## 2023-03-08 ENCOUNTER — OFFICE VISIT (OUTPATIENT)
Dept: INTERNAL MEDICINE | Facility: CLINIC | Age: 63
End: 2023-03-08
Payer: COMMERCIAL

## 2023-03-08 VITALS
SYSTOLIC BLOOD PRESSURE: 110 MMHG | WEIGHT: 162.06 LBS | TEMPERATURE: 98 F | HEART RATE: 78 BPM | BODY MASS INDEX: 26.05 KG/M2 | OXYGEN SATURATION: 98 % | HEIGHT: 66 IN | DIASTOLIC BLOOD PRESSURE: 80 MMHG

## 2023-03-08 DIAGNOSIS — E78.5 HYPERLIPIDEMIA, UNSPECIFIED HYPERLIPIDEMIA TYPE: Chronic | ICD-10-CM

## 2023-03-08 DIAGNOSIS — K76.0 NAFLD (NONALCOHOLIC FATTY LIVER DISEASE): Chronic | ICD-10-CM

## 2023-03-08 DIAGNOSIS — Z00.00 ANNUAL PHYSICAL EXAM: Primary | ICD-10-CM

## 2023-03-08 DIAGNOSIS — R00.2 PALPITATIONS: ICD-10-CM

## 2023-03-08 PROCEDURE — 1160F PR REVIEW ALL MEDS BY PRESCRIBER/CLIN PHARMACIST DOCUMENTED: ICD-10-PCS | Mod: CPTII,S$GLB,, | Performed by: INTERNAL MEDICINE

## 2023-03-08 PROCEDURE — 99999 PR PBB SHADOW E&M-EST. PATIENT-LVL III: ICD-10-PCS | Mod: PBBFAC,,, | Performed by: INTERNAL MEDICINE

## 2023-03-08 PROCEDURE — 3079F DIAST BP 80-89 MM HG: CPT | Mod: CPTII,S$GLB,, | Performed by: INTERNAL MEDICINE

## 2023-03-08 PROCEDURE — 1159F MED LIST DOCD IN RCRD: CPT | Mod: CPTII,S$GLB,, | Performed by: INTERNAL MEDICINE

## 2023-03-08 PROCEDURE — 1160F RVW MEDS BY RX/DR IN RCRD: CPT | Mod: CPTII,S$GLB,, | Performed by: INTERNAL MEDICINE

## 2023-03-08 PROCEDURE — 99999 PR PBB SHADOW E&M-EST. PATIENT-LVL III: CPT | Mod: PBBFAC,,, | Performed by: INTERNAL MEDICINE

## 2023-03-08 PROCEDURE — 3074F PR MOST RECENT SYSTOLIC BLOOD PRESSURE < 130 MM HG: ICD-10-PCS | Mod: CPTII,S$GLB,, | Performed by: INTERNAL MEDICINE

## 2023-03-08 PROCEDURE — 3008F BODY MASS INDEX DOCD: CPT | Mod: CPTII,S$GLB,, | Performed by: INTERNAL MEDICINE

## 2023-03-08 PROCEDURE — 3008F PR BODY MASS INDEX (BMI) DOCUMENTED: ICD-10-PCS | Mod: CPTII,S$GLB,, | Performed by: INTERNAL MEDICINE

## 2023-03-08 PROCEDURE — 1159F PR MEDICATION LIST DOCUMENTED IN MEDICAL RECORD: ICD-10-PCS | Mod: CPTII,S$GLB,, | Performed by: INTERNAL MEDICINE

## 2023-03-08 PROCEDURE — 3074F SYST BP LT 130 MM HG: CPT | Mod: CPTII,S$GLB,, | Performed by: INTERNAL MEDICINE

## 2023-03-08 PROCEDURE — 99396 PREV VISIT EST AGE 40-64: CPT | Mod: S$GLB,,, | Performed by: INTERNAL MEDICINE

## 2023-03-08 PROCEDURE — 3079F PR MOST RECENT DIASTOLIC BLOOD PRESSURE 80-89 MM HG: ICD-10-PCS | Mod: CPTII,S$GLB,, | Performed by: INTERNAL MEDICINE

## 2023-03-08 PROCEDURE — 99396 PR PREVENTIVE VISIT,EST,40-64: ICD-10-PCS | Mod: S$GLB,,, | Performed by: INTERNAL MEDICINE

## 2023-03-08 RX ORDER — ALPRAZOLAM 0.25 MG/1
0.25 TABLET ORAL 2 TIMES DAILY PRN
Qty: 20 TABLET | Refills: 0 | Status: SHIPPED | OUTPATIENT
Start: 2023-03-08 | End: 2023-09-12 | Stop reason: SDUPTHER

## 2023-03-08 NOTE — PROGRESS NOTES
Subjective:       Patient ID: Jacek Duran is a 62 y.o. female.    Chief Complaint: Annual Exam and Medication Refill    HPI    62 y.o. female here for annual exam.     Cholesterol: total 260  Vaccines: Influenza - ; Tetanus - 2018; Zoster - 2 done; COVID - 3 done  Sexual Screening: not active  STD screening: not active  Eye exam: done last   Mammogram: will do tomorrow  Gyn exam: UTD  Colonoscopy: , due   A1c: 5.4    Exercise: starting to get back into it.  She had COVID last year.  Diet: drug reps bring lunch daily.  Home cooked, out to eat sometimes.    She uses the xanax 0.25 mg 20 tablets over two months.  She usually sleeps pretty well and will wake up on occasion with her heart beating weirdly.    She is concerned about having sleep apnea.  She has palpitations that wake her up when she is sleeping.  She can feel an irregular heart beat.  She wakes up feeling rested.  The palpitations do not happen all the time.    Past Medical History:   Diagnosis Date    Abnormal Pap smear     before , none since    Allergy     History of dysplastic nevus, moderate atypia 2020: Mid upper abdomen    Hyperlipidemia     Mild vitamin D deficiency     NAFLD (nonalcoholic fatty liver disease)      Past Surgical History:   Procedure Laterality Date    AUGMENTATION OF BREAST      BLEPHAROPLASTY Bilateral 3/14/2019    Procedure: BLEPHAROPLASTY;  Surgeon: Beltran Magaña III, MD;  Location: [address];  Service: Plastics;  Laterality: Bilateral;  upper    breast augmentation       SECTION, LOW TRANSVERSE      CHOLECYSTECTOMY      ENDOSCOPIC ULTRASOUND OF UPPER GASTROINTESTINAL TRACT N/A 2019    Procedure: ULTRASOUND, UPPER GI TRACT, ENDOSCOPIC;  Surgeon: Antony Taveras MD;  Location: [address]);  Service: Endoscopy;  Laterality: N/A;  PM prep    LIVER BIOPSY       Social History     Socioeconomic History    Marital status: Single    Number of children: 2   Occupational  History    Occupation: [employer]     Employer: [employer]   Tobacco Use    Smoking status: Never     Passive exposure: Never    Smokeless tobacco: Never   Substance and Sexual Activity    Alcohol use: Yes     Comment: twice a month    Drug use: No    Sexual activity: Not Currently     Partners: Male     Birth control/protection: Post-menopausal   Other Topics Concern    Are you pregnant or think you may be? No   Social History Narrative    She is exercising on and off.     Social Determinants of Health     Financial Resource Strain: Low Risk     Difficulty of Paying Living Expenses: Not hard at all   Food Insecurity: No Food Insecurity    Worried About Running Out of Food in the Last Year: Never true    Ran Out of Food in the Last Year: Never true   Transportation Needs: No Transportation Needs    Lack of Transportation (Medical): No    Lack of Transportation (Non-Medical): No   Physical Activity: Insufficiently Active    Days of Exercise per Week: 3 days    Minutes of Exercise per Session: 30 min   Stress: No Stress Concern Present    Feeling of Stress : Only a little   Social Connections: Unknown    Frequency of Communication with Friends and Family: More than three times a week    Frequency of Social Gatherings with Friends and Family: Twice a week    Active Member of Clubs or Organizations: No    Attends Club or Organization Meetings: More than 4 times per year    Marital Status:    Housing Stability: Low Risk     Unable to Pay for Housing in the Last Year: No    Number of Places Lived in the Last Year: 1    Unstable Housing in the Last Year: No     Review of patient's allergies indicates:  No Known Allergies  Elizabeth NAHOMI Lockney had no medications administered during this visit.    Review of Systems      Objective:      Physical Exam  Vitals reviewed.   Constitutional:       Appearance: She is well-developed.   HENT:      Head: Normocephalic and atraumatic.      Mouth/Throat:      Pharynx: No  oropharyngeal exudate.   Eyes:      General: No scleral icterus.        Right eye: No discharge.         Left eye: No discharge.      Pupils: Pupils are equal, round, and reactive to light.   Neck:      Thyroid: No thyromegaly.      Trachea: No tracheal deviation.   Cardiovascular:      Rate and Rhythm: Normal rate and regular rhythm.      Heart sounds: Normal heart sounds. No murmur heard.    No friction rub. No gallop.   Pulmonary:      Effort: Pulmonary effort is normal. No respiratory distress.      Breath sounds: Normal breath sounds. No wheezing or rales.   Chest:      Chest wall: No tenderness.   Abdominal:      General: Bowel sounds are normal. There is no distension.      Palpations: Abdomen is soft. There is no mass.      Tenderness: There is no abdominal tenderness. There is no guarding or rebound.   Musculoskeletal:         General: No tenderness. Normal range of motion.      Cervical back: Normal range of motion and neck supple.   Skin:     General: Skin is warm and dry.      Coloration: Skin is not pale.      Findings: No erythema or rash.   Neurological:      Mental Status: She is alert and oriented to person, place, and time.   Psychiatric:         Behavior: Behavior normal.       Assessment:       1. Annual physical exam    2. Hyperlipidemia, unspecified hyperlipidemia type    3. NAFLD (nonalcoholic fatty liver disease)  - Comprehensive Metabolic Panel; Future  - Comprehensive Metabolic Panel    4. Palpitations  - Holter monitor - 24 hour; Future  - Home Sleep Study; Future  - Holter monitor - 24 hour  - Home Sleep Study      Plan:       1. Reviewed lab work with patient.  Up-to-date on vaccines.  Discussed diet and exercise.  2.  Monitor.  3.  Recheck CMP in a week.  4. Check 24 hour Holter monitor, home sleep study.

## 2023-03-10 ENCOUNTER — PATIENT MESSAGE (OUTPATIENT)
Dept: INTERNAL MEDICINE | Facility: CLINIC | Age: 63
End: 2023-03-10
Payer: COMMERCIAL

## 2023-03-14 ENCOUNTER — TELEPHONE (OUTPATIENT)
Dept: INTERNAL MEDICINE | Facility: CLINIC | Age: 63
End: 2023-03-14
Payer: COMMERCIAL

## 2023-03-14 NOTE — TELEPHONE ENCOUNTER
----- Message from Kristina Roblero sent at 3/13/2023 11:27 AM CDT -----  Contact: [facility] ary Figueroa [phone] option 2  Henry needs a call back about order that was sent for a sleep study and they need to talk to the office.

## 2023-03-16 LAB — BCS RECOMMENDATION EXT: NORMAL

## 2023-03-27 ENCOUNTER — PATIENT MESSAGE (OUTPATIENT)
Dept: INTERNAL MEDICINE | Facility: CLINIC | Age: 63
End: 2023-03-27
Payer: COMMERCIAL

## 2023-03-27 DIAGNOSIS — G47.30 SLEEP APNEA, UNSPECIFIED TYPE: Primary | ICD-10-CM

## 2023-03-27 NOTE — TELEPHONE ENCOUNTER
Need dx changed for sleep study to unspecified sleep apnea so [facility] can schedule it.

## 2023-03-29 ENCOUNTER — PATIENT MESSAGE (OUTPATIENT)
Dept: INTERNAL MEDICINE | Facility: CLINIC | Age: 63
End: 2023-03-29
Payer: COMMERCIAL

## 2023-04-06 ENCOUNTER — PATIENT MESSAGE (OUTPATIENT)
Dept: INTERNAL MEDICINE | Facility: CLINIC | Age: 63
End: 2023-04-06
Payer: COMMERCIAL

## 2023-04-06 ENCOUNTER — TELEPHONE (OUTPATIENT)
Dept: INTERNAL MEDICINE | Facility: CLINIC | Age: 63
End: 2023-04-06
Payer: COMMERCIAL

## 2023-04-06 NOTE — TELEPHONE ENCOUNTER
Please let patient know that she does not have sleep apnea according to the sleep study.

## 2023-04-07 ENCOUNTER — PATIENT MESSAGE (OUTPATIENT)
Dept: INTERNAL MEDICINE | Facility: CLINIC | Age: 63
End: 2023-04-07
Payer: COMMERCIAL

## 2023-04-10 ENCOUNTER — PATIENT MESSAGE (OUTPATIENT)
Dept: INTERNAL MEDICINE | Facility: CLINIC | Age: 63
End: 2023-04-10
Payer: COMMERCIAL

## 2023-04-10 NOTE — TELEPHONE ENCOUNTER
Received Holter monitor results.  Heart rate  with an average rate of 75 beats per minute.  There were rare isolated PVCs and PACs.  Patient felt fast pounding documented sinus rhythm of weights of 65-71 without ectopy.

## 2023-04-26 ENCOUNTER — PATIENT OUTREACH (OUTPATIENT)
Dept: ADMINISTRATIVE | Facility: HOSPITAL | Age: 63
End: 2023-04-26
Payer: COMMERCIAL

## 2023-08-12 ENCOUNTER — PATIENT MESSAGE (OUTPATIENT)
Dept: INTERNAL MEDICINE | Facility: CLINIC | Age: 63
End: 2023-08-12
Payer: COMMERCIAL

## 2023-08-12 DIAGNOSIS — H54.7 VISION LOSS: Primary | ICD-10-CM

## 2023-08-14 ENCOUNTER — TELEPHONE (OUTPATIENT)
Dept: INTERNAL MEDICINE | Facility: CLINIC | Age: 63
End: 2023-08-14
Payer: COMMERCIAL

## 2023-08-14 DIAGNOSIS — H54.7 VISION LOSS: Primary | ICD-10-CM

## 2023-08-14 NOTE — TELEPHONE ENCOUNTER
Please order external CV US order, due to pt insurance this needs to be done outside of [facility]. Please fax the order to pt at [phone] she will deliver it to [facility] [address].

## 2023-08-14 NOTE — TELEPHONE ENCOUNTER
----- Message from Louise Magallanes sent at 8/14/2023  8:29 AM CDT -----  Regarding: RE: David  Patient needs to go to an [facility] facility due to insurance. Can not have test done at [facility].   Please call patient to discuss external order.   ----- Message -----  From: Taylor Hanson MA  Sent: 8/14/2023   8:20 AM CDT  To: [facility] Referral Coordinators  Subject: David                                              Please call pt to schedule her ultrasound  Thank You,  ANTONINA Vazquez

## 2023-08-14 NOTE — TELEPHONE ENCOUNTER
Message sent to referral coordinator to schedule ultrasound      Message sent to pt via portal letting her know that our referral team will call her to schedule

## 2023-09-05 ENCOUNTER — PATIENT MESSAGE (OUTPATIENT)
Dept: INTERNAL MEDICINE | Facility: CLINIC | Age: 63
End: 2023-09-05
Payer: COMMERCIAL

## 2023-09-06 DIAGNOSIS — Z00.00 ANNUAL PHYSICAL EXAM: Primary | ICD-10-CM

## 2023-09-12 ENCOUNTER — OFFICE VISIT (OUTPATIENT)
Dept: INTERNAL MEDICINE | Facility: CLINIC | Age: 63
End: 2023-09-12
Payer: COMMERCIAL

## 2023-09-12 VITALS
TEMPERATURE: 98 F | HEIGHT: 66 IN | OXYGEN SATURATION: 95 % | DIASTOLIC BLOOD PRESSURE: 84 MMHG | WEIGHT: 147.69 LBS | RESPIRATION RATE: 18 BRPM | HEART RATE: 79 BPM | BODY MASS INDEX: 23.74 KG/M2 | SYSTOLIC BLOOD PRESSURE: 122 MMHG

## 2023-09-12 DIAGNOSIS — R79.89 ELEVATED LFTS: ICD-10-CM

## 2023-09-12 DIAGNOSIS — E78.5 HYPERLIPIDEMIA, UNSPECIFIED HYPERLIPIDEMIA TYPE: Primary | Chronic | ICD-10-CM

## 2023-09-12 DIAGNOSIS — J02.9 SORE THROAT: ICD-10-CM

## 2023-09-12 DIAGNOSIS — F41.9 ANXIETY: Chronic | ICD-10-CM

## 2023-09-12 PROCEDURE — 99999 PR PBB SHADOW E&M-EST. PATIENT-LVL IV: CPT | Mod: PBBFAC,,, | Performed by: INTERNAL MEDICINE

## 2023-09-12 PROCEDURE — 99999 PR PBB SHADOW E&M-EST. PATIENT-LVL IV: ICD-10-PCS | Mod: PBBFAC,,, | Performed by: INTERNAL MEDICINE

## 2023-09-12 PROCEDURE — 3008F BODY MASS INDEX DOCD: CPT | Mod: CPTII,S$GLB,, | Performed by: INTERNAL MEDICINE

## 2023-09-12 PROCEDURE — 99214 PR OFFICE/OUTPT VISIT, EST, LEVL IV, 30-39 MIN: ICD-10-PCS | Mod: S$GLB,,, | Performed by: INTERNAL MEDICINE

## 2023-09-12 PROCEDURE — 3079F DIAST BP 80-89 MM HG: CPT | Mod: CPTII,S$GLB,, | Performed by: INTERNAL MEDICINE

## 2023-09-12 PROCEDURE — 3074F PR MOST RECENT SYSTOLIC BLOOD PRESSURE < 130 MM HG: ICD-10-PCS | Mod: CPTII,S$GLB,, | Performed by: INTERNAL MEDICINE

## 2023-09-12 PROCEDURE — 1160F RVW MEDS BY RX/DR IN RCRD: CPT | Mod: CPTII,S$GLB,, | Performed by: INTERNAL MEDICINE

## 2023-09-12 PROCEDURE — 3074F SYST BP LT 130 MM HG: CPT | Mod: CPTII,S$GLB,, | Performed by: INTERNAL MEDICINE

## 2023-09-12 PROCEDURE — 3008F PR BODY MASS INDEX (BMI) DOCUMENTED: ICD-10-PCS | Mod: CPTII,S$GLB,, | Performed by: INTERNAL MEDICINE

## 2023-09-12 PROCEDURE — 3079F PR MOST RECENT DIASTOLIC BLOOD PRESSURE 80-89 MM HG: ICD-10-PCS | Mod: CPTII,S$GLB,, | Performed by: INTERNAL MEDICINE

## 2023-09-12 PROCEDURE — 1160F PR REVIEW ALL MEDS BY PRESCRIBER/CLIN PHARMACIST DOCUMENTED: ICD-10-PCS | Mod: CPTII,S$GLB,, | Performed by: INTERNAL MEDICINE

## 2023-09-12 PROCEDURE — 1159F MED LIST DOCD IN RCRD: CPT | Mod: CPTII,S$GLB,, | Performed by: INTERNAL MEDICINE

## 2023-09-12 PROCEDURE — 99214 OFFICE O/P EST MOD 30 MIN: CPT | Mod: S$GLB,,, | Performed by: INTERNAL MEDICINE

## 2023-09-12 PROCEDURE — 1159F PR MEDICATION LIST DOCUMENTED IN MEDICAL RECORD: ICD-10-PCS | Mod: CPTII,S$GLB,, | Performed by: INTERNAL MEDICINE

## 2023-09-12 RX ORDER — ALPRAZOLAM 0.25 MG/1
0.25 TABLET ORAL 2 TIMES DAILY PRN
Qty: 20 TABLET | Refills: 0 | Status: SHIPPED | OUTPATIENT
Start: 2023-09-12 | End: 2024-03-09 | Stop reason: SDUPTHER

## 2023-09-12 RX ORDER — OMEGA-3-ACID ETHYL ESTERS 1 G/1
2 CAPSULE, LIQUID FILLED ORAL 2 TIMES DAILY
COMMUNITY
End: 2024-03-10

## 2023-09-12 RX ORDER — MULTIVIT WITH MINERALS/HERBS
1 TABLET ORAL DAILY
COMMUNITY
End: 2024-03-10

## 2023-09-12 RX ORDER — MULTIVITAMIN
1 TABLET ORAL DAILY
COMMUNITY

## 2023-09-12 NOTE — PROGRESS NOTES
Subjective:       Patient ID: Jacek Duran is a 63 y.o. female.    Chief Complaint: Follow-up    HPI    63-year-old female here for 6 month follow-up.  She had a virus last week.  She reports that she had a sore throat.  She thought she had COVID and got swabbed.  She got COVID, flu, and RSV swabs.  She had a bad sore throat and never had a fever.  She went to  the day after.  Home covid test was still negative.  She is concerned about her elevated LFTs.    She has anxiety and has used xanax 0.25 mg twice daily and got #20 in March.  She is just running out of this medication.    HLD - Patient is currently on no medication.  Her last lipid panel was   Cholesterol   Date Value Ref Range Status   05/12/2022 235 (H) 120 - 199 mg/dL Final     Comment:     The National Cholesterol Education Program (NCEP) has set the  following guidelines (reference ranges) for Cholesterol:  Optimal.....................<200 mg/dL  Borderline High.............200-239 mg/dL  High........................> or = 240 mg/dL       Triglycerides   Date Value Ref Range Status   05/12/2022 66 30 - 150 mg/dL Final     Comment:     The National Cholesterol Education Program (NCEP) has set the  following guidelines (reference values) for triglycerides:  Normal......................<150 mg/dL  Borderline High.............150-199 mg/dL  High........................200-499 mg/dL       HDL   Date Value Ref Range Status   05/12/2022 85 (H) 40 - 75 mg/dL Final     Comment:     The National Cholesterol Education Program (NCEP) has set the  following guidelines (reference values) for HDL Cholesterol:  Low...............<40 mg/dL  Optimal...........>60 mg/dL       LDL Cholesterol   Date Value Ref Range Status   05/12/2022 136.8 63.0 - 159.0 mg/dL Final     Comment:     The National Cholesterol Education Program (NCEP) has set the  following guidelines (reference values) for LDL Cholesterol:  Optimal.......................<130 mg/dL  Borderline  High...............130-159 mg/dL  High..........................160-189 mg/dL  Very High.....................>190 mg/dL     .  Side effects of the medication: none.    Review of Systems      Objective:      Physical Exam  Vitals reviewed.   Constitutional:       Appearance: She is well-developed.   HENT:      Head: Normocephalic and atraumatic.      Mouth/Throat:      Pharynx: No oropharyngeal exudate.   Eyes:      General: No scleral icterus.        Right eye: No discharge.         Left eye: No discharge.      Pupils: Pupils are equal, round, and reactive to light.   Neck:      Thyroid: No thyromegaly.      Trachea: No tracheal deviation.   Cardiovascular:      Rate and Rhythm: Normal rate and regular rhythm.      Heart sounds: Normal heart sounds. No murmur heard.     No friction rub. No gallop.   Pulmonary:      Effort: Pulmonary effort is normal. No respiratory distress.      Breath sounds: Normal breath sounds. No wheezing or rales.   Chest:      Chest wall: No tenderness.   Abdominal:      General: Bowel sounds are normal. There is no distension.      Palpations: Abdomen is soft. There is no mass.      Tenderness: There is no abdominal tenderness. There is no guarding or rebound.   Musculoskeletal:         General: No tenderness. Normal range of motion.      Cervical back: Normal range of motion and neck supple.   Skin:     General: Skin is warm and dry.      Coloration: Skin is not pale.      Findings: No erythema or rash.   Neurological:      Mental Status: She is alert and oriented to person, place, and time.   Psychiatric:         Behavior: Behavior normal.         Assessment:       1. Hyperlipidemia, unspecified hyperlipidemia type    2. Elevated LFTs  - Comprehensive Metabolic Panel; Future    3. Sore throat  - FAZAL-BARR VIRUS ANTIBODY PANEL; Future    4. Anxiety      Plan:       1. Monitor.  On no current medication.  2.  Check CMP.  3.  Check Fazal-Barr virus panel.  4.  Continue Xanax 0.25 mg  twice daily as needed.

## 2023-09-21 ENCOUNTER — PATIENT MESSAGE (OUTPATIENT)
Dept: INTERNAL MEDICINE | Facility: CLINIC | Age: 63
End: 2023-09-21
Payer: COMMERCIAL

## 2023-09-21 DIAGNOSIS — R79.89 ELEVATED LFTS: Primary | ICD-10-CM

## 2023-09-27 NOTE — TELEPHONE ENCOUNTER
Received records from [facility].    Sodium 139, potassium 4.3, chloride 105, bicarb 22, glucose 84, BUN 17, creatinine 0.8, calcium 9.2, total protein 7.3, albumin 4, AST 48, ALT 98, alk-phos 161, total bili 0.3, GFR 83    Candi bar antibody IgG 44.6

## 2024-03-09 NOTE — TELEPHONE ENCOUNTER
No care due was identified.  [facility] Embedded Care Due Messages. Reference number: [id number].   3/09/2024 9:10:17 AM CST

## 2024-03-10 RX ORDER — ALPRAZOLAM 0.25 MG/1
0.25 TABLET ORAL 2 TIMES DAILY PRN
Qty: 20 TABLET | Refills: 0 | Status: SHIPPED | OUTPATIENT
Start: 2024-03-10 | End: 2024-04-09

## 2024-03-12 ENCOUNTER — OFFICE VISIT (OUTPATIENT)
Dept: INTERNAL MEDICINE | Facility: CLINIC | Age: 64
End: 2024-03-12
Payer: COMMERCIAL

## 2024-03-12 VITALS
SYSTOLIC BLOOD PRESSURE: 116 MMHG | RESPIRATION RATE: 18 BRPM | OXYGEN SATURATION: 97 % | HEART RATE: 75 BPM | DIASTOLIC BLOOD PRESSURE: 68 MMHG | TEMPERATURE: 98 F | HEIGHT: 66 IN | WEIGHT: 160.94 LBS | BODY MASS INDEX: 25.86 KG/M2

## 2024-03-12 DIAGNOSIS — Z00.00 ANNUAL PHYSICAL EXAM: Primary | ICD-10-CM

## 2024-03-12 DIAGNOSIS — F41.9 ANXIETY: Chronic | ICD-10-CM

## 2024-03-12 DIAGNOSIS — E78.5 HYPERLIPIDEMIA, UNSPECIFIED HYPERLIPIDEMIA TYPE: Chronic | ICD-10-CM

## 2024-03-12 DIAGNOSIS — K76.0 NAFLD (NONALCOHOLIC FATTY LIVER DISEASE): Chronic | ICD-10-CM

## 2024-03-12 DIAGNOSIS — Z12.31 VISIT FOR SCREENING MAMMOGRAM: ICD-10-CM

## 2024-03-12 PROCEDURE — 99396 PREV VISIT EST AGE 40-64: CPT | Mod: S$GLB,,, | Performed by: INTERNAL MEDICINE

## 2024-03-12 PROCEDURE — 3074F SYST BP LT 130 MM HG: CPT | Mod: CPTII,S$GLB,, | Performed by: INTERNAL MEDICINE

## 2024-03-12 PROCEDURE — 3008F BODY MASS INDEX DOCD: CPT | Mod: CPTII,S$GLB,, | Performed by: INTERNAL MEDICINE

## 2024-03-12 PROCEDURE — 3078F DIAST BP <80 MM HG: CPT | Mod: CPTII,S$GLB,, | Performed by: INTERNAL MEDICINE

## 2024-03-12 PROCEDURE — 1160F RVW MEDS BY RX/DR IN RCRD: CPT | Mod: CPTII,S$GLB,, | Performed by: INTERNAL MEDICINE

## 2024-03-12 PROCEDURE — 99999 PR PBB SHADOW E&M-EST. PATIENT-LVL IV: CPT | Mod: PBBFAC,,, | Performed by: INTERNAL MEDICINE

## 2024-03-12 PROCEDURE — 1159F MED LIST DOCD IN RCRD: CPT | Mod: CPTII,S$GLB,, | Performed by: INTERNAL MEDICINE

## 2024-03-12 NOTE — PROGRESS NOTES
Subjective:       Patient ID: Jacek Duran is a 63 y.o. female.    Chief Complaint: Annual Exam    HPI    63 y.o. female here for annual exam.     Cholesterol: needs  Vaccines: Influenza - done; Tetanus - 2018; Zoster - 2 done; COVID - 3 done  Sexual Screening: not active  STD screening: not active  Eye exam: done last   Mammogram: UTD  Gyn exam: UTD  Colonoscopy: , due   A1c: needs    Exercise:not like she should.  She was not walking as much when it was cold and rainy.  She walks her neighborhood.  Diet: eating out.  Fed by the drug reps daily.    Past Medical History:   Diagnosis Date    Abnormal Pap smear     before , none since    Allergy     History of dysplastic nevus, moderate atypia 2020: Mid upper abdomen    Hyperlipidemia     Mild vitamin D deficiency     NAFLD (nonalcoholic fatty liver disease)      Past Surgical History:   Procedure Laterality Date    AUGMENTATION OF BREAST      BLEPHAROPLASTY Bilateral 2019    Procedure: BLEPHAROPLASTY;  Surgeon: Beltran Magaña III, MD;  Location: [address];  Service: Plastics;  Laterality: Bilateral;  upper    breast augmentation       SECTION, LOW TRANSVERSE      CHOLECYSTECTOMY      COSMETIC SURGERY      ENDOSCOPIC ULTRASOUND OF UPPER GASTROINTESTINAL TRACT N/A 2019    Procedure: ULTRASOUND, UPPER GI TRACT, ENDOSCOPIC;  Surgeon: Antony Taveras MD;  Location: [address]);  Service: Endoscopy;  Laterality: N/A;  PM prep    LIVER BIOPSY       Social History     Socioeconomic History    Marital status: Single    Number of children: 2   Occupational History    Occupation: [employer]     Employer: [employer]   Tobacco Use    Smoking status: Never     Passive exposure: Never    Smokeless tobacco: Never   Substance and Sexual Activity    Alcohol use: Yes     Comment: twice a month    Drug use: No    Sexual activity: Not Currently     Partners: Male     Birth control/protection:  Post-menopausal   Other Topics Concern    Are you pregnant or think you may be? No   Social History Narrative    She is exercising on and off.     Social Determinants of Health     Financial Resource Strain: Low Risk  (3/9/2024)    Overall Financial Resource Strain (CARDIA)     Difficulty of Paying Living Expenses: Not very hard   Food Insecurity: No Food Insecurity (3/9/2024)    Hunger Vital Sign     Worried About Running Out of Food in the Last Year: Never true     Ran Out of Food in the Last Year: Never true   Transportation Needs: No Transportation Needs (3/9/2024)    PRAPARE - Transportation     Lack of Transportation (Medical): No     Lack of Transportation (Non-Medical): No   Physical Activity: Insufficiently Active (3/9/2024)    Exercise Vital Sign     Days of Exercise per Week: 3 days     Minutes of Exercise per Session: 30 min   Stress: No Stress Concern Present (3/9/2024)    [employer] - Occupational Stress Questionnaire     Feeling of Stress : Not at all   Social Connections: Unknown (3/9/2024)    Social Connection and Isolation Panel [NHANES]     Frequency of Communication with Friends and Family: Patient declined     Frequency of Social Gatherings with Friends and Family: Patient declined     Active Member of Clubs or Organizations: Patient declined     Attends Club or Organization Meetings: Patient declined     Marital Status:    Housing Stability: Low Risk  (3/9/2024)    Housing Stability Vital Sign     Unable to Pay for Housing in the Last Year: No     Number of Places Lived in the Last Year: 1     Unstable Housing in the Last Year: No     Review of patient's allergies indicates:  No Known Allergies  Elizabeth Duran had no medications administered during this visit.    Review of Systems      Objective:      Physical Exam  Vitals reviewed.   Constitutional:       Appearance: She is well-developed.   HENT:      Head: Normocephalic and atraumatic.       Mouth/Throat:      Pharynx: No oropharyngeal exudate.   Eyes:      General: No scleral icterus.        Right eye: No discharge.         Left eye: No discharge.      Pupils: Pupils are equal, round, and reactive to light.   Neck:      Thyroid: No thyromegaly.      Trachea: No tracheal deviation.   Cardiovascular:      Rate and Rhythm: Normal rate and regular rhythm.      Heart sounds: Normal heart sounds. No murmur heard.     No friction rub. No gallop.   Pulmonary:      Effort: Pulmonary effort is normal. No respiratory distress.      Breath sounds: Normal breath sounds. No wheezing or rales.   Chest:      Chest wall: No tenderness.   Abdominal:      General: Bowel sounds are normal. There is no distension.      Palpations: Abdomen is soft. There is no mass.      Tenderness: There is no abdominal tenderness. There is no guarding or rebound.   Musculoskeletal:         General: No tenderness. Normal range of motion.      Cervical back: Normal range of motion and neck supple.   Skin:     General: Skin is warm and dry.      Coloration: Skin is not pale.      Findings: No erythema or rash.   Neurological:      Mental Status: She is alert and oriented to person, place, and time.   Psychiatric:         Behavior: Behavior normal.         Assessment:       1. Annual physical exam  - CBC Auto Differential; Future  - Comprehensive Metabolic Panel; Future  - TSH; Future  - Lipid Panel; Future  - Hemoglobin A1C; Future    2. Hyperlipidemia, unspecified hyperlipidemia type    3. NAFLD (nonalcoholic fatty liver disease)    4. Anxiety    5. Visit for screening mammogram  - Mammo Digital Screening Bilat; Future      Plan:       1. Check CBC, CMP, TSH, lipids, A1c.  Discussed diet and exercise.  Discussed vaccines   2. Monitor.    3. Discussed diet and exercise.    4.  Continue Xanax twice daily as needed  5.  Check mammogram.

## 2024-03-13 ENCOUNTER — PATIENT MESSAGE (OUTPATIENT)
Dept: INTERNAL MEDICINE | Facility: CLINIC | Age: 64
End: 2024-03-13
Payer: COMMERCIAL

## 2024-03-13 LAB
ALBUMIN SERPL-MCNC: 4.3 G/DL (ref 3.6–5.1)
ALBUMIN/GLOB SERPL: 1.5 (CALC) (ref 1–2.5)
ALP SERPL-CCNC: 125 U/L (ref 37–153)
ALT SERPL-CCNC: 51 U/L (ref 6–29)
AST SERPL-CCNC: 29 U/L (ref 10–35)
BASOPHILS # BLD AUTO: 27 CELLS/UL (ref 0–200)
BASOPHILS NFR BLD AUTO: 0.4 %
BILIRUB SERPL-MCNC: 0.5 MG/DL (ref 0.2–1.2)
BUN SERPL-MCNC: 12 MG/DL (ref 7–25)
BUN/CREAT SERPL: ABNORMAL (CALC) (ref 6–22)
CALCIUM SERPL-MCNC: 9.5 MG/DL (ref 8.6–10.4)
CHLORIDE SERPL-SCNC: 105 MMOL/L (ref 98–110)
CHOLEST SERPL-MCNC: 271 MG/DL
CHOLEST/HDLC SERPL: 2.9 (CALC)
CO2 SERPL-SCNC: 28 MMOL/L (ref 20–32)
CREAT SERPL-MCNC: 0.75 MG/DL (ref 0.5–1.05)
EGFR: 90 ML/MIN/1.73M2
EOSINOPHIL # BLD AUTO: 80 CELLS/UL (ref 15–500)
EOSINOPHIL NFR BLD AUTO: 1.2 %
ERYTHROCYTE [DISTWIDTH] IN BLOOD BY AUTOMATED COUNT: 12.5 % (ref 11–15)
GLOBULIN SER CALC-MCNC: 2.9 G/DL (CALC) (ref 1.9–3.7)
GLUCOSE SERPL-MCNC: 117 MG/DL (ref 65–99)
HBA1C MFR BLD: 5.6 % OF TOTAL HGB
HCT VFR BLD AUTO: 36.9 % (ref 35–45)
HDLC SERPL-MCNC: 95 MG/DL
HGB BLD-MCNC: 12.4 G/DL (ref 11.7–15.5)
LDLC SERPL CALC-MCNC: 157 MG/DL (CALC)
LYMPHOCYTES # BLD AUTO: 1327 CELLS/UL (ref 850–3900)
LYMPHOCYTES NFR BLD AUTO: 19.8 %
MCH RBC QN AUTO: 30.2 PG (ref 27–33)
MCHC RBC AUTO-ENTMCNC: 33.6 G/DL (ref 32–36)
MCV RBC AUTO: 89.8 FL (ref 80–100)
MONOCYTES # BLD AUTO: 308 CELLS/UL (ref 200–950)
MONOCYTES NFR BLD AUTO: 4.6 %
NEUTROPHILS # BLD AUTO: 4958 CELLS/UL (ref 1500–7800)
NEUTROPHILS NFR BLD AUTO: 74 %
NONHDLC SERPL-MCNC: 176 MG/DL (CALC)
PLATELET # BLD AUTO: 284 THOUSAND/UL (ref 140–400)
PMV BLD REES-ECKER: 11 FL (ref 7.5–12.5)
POTASSIUM SERPL-SCNC: 4.2 MMOL/L (ref 3.5–5.3)
PROT SERPL-MCNC: 7.2 G/DL (ref 6.1–8.1)
RBC # BLD AUTO: 4.11 MILLION/UL (ref 3.8–5.1)
SODIUM SERPL-SCNC: 142 MMOL/L (ref 135–146)
TRIGL SERPL-MCNC: 84 MG/DL
TSH SERPL-ACNC: 2.08 MIU/L (ref 0.4–4.5)
WBC # BLD AUTO: 6.7 THOUSAND/UL (ref 3.8–10.8)

## 2024-04-24 ENCOUNTER — PATIENT OUTREACH (OUTPATIENT)
Dept: ADMINISTRATIVE | Facility: HOSPITAL | Age: 64
End: 2024-04-24
Payer: COMMERCIAL

## 2024-06-25 RX ORDER — ALPRAZOLAM 0.25 MG/1
0.25 TABLET ORAL 2 TIMES DAILY PRN
Qty: 20 TABLET | Refills: 0 | Status: SHIPPED | OUTPATIENT
Start: 2024-06-25 | End: 2024-07-25

## 2024-06-25 NOTE — TELEPHONE ENCOUNTER
No care due was identified.  [facility] Embedded Care Due Messages. Reference number: [id number].   6/25/2024 3:58:45 PM CDT

## 2024-11-05 DIAGNOSIS — F41.9 ANXIETY: Primary | Chronic | ICD-10-CM

## 2024-11-05 RX ORDER — ALPRAZOLAM 0.25 MG/1
0.25 TABLET ORAL 2 TIMES DAILY PRN
Qty: 20 TABLET | Refills: 0 | Status: SHIPPED | OUTPATIENT
Start: 2024-11-05 | End: 2024-12-05

## 2024-11-05 NOTE — TELEPHONE ENCOUNTER
No care due was identified.  [facility] Embedded Care Due Messages. Reference number: [id number].   11/05/2024 10:06:41 AM CST

## 2024-12-19 ENCOUNTER — PATIENT MESSAGE (OUTPATIENT)
Dept: INTERNAL MEDICINE | Facility: CLINIC | Age: 64
End: 2024-12-19
Payer: COMMERCIAL

## 2024-12-19 ENCOUNTER — TELEPHONE (OUTPATIENT)
Dept: INTERNAL MEDICINE | Facility: CLINIC | Age: 64
End: 2024-12-19
Payer: COMMERCIAL

## 2024-12-19 DIAGNOSIS — N20.0 KIDNEY STONES: Primary | ICD-10-CM

## 2024-12-19 NOTE — TELEPHONE ENCOUNTER
----- Message from Jeremias sent at 12/19/2024 10:08 AM CST -----  Regarding: Urine Lab Order for [facility]  Contact: Pt [phone]  .1MEDICALADVICE     Patient is calling for Medical Advice regarding: Patient called to report she believes she has a kidney stone again. She wanted to know if she could get a urine culture ordered for [facility]. She sent a message through portal but wanted me to send another and make it urgent.    How long has patient had these symptoms:    Pharmacy name and phone#:    Patient [facility] a call back or [facility]: Call    Comments:    Please advise patient replies from provider may take up to 48 hours.

## 2024-12-19 NOTE — TELEPHONE ENCOUNTER
----- Message from Radha sent at 12/19/2024 10:48 AM CST -----  Contact: [phone] lab co  .1MEDICALADVICE     Patient is calling for Medical Advice regarding:Lab co called and asked if Mary Barton MA would fax orders to [phone]    How long has patient had these symptoms:    Pharmacy name and phone#:    Patient wants a call back or [facility]:call    Comments:    Please advise patient replies from provider may take up to 48 hours.

## 2024-12-20 ENCOUNTER — E-VISIT (OUTPATIENT)
Dept: INTERNAL MEDICINE | Facility: CLINIC | Age: 64
End: 2024-12-20
Payer: COMMERCIAL

## 2024-12-20 ENCOUNTER — TELEPHONE (OUTPATIENT)
Dept: INTERNAL MEDICINE | Facility: CLINIC | Age: 64
End: 2024-12-20
Payer: COMMERCIAL

## 2024-12-20 DIAGNOSIS — N20.0 NEPHROLITHIASIS: Primary | ICD-10-CM

## 2024-12-20 DIAGNOSIS — R10.9 ABDOMINAL PAIN, UNSPECIFIED ABDOMINAL LOCATION: ICD-10-CM

## 2024-12-20 LAB
APPEARANCE UR: CLEAR
BACTERIA #/AREA URNS HPF: NORMAL /[HPF]
BILIRUB UR QL STRIP: NEGATIVE
CASTS URNS QL MICRO: NORMAL /LPF
COLOR UR: YELLOW
EPI CELLS #/AREA URNS HPF: NORMAL /HPF (ref 0–10)
GLUCOSE UR QL STRIP: NEGATIVE
HGB UR QL STRIP: ABNORMAL
KETONES UR QL STRIP: NEGATIVE
LEUKOCYTE ESTERASE UR QL STRIP: NEGATIVE
MICRO URNS: ABNORMAL
NITRITE UR QL STRIP: NEGATIVE
PH UR STRIP: 6 [PH] (ref 5–7.5)
PROT UR QL STRIP: NEGATIVE
RBC #/AREA URNS HPF: NORMAL /HPF (ref 0–2)
SP GR UR STRIP: 1.01 (ref 1–1.03)
UROBILINOGEN UR STRIP-MCNC: 0.2 MG/DL (ref 0.2–1)
WBC #/AREA URNS HPF: NORMAL /HPF (ref 0–5)

## 2024-12-20 RX ORDER — TAMSULOSIN HYDROCHLORIDE 0.4 MG/1
CAPSULE ORAL
Qty: 90 CAPSULE | OUTPATIENT
Start: 2024-12-20

## 2024-12-20 RX ORDER — OXYCODONE AND ACETAMINOPHEN 7.5; 325 MG/1; MG/1
.5-1 TABLET ORAL EVERY 8 HOURS PRN
Qty: 21 TABLET | Refills: 0 | Status: SHIPPED | OUTPATIENT
Start: 2024-12-20

## 2024-12-20 RX ORDER — TAMSULOSIN HYDROCHLORIDE 0.4 MG/1
0.4 CAPSULE ORAL DAILY
Qty: 30 CAPSULE | Refills: 0 | Status: SHIPPED | OUTPATIENT
Start: 2024-12-20

## 2024-12-20 NOTE — TELEPHONE ENCOUNTER
No care due was identified.  [facility] Embedded Care Due Messages. Reference number: [id number].   12/20/2024 12:12:50 PM CST

## 2024-12-20 NOTE — TELEPHONE ENCOUNTER
----- Message from Usrzula sent at 12/20/2024  9:34 AM CST -----  Contact: [phone]@[email or web address]  Good morning patient would like a call back to discuss her E visit that the doc wants her to do and she can not open it up. Please call patient to advise [phone]

## 2024-12-20 NOTE — PROGRESS NOTES
Patient ID: Jacek Duran is a 64 y.o. female.    Chief Complaint: General Illness (Entered automatically based on patient selection in [facility].)    The patient initiated a request through [facility] on 12/20/2024 for evaluation and management with a chief complaint of General Illness (Entered automatically based on patient selection in [facility].)     I evaluated the questionnaire submission on 12/20/2024 .    Ohs Peq Evisit General    12/20/2024 10:43 AM CST - Filed by Patient   Do you agree to participate in an E-Visit? Yes   If you have any of the following symptoms, please present to your local emergency room or call 911:  I acknowledge   Choose the state of your primary residence [place]   What is the main issue you would like addressed today? Kidney stone   Please describe your symptoms Started with left flank pain   Where is your problem located? Urinary   How severe are your symptoms? Mild   Have you had these symptoms before? Yes   How long have you been having these symptoms? For a few days   Please list any medications or treatments you have used for your condition and indicate if it was effective or not. Flomax   What makes this feel better? Not sure   What makes this feel worse? Laying down   Are these symptoms related to a condition that you currently have? No   Please describe any probable cause for these symptoms Vital proteins   Provide any additional information you feel is important. Calcium supplements causing kidney stones   Please attach any relevant images or files    Are you able to take your vital signs? Yes   Systolic Blood Pressure: 126   Diastolic Blood Pressure: 81   Weight: 153   Height: 66   Pulse: 82   Temperature:    Respiration rate: 18   Pulse Oxygen:          Encounter Diagnoses   Name Primary?    Nephrolithiasis Yes    Abdominal pain, unspecified abdominal location         Orders Placed This Encounter   Procedures    CT Renal Stone Study ABD Pelvis WO     Standing Status:   Future      Standing Expiration Date:   12/20/2025     Order Specific Question:   May the Radiologist modify the order per protocol to meet the clinical needs of the patient?     Answer:   Yes      Medications Ordered This Encounter   Medications    oxyCODONE-acetaminophen (PERCOCET) 7.5-325 mg per tablet     Sig: Take 0.5-1 tablets by mouth every 8 (eight) hours as needed for Pain.     Dispense:  21 tablet     Refill:  0     Quantity prescribed more than 7 day supply? No     Order Specific Question:   I have reviewed the Prescription Drug Monitoring Program (PDMP) database for this patient prior to prescribing the above opioid medication     Answer:   Yes        No follow-ups on file.      E-Visit Time Tracking:    Day 1 Time (in minutes): 5    Total Time (in minutes): 5

## 2024-12-20 NOTE — TELEPHONE ENCOUNTER
Refill Decision Note   Jacek Duran  is requesting a refill authorization.    Brief Assessment and Rationale for Refill:   Quick Discontinue       Medication Therapy Plan:   E-Prescribing Status: Receipt confirmed by pharmacy (12/20/2024 12:00 PM CST)      Comments:     Note composed:5:13 PM 12/20/2024

## 2024-12-23 ENCOUNTER — TELEPHONE (OUTPATIENT)
Dept: INTERNAL MEDICINE | Facility: CLINIC | Age: 64
End: 2024-12-23
Payer: COMMERCIAL

## 2024-12-23 DIAGNOSIS — R10.9 ABDOMINAL PAIN, UNSPECIFIED ABDOMINAL LOCATION: Primary | ICD-10-CM

## 2024-12-23 NOTE — TELEPHONE ENCOUNTER
Pt is wanting the CT renal stone study orders to be fax to [facility][note 3] Fax Number [phone]

## 2024-12-23 NOTE — TELEPHONE ENCOUNTER
----- Message from Mahendra sent at 12/23/2024  8:02 AM CST -----  Contact: [phone]  .1MEDICALADVICE     Patient is calling for Medical Advice regarding:pt is calling in regards to orders to be fax to [facility] Fax Number [phone]    How long has patient had these symptoms:    Pharmacy name and phone#:    Patient wants a call back or [facility]:call back    Comments:    Please advise patient replies from provider may take up to 48 hours.

## 2025-03-13 ENCOUNTER — PATIENT OUTREACH (OUTPATIENT)
Dept: ADMINISTRATIVE | Facility: HOSPITAL | Age: 65
End: 2025-03-13
Payer: COMMERCIAL

## 2025-03-24 ENCOUNTER — PATIENT MESSAGE (OUTPATIENT)
Dept: INTERNAL MEDICINE | Facility: CLINIC | Age: 65
End: 2025-03-24
Payer: COMMERCIAL

## (undated) DEVICE — GAUZE FLUFF XXLG 36X36 2 PLY

## (undated) DEVICE — SHEET EENT SPLIT

## (undated) DEVICE — SEE MEDLINE ITEM 146313

## (undated) DEVICE — SEE MEDLINE ITEM 146417

## (undated) DEVICE — GLOVE BIOGEL SKINSENSE PI 7.5

## (undated) DEVICE — SUT 3/0 18IN PDS II CLR MON

## (undated) DEVICE — STAPLER SKIN REGULAR

## (undated) DEVICE — SUT ETHILON 6-0 BLK PC-1

## (undated) DEVICE — SEE MEDLINE ITEM 152487

## (undated) DEVICE — BLADE SURG CARBON STEEL #10

## (undated) DEVICE — SUT MONOCRYL 5-0 P-3 UND 18

## (undated) DEVICE — BLADE SURG #15 CARBON STEEL

## (undated) DEVICE — SPONGE GAUZE 16PLY 4X4

## (undated) DEVICE — BANDAGE DERMACEA STRETCH 4X1IN

## (undated) DEVICE — TRAY ENT 4/CS

## (undated) DEVICE — SEE MEDLINE ITEM 146373

## (undated) DEVICE — SYR SLIP TIP 1CC

## (undated) DEVICE — ELECTRODE NEEDLE 2.8IN

## (undated) DEVICE — SEE MEDLINE ITEM 152622

## (undated) DEVICE — WARMER DRAPE STERILE LF

## (undated) DEVICE — ELECTRODE BLADE INSULATED 1 IN

## (undated) DEVICE — SEE MEDLINE ITEM 157117

## (undated) DEVICE — SEE MEDLINE ITEM 157194

## (undated) DEVICE — NDL HYPO A BEVEL 30X1/2

## (undated) DEVICE — CLOSURE SKIN STERI STRIP 1/8X3

## (undated) DEVICE — CORD BIPOLAR 12 FOOT

## (undated) DEVICE — DENTAL ROLL 1 1/2 X 3/8

## (undated) DEVICE — TRAY FOLEY 16FR INFECTION CONT

## (undated) DEVICE — SKINMARKER & RULER REGULAR X-F

## (undated) DEVICE — SUT 5-0 CHROMIC GUT / P-3

## (undated) DEVICE — SUT CHR GUT 6-0 G-1 18 IN

## (undated) DEVICE — COVER PROBE NL STRL 3.6X96IN

## (undated) DEVICE — SYR B-D DISP CONTROL 10CC100/C

## (undated) DEVICE — SPONGE DERMACEA 4X4IN 12PLY

## (undated) DEVICE — SPONGE DERMACEA GAUZE 4X4

## (undated) DEVICE — SUT 6/0 18IN NUROLON BLK BR

## (undated) DEVICE — NDL HYPO 27G X 1 1/2

## (undated) DEVICE — BLADE SURGICAL 15C

## (undated) DEVICE — ELECTRODE REM PLYHSV RETURN 9

## (undated) DEVICE — PENCIL ROCKER SWITCH 10FT CORD

## (undated) DEVICE — DRESSING N ADH OIL EMUL 3X3

## (undated) DEVICE — SUT SILK 6-0 BLK BR P-1 P-1

## (undated) DEVICE — SEE MEDLINE ITEM 157128

## (undated) DEVICE — CLOSURE SKIN STERI STRIP 1/2X4

## (undated) DEVICE — NDL SPINAL 25GX3.5 SPINOCAN

## (undated) DEVICE — CUP MEDICINE STERILE 2OZ

## (undated) DEVICE — SUT ETHILON 6-0 BLK P-1 BLK

## (undated) DEVICE — GLOVE BIOGEL ECLIPSE SZ 7.5

## (undated) DEVICE — SUT 5-0 PROLENE

## (undated) DEVICE — CLOSURE SKIN STERI STRIP 1/4X3

## (undated) DEVICE — SUT 3/0 30IN ETHIBOND EXTR

## (undated) DEVICE — GAUZE SPONGE 4X4 12PLY

## (undated) DEVICE — SUT PROLENE 7-0 P-6